# Patient Record
Sex: FEMALE | Race: WHITE | NOT HISPANIC OR LATINO | Employment: OTHER | URBAN - METROPOLITAN AREA
[De-identification: names, ages, dates, MRNs, and addresses within clinical notes are randomized per-mention and may not be internally consistent; named-entity substitution may affect disease eponyms.]

---

## 2017-02-02 ENCOUNTER — PATIENT MESSAGE (OUTPATIENT)
Dept: OBSTETRICS AND GYNECOLOGY | Facility: CLINIC | Age: 47
End: 2017-02-02

## 2017-02-02 DIAGNOSIS — Z01.419 ENCOUNTER FOR GYNECOLOGICAL EXAMINATION (GENERAL) (ROUTINE) WITHOUT ABNORMAL FINDINGS: ICD-10-CM

## 2017-02-03 RX ORDER — LEVONORGESTREL / ETHINYL ESTRADIOL AND ETHINYL ESTRADIOL 150-30(84)
1 KIT ORAL DAILY
Qty: 90 EACH | Refills: 3 | Status: SHIPPED | OUTPATIENT
Start: 2017-02-03 | End: 2017-09-01

## 2017-02-03 NOTE — TELEPHONE ENCOUNTER
Julia pt requesting a refill of her OCP. Saw Dr. Dumont for her annual last April. Pt says that she lost her insurance but that she is on disability and will be eligible for medicare in September. She said she an make an apt for her annual then. Pt states that she has pmdd and bipolar disorder and the OCP really help with both. Allergies and pharmacy up to date.

## 2017-09-01 ENCOUNTER — OFFICE VISIT (OUTPATIENT)
Dept: OBSTETRICS AND GYNECOLOGY | Facility: CLINIC | Age: 47
End: 2017-09-01
Payer: MEDICARE

## 2017-09-01 VITALS
WEIGHT: 185.19 LBS | DIASTOLIC BLOOD PRESSURE: 80 MMHG | HEIGHT: 60 IN | SYSTOLIC BLOOD PRESSURE: 118 MMHG | BODY MASS INDEX: 36.36 KG/M2

## 2017-09-01 DIAGNOSIS — Z01.419 ENCOUNTER FOR GYNECOLOGICAL EXAMINATION (GENERAL) (ROUTINE) WITHOUT ABNORMAL FINDINGS: Primary | ICD-10-CM

## 2017-09-01 PROCEDURE — 99213 OFFICE O/P EST LOW 20 MIN: CPT | Mod: PBBFAC,PN | Performed by: OBSTETRICS & GYNECOLOGY

## 2017-09-01 PROCEDURE — 99999 PR PBB SHADOW E&M-EST. PATIENT-LVL III: CPT | Mod: PBBFAC,,, | Performed by: OBSTETRICS & GYNECOLOGY

## 2017-09-01 PROCEDURE — G0101 CA SCREEN;PELVIC/BREAST EXAM: HCPCS | Mod: S$PBB,,, | Performed by: OBSTETRICS & GYNECOLOGY

## 2017-09-01 RX ORDER — ZOLPIDEM TARTRATE 10 MG/1
10 TABLET ORAL NIGHTLY PRN
COMMUNITY
End: 2019-09-17

## 2017-09-01 RX ORDER — MODAFINIL 200 MG/1
400 TABLET ORAL 2 TIMES DAILY
COMMUNITY
End: 2020-07-23

## 2017-09-01 RX ORDER — SERTRALINE HYDROCHLORIDE 50 MG/1
50 TABLET, FILM COATED ORAL
COMMUNITY
End: 2018-02-15

## 2017-09-01 RX ORDER — ARIPIPRAZOLE 15 MG/1
15 TABLET ORAL DAILY
COMMUNITY
End: 2018-08-14

## 2017-09-01 NOTE — PROGRESS NOTES
Subjective:       Patient ID: Rosa Lau is a 47 y.o. female.    Chief Complaint:  Well Woman (Annual Exam, last pap 2016 pap & hpv neg, last mammo  normal. C/o hair loss)      Patient Active Problem List   Diagnosis    Bipolar I disorder, most recent episode (or current) unspecified       History of Present Illness  47 y.o. yo  here for annual exam. Was taken off OCP by eye doctor for recent issues with her retina. Periods are now unpredictable and worse PMS. Says Bipolar has been bad recently and is now on disability because of it. Rec ask eye doctor if ok for Mirena. May help with PMS. DId well on OCP.    Patient had a normal pap smear and HPV in 2016 at last annual visit. I explained new guidelines. Will repeat pap and HPV every 3 years. Answered all questions. Patient agrees.     Past Medical History:   Diagnosis Date    Macular degeneration of both eyes     Mental disorder     Bipolar disorder       Past Surgical History:   Procedure Laterality Date    ankle plate surgery       OVARIAN CYST SURGERY         OB History    Para Term  AB Living   0 0 0 0 0 0   SAB TAB Ectopic Multiple Live Births   0 0 0 0               Patient's last menstrual period was 2017.   Date of Last Pap: No result found    Review of Systems  Review of Systems   Constitutional: Negative for fatigue and unexpected weight change.   Respiratory: Negative for shortness of breath.    Cardiovascular: Negative for chest pain.   Gastrointestinal: Negative for abdominal pain, constipation, diarrhea, nausea and vomiting.   Genitourinary: Negative for dysuria.   Musculoskeletal: Negative for back pain.   Skin: Negative for rash.   Neurological: Negative for headaches.   Hematological: Does not bruise/bleed easily.   Psychiatric/Behavioral: Negative for behavioral problems.        Objective:   Physical Exam:   Constitutional: She is oriented to person, place, and time. Vital signs are normal. She appears  well-developed and well-nourished. No distress.        Pulmonary/Chest: She exhibits no mass. Right breast exhibits no mass, no nipple discharge, no skin change, no tenderness, no bleeding and no swelling. Left breast exhibits no mass, no nipple discharge, no skin change, no tenderness, no bleeding and no swelling. Breasts are symmetrical.        Abdominal: Soft. Normal appearance and bowel sounds are normal. She exhibits no distension and no mass. There is no tenderness. There is no rebound.     Genitourinary: Vagina normal and uterus normal. There is no rash, tenderness, lesion or injury on the right labia. There is no rash, tenderness, lesion or injury on the left labia. Uterus is not deviated, not enlarged, not fixed, not tender, not hosting fibroids and not experiencing uterine prolapse. Cervix is normal. Right adnexum displays no mass, no tenderness and no fullness. Left adnexum displays no mass, no tenderness and no fullness. No erythema, tenderness, rectocele, cystocele or unspecified prolapse of vaginal walls in the vagina. No vaginal discharge found. Cervix exhibits no motion tenderness, no discharge and no friability.           Musculoskeletal: Normal range of motion and moves all extremeties.      Lymphadenopathy:     She has no axillary adenopathy.        Right: No supraclavicular adenopathy present.        Left: No supraclavicular adenopathy present.    Neurological: She is alert and oriented to person, place, and time.    Skin: Skin is warm and dry.    Psychiatric: She has a normal mood and affect. Her behavior is normal. Judgment normal.        Assessment/ Plan:     1. Encounter for gynecological examination (general) (routine) without abnormal findings         Follow-up with me in 1 year

## 2017-09-07 ENCOUNTER — PATIENT MESSAGE (OUTPATIENT)
Dept: OBSTETRICS AND GYNECOLOGY | Facility: CLINIC | Age: 47
End: 2017-09-07

## 2017-09-07 DIAGNOSIS — F32.81 PMDD (PREMENSTRUAL DYSPHORIC DISORDER): Primary | ICD-10-CM

## 2017-09-07 DIAGNOSIS — Z12.31 ENCOUNTER FOR SCREENING MAMMOGRAM FOR MALIGNANT NEOPLASM OF BREAST: Primary | ICD-10-CM

## 2017-09-07 RX ORDER — NORETHINDRONE ACETATE AND ETHINYL ESTRADIOL 1MG-20(21)
1 KIT ORAL DAILY
Qty: 112 TABLET | Refills: 3 | Status: SHIPPED | OUTPATIENT
Start: 2017-09-07 | End: 2017-09-25

## 2017-09-24 ENCOUNTER — PATIENT MESSAGE (OUTPATIENT)
Dept: OBSTETRICS AND GYNECOLOGY | Facility: CLINIC | Age: 47
End: 2017-09-24

## 2017-09-24 DIAGNOSIS — F32.81 PMDD (PREMENSTRUAL DYSPHORIC DISORDER): Primary | ICD-10-CM

## 2017-09-25 RX ORDER — LEVONORGESTREL AND ETHINYL ESTRADIOL 0.1-0.02MG
1 KIT ORAL DAILY
Qty: 112 TABLET | Refills: 3 | Status: SHIPPED | OUTPATIENT
Start: 2017-09-25 | End: 2018-09-13 | Stop reason: SDUPTHER

## 2017-09-25 NOTE — TELEPHONE ENCOUNTER
I called pt. Doing ok. On disability for bipolar. Home with her wife who is there to monitor her. Patient does not feel her life is in danger. She really wants to try another pill. I rec consider stopping OCP and not doing another pill. But she feels strongly that she wants to try. She did well on Seasonique continuously in the past but we were trying a lower dose because of eye issues. Seasonique was Levonorgestrel. Will try Alesse which it the same progestin. Explained to pt. rec call me or message on protal in 1 week to let me know how she is doing. If SI continue then d/c OCP. Pt agrees

## 2017-10-09 ENCOUNTER — PATIENT MESSAGE (OUTPATIENT)
Dept: OBSTETRICS AND GYNECOLOGY | Facility: CLINIC | Age: 47
End: 2017-10-09

## 2017-10-17 ENCOUNTER — TELEPHONE (OUTPATIENT)
Dept: NEUROLOGY | Facility: CLINIC | Age: 47
End: 2017-10-17

## 2018-02-15 ENCOUNTER — OFFICE VISIT (OUTPATIENT)
Dept: NEUROLOGY | Facility: CLINIC | Age: 48
End: 2018-02-15
Payer: MEDICARE

## 2018-02-15 VITALS
DIASTOLIC BLOOD PRESSURE: 80 MMHG | BODY MASS INDEX: 36.05 KG/M2 | SYSTOLIC BLOOD PRESSURE: 125 MMHG | WEIGHT: 183.63 LBS | HEART RATE: 80 BPM | HEIGHT: 60 IN

## 2018-02-15 DIAGNOSIS — E78.49 OTHER HYPERLIPIDEMIA: ICD-10-CM

## 2018-02-15 DIAGNOSIS — R41.3 MEMORY LOSS: Primary | ICD-10-CM

## 2018-02-15 DIAGNOSIS — F31.9 BIPOLAR AFFECTIVE DISORDER, REMISSION STATUS UNSPECIFIED: ICD-10-CM

## 2018-02-15 DIAGNOSIS — I10 ESSENTIAL HYPERTENSION: ICD-10-CM

## 2018-02-15 PROCEDURE — 99999 PR PBB SHADOW E&M-EST. PATIENT-LVL III: CPT | Mod: PBBFAC,,, | Performed by: PSYCHIATRY & NEUROLOGY

## 2018-02-15 PROCEDURE — 99204 OFFICE O/P NEW MOD 45 MIN: CPT | Mod: S$PBB,,, | Performed by: PSYCHIATRY & NEUROLOGY

## 2018-02-15 PROCEDURE — 99213 OFFICE O/P EST LOW 20 MIN: CPT | Mod: PBBFAC | Performed by: PSYCHIATRY & NEUROLOGY

## 2018-02-15 RX ORDER — BENZTROPINE MESYLATE 1 MG/1
3 TABLET ORAL DAILY
COMMUNITY
Start: 2018-02-01 | End: 2019-09-17

## 2018-02-15 RX ORDER — CLONAZEPAM 2 MG/1
2 TABLET ORAL DAILY
COMMUNITY
Start: 2018-02-02

## 2018-02-15 RX ORDER — HYDROCHLOROTHIAZIDE 12.5 MG/1
12.5 CAPSULE ORAL DAILY
COMMUNITY
Start: 2018-01-15 | End: 2019-09-17 | Stop reason: SDUPTHER

## 2018-02-15 NOTE — PATIENT INSTRUCTIONS
Discussed with patient and her partner.  Her intermittent memory difficulties could be multifactorial including the history of the bipolar disorder, or medication related.  She has a history of prior head trauma with loss of consciousness the details are not known.  We will get a CT scan of the brain, noncontrast and set her up for neuropsychological testing.  In addition we will get copies of recent labs done by her primary care physician, Dr. Kori Hollingsworth.  She will follow-up after the neuropsychological testing is completed.

## 2018-02-20 ENCOUNTER — HOSPITAL ENCOUNTER (OUTPATIENT)
Dept: RADIOLOGY | Facility: OTHER | Age: 48
Discharge: HOME | End: 2018-02-20
Attending: PSYCHIATRY & NEUROLOGY
Payer: MEDICARE

## 2018-02-20 DIAGNOSIS — E78.49 OTHER HYPERLIPIDEMIA: ICD-10-CM

## 2018-02-20 DIAGNOSIS — I10 ESSENTIAL HYPERTENSION: ICD-10-CM

## 2018-02-20 DIAGNOSIS — F31.9 BIPOLAR AFFECTIVE DISORDER, REMISSION STATUS UNSPECIFIED: ICD-10-CM

## 2018-02-20 DIAGNOSIS — R41.3 MEMORY LOSS: ICD-10-CM

## 2018-02-20 PROCEDURE — 70450 CT HEAD/BRAIN W/O DYE: CPT | Mod: TC

## 2018-02-20 PROCEDURE — 70450 CT HEAD/BRAIN W/O DYE: CPT | Mod: 26,,, | Performed by: RADIOLOGY

## 2018-04-30 ENCOUNTER — INITIAL CONSULT (OUTPATIENT)
Dept: NEUROLOGY | Facility: CLINIC | Age: 48
End: 2018-04-30
Payer: MEDICARE

## 2018-04-30 DIAGNOSIS — F31.9 BIPOLAR AFFECTIVE DISORDER, REMISSION STATUS UNSPECIFIED: ICD-10-CM

## 2018-04-30 DIAGNOSIS — I10 ESSENTIAL HYPERTENSION: ICD-10-CM

## 2018-04-30 DIAGNOSIS — R41.3 MEMORY LOSS: ICD-10-CM

## 2018-04-30 DIAGNOSIS — F31.5: ICD-10-CM

## 2018-04-30 DIAGNOSIS — R41.9 COGNITIVE COMPLAINTS: ICD-10-CM

## 2018-04-30 DIAGNOSIS — E78.49 OTHER HYPERLIPIDEMIA: ICD-10-CM

## 2018-04-30 PROCEDURE — 96119 PR NEUROPSYCH TESTING BY TECHNICIAN: CPT | Mod: 59,S$PBB,, | Performed by: PSYCHIATRY & NEUROLOGY

## 2018-04-30 PROCEDURE — 90791 PSYCH DIAGNOSTIC EVALUATION: CPT | Mod: S$PBB,,, | Performed by: PSYCHIATRY & NEUROLOGY

## 2018-04-30 PROCEDURE — 90791 PSYCH DIAGNOSTIC EVALUATION: CPT | Mod: PBBFAC | Performed by: PSYCHIATRY & NEUROLOGY

## 2018-04-30 PROCEDURE — 96118 PR NEUROPSYCH TESTING BY PSYCH/PHYS: CPT | Mod: S$PBB,,, | Performed by: PSYCHIATRY & NEUROLOGY

## 2018-04-30 PROCEDURE — 96119 PR NEUROPSYCH TESTING BY TECHNICIAN: CPT | Mod: PBBFAC | Performed by: PSYCHIATRY & NEUROLOGY

## 2018-04-30 PROCEDURE — 99499 UNLISTED E&M SERVICE: CPT | Mod: S$PBB,,, | Performed by: PSYCHIATRY & NEUROLOGY

## 2018-04-30 PROCEDURE — 96118 PR NEUROPSYCH TESTING BY PSYCH/PHYS: CPT | Mod: PBBFAC | Performed by: PSYCHIATRY & NEUROLOGY

## 2018-04-30 NOTE — Clinical Note
Hi,  Here are the results of Ms. Lau's evaluation. Please let me know if you have any questions.  [place], Aline

## 2018-04-30 NOTE — PROGRESS NOTES
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 less noise) to promote sleep readiness.   vi. Visit the www.sleepfoundation.org for more strategies.   f. Practice good cognitive hygiene:  i. Engage in regular exercise, which increases alertness and arousal and can improve attention and focus.  Consider lower impact exercises, such as yoga or light walking.  ii. Get a good nights sleep, as this can enhance alertness and cognition.  iii. Eat healthy foods and balanced meals. It is notable that research indicates certain nutrients may aid in brain function, such as B vitamins (especially B6, B12, and folic acid), antioxidants (such as vitamins C and E, and beta carotene), and Omega-3 fatty acids. Talk with your physician or nutritionist about whats right for you.   iv. Keep your brain active. Find activities to stay mentally active, such as reading, games (cards, checkers), puzzles (crosswords, Sudoku, jig saw), crafts (Ravel Law, woodworking), gardening, or participating in activities in the community.  v. Stay socially engaged. Continue staying active with your family and friends.  g. Monitor your mood:  You reported symptoms of anxiety and depression in the context of longstanding bipolar disorder, and you described some improvement in symptoms over  the past year. I recommend continuing psychiatric and psychological treatment to help you more effectively manage symptoms.    Thank you for allowing me to participate in Ms. Lau's care.  If you have any questions, please contact me at [phone].    Aline Roper, Ph.D., ABPP  Board Certified in Clinical Neuropsychology  [facility] - Department of Neurology    APPENDIX    Neuropsychological ASSESSMENT Results    The following should not be interpreted in isolation from the neuropsychological evaluation report.  Scores on stand-alone and embedded performance validity measures were variable.    Percentile Interpretation:       </=2nd......................................Impaired       3rd-8th.....................................Borderline       9th-24th...................................Low Average       25th-75th...................................Average       76th-91st...................................High Average       92nd-97th...................................Superior       >97th.....................................Very Superior     Test, Subtests, Indices, Composites Raw Score Standardized Score (Standard Score, Index, Scaled Score, Z-score, T-score) Percentile/Cumulative Percentage   Premorbid Estimate   TOP 52 109 Predicted FSIQ = 108   Intellectual Functioning   WAIS-IV      Similarities 30 12 75   Block Design 24 6 9   Matrix Reasoning 16 10 50   Digit Span 21 7 16   Symbol Search 22 7 16   Coding 52 8 25   Attention/Processing Speed/Executive Functioning   WAIS-IV Digit Span      Forward Raw 7 6 9   Forward Span 5 - -   Backward Raw 8 9 37   Backward Span 4 - -   Sequencing Raw 6 7 16   Sequencing Span 4 - -         TMT      Trails A 24, 0 errors 52 55   Trails B 58, 0 errors 48 45         COWAT      FAS 38 45 32   F 13 - -   A 10 - -   S 15 - -   Animals 17 39 14         WCST-64      Categories Completed 5 - >16   Trials to Complete 1st 11 - >16   Fail to Maintain Set 0 - -   Learning to  Learn 0 - >16   Total Errors 9 51 55   Perseverative Errors 5 47 39   Language Functioning   NAB      Naming 31/31 54 66   Motor Functioning   Grooved Pegboard      Dominant Hand (Right) 86, 0 drops 30 2   Non-dominant Hand 93, 0 drops 32 4   Memory Functioning   WMS-IV, Adult      Logical Memory I 18 7 16   LM II 16 8 25   LM Recog 20 - 3-9   Vis Reprod I 32 8 25   VR II 27 10 50   VR Recog 4 - 17-25   VR Copy 42 - 26-50         HVLT-R, Form 1      Total Recall 25 42 21   Trial 1 6 - -   Trial 2 8 - -   Trial 3 11 - -   Delayed Recall 8 39 14   Retention (%) 73 37 9   Recog Discrim Index 11 49 47   Hits 11/12 - -   False Positives 0/12 - -   Emotional Functioning   LATASHA 19/63 - Moderate         BDI-II 21/63 - Moderate

## 2018-05-09 ENCOUNTER — DOCUMENTATION ONLY (OUTPATIENT)
Dept: NEUROLOGY | Facility: CLINIC | Age: 48
End: 2018-05-09

## 2018-05-09 NOTE — PROGRESS NOTES
NEUROPSYCHOLOGICAL EVALUATION FEEDBACK    Rosa Lau attended a feedback session today by telephone, accompanied by her spouse.  We discussed the results of the neuropsychological evaluation (39 minutes).  I provided Ms. Lau with a copy of the evaluation report via mail and gave time to discuss questions and concerns.    Aline Roper, Ph.D., ABPP  Board Certified in Clinical Neuropsychology  [facility] - Department of Neurology

## 2018-06-12 ENCOUNTER — HOSPITAL ENCOUNTER (OUTPATIENT)
Dept: RADIOLOGY | Facility: OTHER | Age: 48
Discharge: HOME | End: 2018-06-12
Attending: OBSTETRICS & GYNECOLOGY
Payer: MEDICARE

## 2018-06-12 ENCOUNTER — OFFICE VISIT (OUTPATIENT)
Dept: NEUROLOGY | Facility: CLINIC | Age: 48
End: 2018-06-12
Payer: MEDICARE

## 2018-06-12 VITALS
SYSTOLIC BLOOD PRESSURE: 109 MMHG | DIASTOLIC BLOOD PRESSURE: 71 MMHG | WEIGHT: 180.31 LBS | BODY MASS INDEX: 35.4 KG/M2 | HEART RATE: 91 BPM | HEIGHT: 60 IN

## 2018-06-12 DIAGNOSIS — F31.9 COGNITIVE DYSFUNCTION IN BIPOLAR DISORDER: Primary | ICD-10-CM

## 2018-06-12 DIAGNOSIS — F06.8 COGNITIVE DYSFUNCTION IN BIPOLAR DISORDER: Primary | ICD-10-CM

## 2018-06-12 DIAGNOSIS — F41.8 MIXED ANXIETY AND DEPRESSIVE DISORDER: ICD-10-CM

## 2018-06-12 DIAGNOSIS — F31.9 BIPOLAR AFFECTIVE DISORDER, REMISSION STATUS UNSPECIFIED: ICD-10-CM

## 2018-06-12 DIAGNOSIS — E03.9 HYPOTHYROIDISM, UNSPECIFIED TYPE: ICD-10-CM

## 2018-06-12 DIAGNOSIS — I10 ESSENTIAL HYPERTENSION: ICD-10-CM

## 2018-06-12 DIAGNOSIS — E78.49 OTHER HYPERLIPIDEMIA: ICD-10-CM

## 2018-06-12 DIAGNOSIS — Z12.31 ENCOUNTER FOR SCREENING MAMMOGRAM FOR MALIGNANT NEOPLASM OF BREAST: ICD-10-CM

## 2018-06-12 PROBLEM — J45.909 EXTRINSIC ASTHMA: Status: ACTIVE | Noted: 2018-06-12

## 2018-06-12 PROBLEM — M72.2 PLANTAR FASCIITIS: Status: ACTIVE | Noted: 2017-12-18

## 2018-06-12 PROBLEM — E78.1 PURE HYPERTRIGLYCERIDEMIA: Status: ACTIVE | Noted: 2018-06-12

## 2018-06-12 PROBLEM — J45.30 MILD PERSISTENT ASTHMA, UNCOMPLICATED: Status: ACTIVE | Noted: 2018-06-12

## 2018-06-12 PROBLEM — K21.9 GASTRO-ESOPHAGEAL REFLUX DISEASE WITHOUT ESOPHAGITIS: Status: ACTIVE | Noted: 2018-06-12

## 2018-06-12 PROBLEM — J30.1 ALLERGIC RHINITIS DUE TO POLLEN: Status: ACTIVE | Noted: 2018-06-12

## 2018-06-12 PROBLEM — J45.909 ASTHMA: Status: ACTIVE | Noted: 2018-06-12

## 2018-06-12 PROBLEM — H35.30 MYOPIC MACULAR DEGENERATION: Status: ACTIVE | Noted: 2017-06-19

## 2018-06-12 PROBLEM — K58.9 IRRITABLE COLON: Status: ACTIVE | Noted: 2018-06-12

## 2018-06-12 PROCEDURE — 77067 SCR MAMMO BI INCL CAD: CPT | Mod: 26,,, | Performed by: RADIOLOGY

## 2018-06-12 PROCEDURE — 99999 PR PBB SHADOW E&M-EST. PATIENT-LVL III: CPT | Mod: PBBFAC,,, | Performed by: PSYCHIATRY & NEUROLOGY

## 2018-06-12 PROCEDURE — 77063 BREAST TOMOSYNTHESIS BI: CPT | Mod: 26,,, | Performed by: RADIOLOGY

## 2018-06-12 PROCEDURE — 99214 OFFICE O/P EST MOD 30 MIN: CPT | Mod: S$PBB,,, | Performed by: PSYCHIATRY & NEUROLOGY

## 2018-06-12 PROCEDURE — 77067 SCR MAMMO BI INCL CAD: CPT | Mod: TC

## 2018-06-12 PROCEDURE — 99213 OFFICE O/P EST LOW 20 MIN: CPT | Mod: PBBFAC | Performed by: PSYCHIATRY & NEUROLOGY

## 2018-06-12 RX ORDER — ALBUTEROL SULFATE 90 UG/1
AEROSOL, METERED RESPIRATORY (INHALATION)
COMMUNITY
Start: 2018-04-18 | End: 2020-07-23 | Stop reason: SDUPTHER

## 2018-06-12 RX ORDER — BUDESONIDE AND FORMOTEROL FUMARATE DIHYDRATE 80; 4.5 UG/1; UG/1
AEROSOL RESPIRATORY (INHALATION)
COMMUNITY
End: 2022-01-14

## 2018-06-12 RX ORDER — BUDESONIDE 180 UG/1
AEROSOL, POWDER RESPIRATORY (INHALATION)
COMMUNITY
Start: 2018-05-23 | End: 2019-09-17
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
## 2018-06-12 NOTE — PATIENT INSTRUCTIONS
Discussed with patient and her partner.  Her intermittent memory difficulties  Is most likely related to the history of the bipolar disorder, or medication related.   Results of the neuropsychological testing discussed with the patient.  She is presently being followed by Psychiatry who is in the process of weaning her off some of medications to see if this might help her cognitive issues.  Control vascular risk factors specially hypertension and hyperlipidemia is important.  She is advised to continue follow-up with Psychiatry and will be seen by me on an as needed basis.

## 2018-08-14 ENCOUNTER — OFFICE VISIT (OUTPATIENT)
Dept: DERMATOLOGY | Facility: CLINIC | Age: 48
End: 2018-08-14
Payer: MEDICARE

## 2018-08-14 DIAGNOSIS — B35.1 ONYCHOMYCOSIS: Primary | ICD-10-CM

## 2018-08-14 PROCEDURE — 99212 OFFICE O/P EST SF 10 MIN: CPT | Mod: PBBFAC | Performed by: NURSE PRACTITIONER

## 2018-08-14 PROCEDURE — 87107 FUNGI IDENTIFICATION MOLD: CPT

## 2018-08-14 PROCEDURE — 99202 OFFICE O/P NEW SF 15 MIN: CPT | Mod: S$PBB,,, | Performed by: NURSE PRACTITIONER

## 2018-08-14 PROCEDURE — 99999 PR PBB SHADOW E&M-EST. PATIENT-LVL II: CPT | Mod: PBBFAC,,, | Performed by: NURSE PRACTITIONER

## 2018-08-14 PROCEDURE — 87101 SKIN FUNGI CULTURE: CPT

## 2018-08-14 RX ORDER — CICLOPIROX 80 MG/ML
SOLUTION TOPICAL
Qty: 1 BOTTLE | Refills: 5 | Status: SHIPPED | OUTPATIENT
Start: 2018-08-14 | End: 2019-09-17

## 2018-08-14 NOTE — PATIENT INSTRUCTIONS
Lamisil spray to shoes daily.  Zeasorb AF powder to socks daily    Recommend trimming nails.Keep your feet clean and dry. Avoid sharing nail tools, such as clippers and scissors. Wear flip-flops or other footwear in a gym shower or locker room

## 2018-08-14 NOTE — PROGRESS NOTES
Subjective:       Patient ID:  Rosa Lau is a 48 y.o. female who presents for   Chief Complaint   Patient presents with    Nail Problem     Nail Problem  - Initial  Affected locations: right foot  Duration: 1 year  Signs / symptoms: scaling and growing  Severity: mild  Timing: constant  Aggravated by: nothing  Relieving factors/Treatments tried: nothing  Improvement on treatment: no relief        Review of Systems   Skin: Positive for activity-related sunscreen use. Negative for daily sunscreen use.   Hematologic/Lymphatic: Does not bruise/bleed easily.        Objective:    Physical Exam   Constitutional: She appears well-developed and well-nourished. No distress.   Neurological: She is alert and oriented to person, place, and time. She is not disoriented.   Psychiatric: She has a normal mood and affect.   Skin:   Areas Examined (abnormalities noted in diagram):   Nails and Digits Inspection Performed                 Diagram Legend     Erythematous scaling macule/papule c/w actinic keratosis       Vascular papule c/w angioma      Pigmented verrucoid papule/plaque c/w seborrheic keratosis      Yellow umbilicated papule c/w sebaceous hyperplasia      Irregularly shaped tan macule c/w lentigo     1-2 mm smooth white papules consistent with Milia      Movable subcutaneous cyst with punctum c/w epidermal inclusion cyst      Subcutaneous movable cyst c/w pilar cyst      Firm pink to brown papule c/w dermatofibroma      Pedunculated fleshy papule(s) c/w skin tag(s)      Evenly pigmented macule c/w junctional nevus     Mildly variegated pigmented, slightly irregular-bordered macule c/w mildly atypical nevus      Flesh colored to evenly pigmented papule c/w intradermal nevus       Pink pearly papule/plaque c/w basal cell carcinoma      Erythematous hyperkeratotic cursted plaque c/w SCC      Surgical scar with no sign of skin cancer recurrence      Open and closed comedones      Inflammatory papules and pustules       Verrucoid papule consistent consistent with wart     Erythematous eczematous patches and plaques     Dystrophic onycholytic nail with subungual debris c/w onychomycosis     Umbilicated papule    Erythematous-base heme-crusted tan verrucoid plaque consistent with inflamed seborrheic keratosis     Erythematous Silvery Scaling Plaque c/w Psoriasis     See annotation      Assessment / Plan:        Onychomycosis  -     Fungal culture , skin, hair, or nails  -     ciclopirox (PENLAC) 8 % Soln; Apply daily to affected nail. Must remove and restart weekly  Dispense: 1 Bottle; Refill: 5    Lamisil spray to shoes daily.  Zeasorb AF powder to socks daily    Recommend trimming nails.Keep your feet clean and dry. Avoid sharing nail tools, such as clippers and scissors. Wear flip-flops or other footwear in a gym shower or locker room             Follow-up if symptoms worsen or fail to improve.

## 2018-08-21 ENCOUNTER — PATIENT MESSAGE (OUTPATIENT)
Dept: DERMATOLOGY | Facility: CLINIC | Age: 48
End: 2018-08-21

## 2018-09-13 ENCOUNTER — OFFICE VISIT (OUTPATIENT)
Dept: OBSTETRICS AND GYNECOLOGY | Facility: CLINIC | Age: 48
End: 2018-09-13
Attending: OBSTETRICS & GYNECOLOGY
Payer: MEDICARE

## 2018-09-13 VITALS
DIASTOLIC BLOOD PRESSURE: 86 MMHG | BODY MASS INDEX: 35.6 KG/M2 | HEIGHT: 60 IN | WEIGHT: 181.31 LBS | SYSTOLIC BLOOD PRESSURE: 126 MMHG

## 2018-09-13 DIAGNOSIS — F32.81 PMDD (PREMENSTRUAL DYSPHORIC DISORDER): ICD-10-CM

## 2018-09-13 PROCEDURE — G0101 CA SCREEN;PELVIC/BREAST EXAM: HCPCS | Mod: S$PBB,,, | Performed by: OBSTETRICS & GYNECOLOGY

## 2018-09-13 PROCEDURE — 99213 OFFICE O/P EST LOW 20 MIN: CPT | Mod: PBBFAC | Performed by: OBSTETRICS & GYNECOLOGY

## 2018-09-13 PROCEDURE — 99999 PR PBB SHADOW E&M-EST. PATIENT-LVL III: CPT | Mod: PBBFAC,,, | Performed by: OBSTETRICS & GYNECOLOGY

## 2018-09-13 RX ORDER — LEVONORGESTREL AND ETHINYL ESTRADIOL 0.1-0.02MG
1 KIT ORAL DAILY
Qty: 112 TABLET | Refills: 3 | Status: SHIPPED | OUTPATIENT
Start: 2018-09-13 | End: 2019-09-17

## 2018-09-13 RX ORDER — AMOXICILLIN 875 MG/1
TABLET, FILM COATED ORAL
COMMUNITY
Start: 2018-06-29 | End: 2019-09-17

## 2018-09-13 NOTE — PROGRESS NOTES
Subjective:       Patient ID: Rosa Lau is a 48 y.o. female.    Chief Complaint:  Annual Exam      Patient Active Problem List   Diagnosis    Bipolar affective disorder    Memory loss    Other hyperlipidemia    Essential hypertension    Asthma    Extrinsic asthma    Body mass index (bmi) 33.0-33.9, adult    Neck sprain and strain    Pure hypertriglyceridemia    Gastro-esophageal reflux disease without esophagitis    History of colonic polyps    Hypothyroidism    Irritable colon    Mild persistent asthma, uncomplicated    Mixed anxiety and depressive disorder    Myopic macular degeneration    Plantar fasciitis    Allergic rhinitis due to pollen    Cognitive dysfunction in bipolar disorder       History of Present Illness  48 y.o. yo  here for annual exam. No gyn complaints. Doing well. On lower dose pill because of eye retina issues. Says it is helping wit depression. She tried getting off OCP because of eye issues and had sever bout of depression and suicidal thoughts. Once she restarted those thoughts improved. Has occasional BTB now that she is on lower dose. Overall doing well. Needs colonoscopy. Mom has h/o cancerous polyps. Referral names given.     Patient had a normal pap smear and HPV in 2016 at last annual visit. I explained new guidelines. Will repeat pap and HPV every 3 years. Answered all questions. Patient agrees.         Past Medical History:   Diagnosis Date    Macular degeneration of both eyes     Mental disorder     Bipolar disorder       Past Surgical History:   Procedure Laterality Date    ankle plate surgery       OVARIAN CYST SURGERY         OB History    Para Term  AB Living   0 0 0 0 0 0   SAB TAB Ectopic Multiple Live Births   0 0 0 0               Patient's last menstrual period was 2018 (exact date).   Date of Last Pap: No result found    Review of Systems  Review of Systems   Constitutional: Negative for fatigue and unexpected  weight change.   Respiratory: Negative for shortness of breath.    Cardiovascular: Negative for chest pain.   Gastrointestinal: Negative for abdominal pain, constipation, diarrhea, nausea and vomiting.   Genitourinary: Negative for dysuria.   Musculoskeletal: Negative for back pain.   Skin: Negative for rash.   Neurological: Negative for headaches.   Hematological: Does not bruise/bleed easily.   Psychiatric/Behavioral: Negative for behavioral problems.        Objective:   Physical Exam:   Constitutional: She is oriented to person, place, and time. Vital signs are normal. She appears well-developed and well-nourished. No distress.        Pulmonary/Chest: She exhibits no mass. Right breast exhibits no mass, no nipple discharge, no skin change, no tenderness, no bleeding and no swelling. Left breast exhibits no mass, no nipple discharge, no skin change, no tenderness, no bleeding and no swelling. Breasts are symmetrical.        Abdominal: Soft. Normal appearance and bowel sounds are normal. She exhibits no distension and no mass. There is no tenderness. There is no rebound.     Genitourinary: Vagina normal and uterus normal. There is no rash, tenderness, lesion or injury on the right labia. There is no rash, tenderness, lesion or injury on the left labia. Uterus is not deviated, not enlarged, not fixed, not tender, not hosting fibroids and not experiencing uterine prolapse. Cervix is normal. Right adnexum displays no mass, no tenderness and no fullness. Left adnexum displays no mass, no tenderness and no fullness. No erythema, tenderness, rectocele, cystocele or unspecified prolapse of vaginal walls in the vagina. No vaginal discharge found. Cervix exhibits no motion tenderness, no discharge and no friability.           Musculoskeletal: Normal range of motion and moves all extremeties.      Lymphadenopathy:     She has no axillary adenopathy.        Right: No supraclavicular adenopathy present.        Left: No  supraclavicular adenopathy present.    Neurological: She is alert and oriented to person, place, and time.    Skin: Skin is warm and dry.    Psychiatric: She has a normal mood and affect. Her behavior is normal. Judgment normal.        Assessment/ Plan:     1. PMDD (premenstrual dysphoric disorder)  levonorgestrel-ethinyl estradiol (AVIANE,ALESSE,LESSINA) 0.1-20 mg-mcg per tablet       Follow-up with me in 1 year

## 2018-09-18 LAB — FUNGUS BLD CULT: NORMAL

## 2019-03-26 ENCOUNTER — TELEPHONE (OUTPATIENT)
Dept: OBSTETRICS AND GYNECOLOGY | Facility: CLINIC | Age: 49
End: 2019-03-26

## 2019-03-26 DIAGNOSIS — R10.2 PELVIC PAIN IN FEMALE: Primary | ICD-10-CM

## 2019-03-26 NOTE — TELEPHONE ENCOUNTER
Pt reports sharp pain in LLQ.  She has a h/o ruptured ovarian cysts and the pain is similar.  She has not been on OCP for 5 months.  Scheduled US and appt with Dr. Dumont tomorrow.  Aware of US prep, time and location.

## 2019-03-27 ENCOUNTER — TELEPHONE (OUTPATIENT)
Dept: OBSTETRICS AND GYNECOLOGY | Facility: CLINIC | Age: 49
End: 2019-03-27

## 2019-03-27 NOTE — TELEPHONE ENCOUNTER
Dr Dumont pt was seen this morning for a gyn u/s and still wants dr dumont to call her to discuss results.Pt # [phone]

## 2019-03-27 NOTE — TELEPHONE ENCOUNTER
I called pt. She had appt with u/s today for LLQ pain. U/s normal except for follicles, small cyst. I had to leave for emergency so I was not able to see her. Sounds like pain maybe diverticulitis. She says was pretty bad and is still hurting. I rec call PCP or go to ER if pain is severe. Pt agrees

## 2019-07-05 ENCOUNTER — OFFICE VISIT (OUTPATIENT)
Dept: URGENT CARE | Facility: CLINIC | Age: 49
End: 2019-07-05
Payer: MEDICARE

## 2019-07-05 VITALS
DIASTOLIC BLOOD PRESSURE: 66 MMHG | OXYGEN SATURATION: 95 % | SYSTOLIC BLOOD PRESSURE: 125 MMHG | HEART RATE: 80 BPM | HEIGHT: 59 IN | TEMPERATURE: 98 F | RESPIRATION RATE: 18 BRPM | BODY MASS INDEX: 38.51 KG/M2 | WEIGHT: 191 LBS

## 2019-07-05 DIAGNOSIS — H10.32 ACUTE CONJUNCTIVITIS OF LEFT EYE, UNSPECIFIED ACUTE CONJUNCTIVITIS TYPE: Primary | ICD-10-CM

## 2019-07-05 PROCEDURE — 99214 OFFICE O/P EST MOD 30 MIN: CPT | Mod: S$GLB,ICN,, | Performed by: NURSE PRACTITIONER

## 2019-07-05 PROCEDURE — 99214 PR OFFICE/OUTPT VISIT, EST, LEVL IV, 30-39 MIN: ICD-10-PCS | Mod: S$GLB,ICN,, | Performed by: NURSE PRACTITIONER

## 2019-07-05 RX ORDER — MOXIFLOXACIN 5 MG/ML
1 SOLUTION OPHTHALMIC 2 TIMES DAILY
Qty: 3 ML | Refills: 0 | Status: SHIPPED | OUTPATIENT
Start: 2019-07-05 | End: 2019-07-12

## 2019-07-05 RX ORDER — AZITHROMYCIN 250 MG/1
500 TABLET, FILM COATED ORAL DAILY
COMMUNITY
End: 2019-09-17

## 2019-07-05 NOTE — PATIENT INSTRUCTIONS
Please return here or go to the Emergency Department for any concerns or worsening of condition.  If you were prescribed antibiotics, please take them to completion.  If you were prescribed a narcotic medication, do not drive or operate heavy equipment or machinery while taking these medications.  Please follow up with your primary care doctor or specialist as needed.    If you  smoke, please stop smoking.    Conjunctivitis, Nonspecific    The membrane that covers the white part of your eye (the conjunctiva) is inflamed. Inflammation happens when your body responds to an injury, allergic reaction, infection, or illness. Symptoms of inflammation in the eye may include redness, irritation, itching, swelling, or burning. These symptoms should go away within the next 24 hours. Conjunctivitis may be related to a particle that was in your eye. If so, it may wash out with your tears or irrigation treatment. Being exposed to liquid chemicals or fumes may also cause this reaction.   Home care  · Apply a cold pack (ice in a plastic bag, wrapped in a towel) over the eye for 20 minutes at a time. This will reduce pain.  · Artificial tears may be prescribed to reduce irritation or redness.  These should be used 3 to 4 times a day.  · You may use acetaminophen or ibuprofen to control pain, unless another medicine was prescribed.(Note: If you have chronic liver or kidney disease, or if you have ever had a stomach ulcer or gastrointestinal bleeding, talk with your healthcare provider before using these medicines.)  Follow-up care  Follow up with your healthcare provider, or as advised.  When to seek medical advice  Call your healthcare provider right away if any of these occur:  · Increased eyelid swelling  · Increased eye pain  · Increased redness or drainage from the eye  · Increased blurry vision or increased sensitivity to light  · Failure of normal vision to return within 24 to 48 hours  Date Last Reviewed: 6/14/2015  ©  6092-6208 [employer]. [address] [place] [address], [place] [address]. All rights reserved. This information is not intended as a substitute for professional medical care. Always follow your healthcare professional's instructions.

## 2019-07-05 NOTE — PROGRESS NOTES
"Subjective:       Patient ID: Rosa Lau is a 49 y.o. female.    Vitals:    07/05/19 0818   BP: 125/66   Pulse: 80   Resp: 18   Temp: 97.5 °F (36.4 °C)   SpO2: 95%   Weight: 86.6 kg (191 lb)   Height: 4' 11" (1.499 m)       Chief Complaint: Conjunctivitis    Patient reports experiencing redness and discharge of the left eye for the past 3 days. Patient reports burning and blurriness of the eye. Has not used anything for relief.    Conjunctivitis   This is a new problem. The current episode started in the past 7 days. The problem occurs constantly. The problem has been unchanged. Nothing aggravates the symptoms. She has tried nothing for the symptoms. The treatment provided no relief.     Review of Systems   Eyes: Positive for blurred vision, discharge and redness. Negative for pain.   All other systems reviewed and are negative.      Objective:      Physical Exam   Constitutional: She is oriented to person, place, and time. Vital signs are normal. She appears well-developed and well-nourished.  Non-toxic appearance. She does not have a sickly appearance. She appears ill. No distress.   HENT:   Head: Normocephalic and atraumatic.   Right Ear: External ear normal.   Left Ear: External ear normal.   Nose: Nose normal.   Mouth/Throat: Uvula is midline and oropharynx is clear and moist.   Eyes: Pupils are equal, round, and reactive to light. EOM and lids are normal. Left eye exhibits discharge. Right conjunctiva is not injected. Left conjunctiva is injected.   Neck: Trachea normal, normal range of motion, full passive range of motion without pain and phonation normal. Neck supple.   Cardiovascular: Normal rate.   Pulmonary/Chest: Effort normal.   Abdominal: Soft. Bowel sounds are normal. There is no tenderness.   Musculoskeletal: Normal range of motion.   Lymphadenopathy:     She has no cervical adenopathy.   Neurological: She is alert and oriented to person, place, and time.   Skin: Skin is warm, dry and intact. " Capillary refill takes less than 2 seconds.   Psychiatric: She has a normal mood and affect. Her speech is normal and behavior is normal. Judgment and thought content normal. Cognition and memory are normal.   Nursing note and vitals reviewed.      Assessment:       1. Acute conjunctivitis of left eye, unspecified acute conjunctivitis type        Plan:       Rosa was seen today for conjunctivitis.    Diagnoses and all orders for this visit:    Acute conjunctivitis of left eye, unspecified acute conjunctivitis type  -     moxifloxacin 0.5 % DrpV; Apply 1 drop to eye 2 (two) times daily. for 7 days          Patient Instructions     Please return here or go to the Emergency Department for any concerns or worsening of condition.  If you were prescribed antibiotics, please take them to completion.  If you were prescribed a narcotic medication, do not drive or operate heavy equipment or machinery while taking these medications.  Please follow up with your primary care doctor or specialist as needed.    If you  smoke, please stop smoking.    Conjunctivitis, Nonspecific    The membrane that covers the white part of your eye (the conjunctiva) is inflamed. Inflammation happens when your body responds to an injury, allergic reaction, infection, or illness. Symptoms of inflammation in the eye may include redness, irritation, itching, swelling, or burning. These symptoms should go away within the next 24 hours. Conjunctivitis may be related to a particle that was in your eye. If so, it may wash out with your tears or irrigation treatment. Being exposed to liquid chemicals or fumes may also cause this reaction.   Home care  · Apply a cold pack (ice in a plastic bag, wrapped in a towel) over the eye for 20 minutes at a time. This will reduce pain.  · Artificial tears may be prescribed to reduce irritation or redness.  These should be used 3 to 4 times a day.  · You may use acetaminophen or ibuprofen to control pain, unless  another medicine was prescribed.(Note: If you have chronic liver or kidney disease, or if you have ever had a stomach ulcer or gastrointestinal bleeding, talk with your healthcare provider before using these medicines.)  Follow-up care  Follow up with your healthcare provider, or as advised.  When to seek medical advice  Call your healthcare provider right away if any of these occur:  · Increased eyelid swelling  · Increased eye pain  · Increased redness or drainage from the eye  · Increased blurry vision or increased sensitivity to light  · Failure of normal vision to return within 24 to 48 hours  Date Last Reviewed: 6/14/2015  © 3863-1273 [employer]. [address] [place] [address], [place] [address]. All rights reserved. This information is not intended as a substitute for professional medical care. Always follow your healthcare professional's instructions.

## 2019-09-16 NOTE — PROGRESS NOTES
"Subjective:       Patient ID: Rosa Lau is a 49 y.o. female.    Chief Complaint: Establish Care    HPI  This patient is new to me.   Rosa Lau is a 49 y.o. year old female with bipolar disorder, PMDD, LIA, GERD, asthma, HTN, HLD, MCI, eye disorder who presents today to establish care.     Complains of chest tightness that occurs occasionally. Does not occur with exertion, but she has been sedentary. Episodes last for 0.5-1hr. Also has some pain associated. Located in center of chest. No associated symptoms. Does take Rolaids daily for heartburn. Has tried PPI in the past.     Complains of neck and low back "cracking". Began around 1 year ago. Began out of the blue. No pain, but a cracking sensation with certain movements of her neck.     Eye disorder - has been diagnosed with Histomplasmosis capsulatum retinitis, Chorioretinitis  Retinoschisis, Myopic degeneration - follows with optho. Takes eye supplements (AREDS, fish oil, B complex, CoQ 10, carnitine). Goes to [facility]. Vision progressively worsening. Told about the diagnosis 15 years ago.     Bipolar 1 disorder, LIA, PMDD - takes Vraylar 4.5mg daily, Prozac 10 mg daily (for PMDD) and propranolol 10 mg BID. Takes clonazepam 2 mg and hydroxyzine 100 mg at night to help with sleep. Sees psychiatry - Dr. Selena Bhat and therpaist. Diagnosed at age 20.  Diagnosed with Mild Cognitive Impairment through neuropsych testing.   Takes modafinil to try to help with memory.     Asthma - takes Symbicort daily. Uses albuterol PRN (1x/week)    Hypertriglyceridemia - takes fenofibrate daily.   Labs 19  HbA1c 5.2%  Tchol 208  HDL 50      ALT 36 (H)  AST 21  Normal renal function   TSH 3.93  Vitamin D 31    HTN - compliant with HCTZ 12.5 mg daily     Exercise- plans to start using home stationary bike  Diet - vegetarian    OB/GYN History     LMP:   Sexually active:  Contraception:     Health Maintenance  Pap smear: 18 - normal " "  Mammogram: 6/12/18 - normal   Colon Cancer Screening: Hx of colon polyps? Discuss with patient at next visit   DEXA: n/a  Hepatitis C screening: n/a  Flu vaccine:   Tetanus vaccine: UTD  PNA vaccine: n/a  Shingles vaccine: n/a    Health Maintenance       Date Due Completion Date    Lipid Panel 1972 ---    Pap Smear with HPV Cotest 04/08/2019 4/8/2016    Influenza Vaccine (1) 09/01/2019 11/16/2018    Mammogram 06/12/2020 6/12/2018    TETANUS VACCINE 06/12/2028 6/12/2018        I personally reviewed Past Medical History, Past Surgical History, Social History, and Family History    Review of Systems   Constitutional: Negative for chills, fatigue, fever and unexpected weight change.   HENT: Negative for congestion, hearing loss, rhinorrhea and sore throat.    Eyes: Negative for visual disturbance.   Respiratory: Negative for cough, shortness of breath and wheezing.    Cardiovascular: Negative for palpitations and leg swelling.        +occasional chest pain and tightness   Gastrointestinal: Negative for abdominal pain, constipation, diarrhea, nausea and vomiting.   Genitourinary: Negative for dysuria, frequency, menstrual problem and urgency.   Musculoskeletal: Negative for arthralgias and myalgias.   Skin: Negative for rash.   Neurological: Negative for dizziness, syncope and headaches.   Psychiatric/Behavioral: Negative for dysphoric mood and sleep disturbance. The patient is not nervous/anxious.        Objective:      Vitals:    09/17/19 1016   BP: 106/84   Pulse: 101   SpO2: 99%   Weight: 90.3 kg (199 lb 1.2 oz)   Height: 4' 11" (1.499 m)     Physical Exam   Constitutional: She is oriented to person, place, and time. She appears well-developed. No distress.   HENT:   Head: Normocephalic and atraumatic.   Right Ear: Hearing normal.   Left Ear: Hearing normal.   Nose: Nose normal.   Mouth/Throat: Oropharynx is clear and moist and mucous membranes are normal. No oropharyngeal exudate.   Eyes: Pupils are equal, " round, and reactive to light. Conjunctivae and lids are normal.   Neck: Normal range of motion. No thyroid mass and no thyromegaly present.   Cardiovascular: Normal rate, regular rhythm, S1 normal, S2 normal and intact distal pulses.   No murmur heard.  No lower extremity edema.    Pulmonary/Chest: Effort normal and breath sounds normal. No respiratory distress. She exhibits no tenderness.   Abdominal: Soft. Normal appearance and bowel sounds are normal. There is no tenderness.   Lymphadenopathy:     She has no cervical adenopathy.        Right: No supraclavicular adenopathy present.        Left: No supraclavicular adenopathy present.   Neurological: She is alert and oriented to person, place, and time.   Skin: Skin is warm and dry. No rash noted.   Psychiatric: She has a normal mood and affect. Her behavior is normal. Thought content normal.   Nursing note and vitals reviewed.      Assessment:       1. Essential hypertension    2. Hyperlipidemia, unspecified hyperlipidemia type    3. Chest discomfort    4. Gastroesophageal reflux disease, esophagitis presence not specified    5. Sleep disturbance    6. Morbid obesity with BMI of 40.0-44.9, adult    7. Mild persistent asthma, uncomplicated    8. Bipolar 1 disorder    9. Cognitive dysfunction in bipolar disorder    10. LIA (generalized anxiety disorder)    11. PMDD (premenstrual dysphoric disorder)    12. Retinal disease, bilateral        Plan:   Rosa was seen today for establish care.    Diagnoses and all orders for this visit:    Essential hypertension  - Controlled. Continue current medication regimen.   -     hydroCHLOROthiazide (MICROZIDE) 12.5 mg capsule; Take 1 capsule (12.5 mg total) by mouth once daily.    Hyperlipidemia, unspecified hyperlipidemia type  - Continue fenofibrate at this time. Dose was slightly increased on today's visit due to insurance coverage of lower dose.   -     Ambulatory Referral to Nutrition Services  -     fenofibrate 160 MG Tab;  Take 1 tablet (160 mg total) by mouth once daily.    Chest discomfort   - Discussed the differential. Patient does have risk factors for CAD (obesity, former smoker). However, her symptoms occur at rest. She does have hx of GERD. Will treat GERD as below for a few weeks to a month. If no improvement, will order cardiac stress testing.     Gastroesophageal reflux disease, esophagitis presence not specified  - Will treat with 4 weeks of PPI. Also gave patient a handout on lifestyle and diet changes related to reflux to work on.   -     pantoprazole (PROTONIX) 40 MG tablet; Take 1 tablet (40 mg total) by mouth once daily.    Sleep disturbance  - as recommended by her optho  -     Ambulatory referral to Sleep Disorders    Morbid obesity with BMI of 40.0-44.9, adult  - Patient plans to start exercising. She requests to see nutritionist.   -     Ambulatory referral to Sleep Disorders  -     Ambulatory Referral to Nutrition Services    Mild persistent asthma, uncomplicated  - Controlled. Continue current medication regimen.     Bipolar 1 disorder  Cognitive dysfunction in bipolar disorder  LIA (generalized anxiety disorder)  PMDD (premenstrual dysphoric disorder)  - Continue current medications per psych    Retinal disease, bilateral  - Progressively worsening. Continue management per optho

## 2019-09-17 ENCOUNTER — CLINICAL SUPPORT (OUTPATIENT)
Dept: OPHTHALMOLOGY | Facility: CLINIC | Age: 49
End: 2019-09-17
Payer: MEDICARE

## 2019-09-17 ENCOUNTER — OFFICE VISIT (OUTPATIENT)
Dept: INTERNAL MEDICINE | Facility: CLINIC | Age: 49
End: 2019-09-17
Payer: MEDICARE

## 2019-09-17 VITALS
WEIGHT: 199.06 LBS | BODY MASS INDEX: 40.13 KG/M2 | SYSTOLIC BLOOD PRESSURE: 106 MMHG | HEIGHT: 59 IN | HEART RATE: 101 BPM | OXYGEN SATURATION: 99 % | DIASTOLIC BLOOD PRESSURE: 84 MMHG

## 2019-09-17 DIAGNOSIS — H35.9: ICD-10-CM

## 2019-09-17 DIAGNOSIS — E78.5 HYPERLIPIDEMIA, UNSPECIFIED HYPERLIPIDEMIA TYPE: ICD-10-CM

## 2019-09-17 DIAGNOSIS — F41.1 GAD (GENERALIZED ANXIETY DISORDER): ICD-10-CM

## 2019-09-17 DIAGNOSIS — F32.81 PMDD (PREMENSTRUAL DYSPHORIC DISORDER): ICD-10-CM

## 2019-09-17 DIAGNOSIS — I10 ESSENTIAL HYPERTENSION: Primary | ICD-10-CM

## 2019-09-17 DIAGNOSIS — Z23 IMMUNIZATION DUE: Primary | ICD-10-CM

## 2019-09-17 DIAGNOSIS — K21.9 GASTROESOPHAGEAL REFLUX DISEASE, ESOPHAGITIS PRESENCE NOT SPECIFIED: ICD-10-CM

## 2019-09-17 DIAGNOSIS — G47.9 SLEEP DISTURBANCE: ICD-10-CM

## 2019-09-17 DIAGNOSIS — F31.9 COGNITIVE DYSFUNCTION IN BIPOLAR DISORDER: ICD-10-CM

## 2019-09-17 DIAGNOSIS — E66.01 MORBID OBESITY WITH BMI OF 40.0-44.9, ADULT: ICD-10-CM

## 2019-09-17 DIAGNOSIS — R07.89 CHEST DISCOMFORT: ICD-10-CM

## 2019-09-17 DIAGNOSIS — F06.8 COGNITIVE DYSFUNCTION IN BIPOLAR DISORDER: ICD-10-CM

## 2019-09-17 DIAGNOSIS — F31.9 BIPOLAR 1 DISORDER: ICD-10-CM

## 2019-09-17 DIAGNOSIS — J45.30 MILD PERSISTENT ASTHMA, UNCOMPLICATED: ICD-10-CM

## 2019-09-17 PROCEDURE — 90686 IIV4 VACC NO PRSV 0.5 ML IM: CPT | Mod: PBBFAC

## 2019-09-17 PROCEDURE — 99999 PR PBB SHADOW E&M-EST. PATIENT-LVL IV: CPT | Mod: PBBFAC,,, | Performed by: FAMILY MEDICINE

## 2019-09-17 PROCEDURE — 99999 PR PBB SHADOW E&M-EST. PATIENT-LVL IV: ICD-10-PCS | Mod: PBBFAC,,, | Performed by: FAMILY MEDICINE

## 2019-09-17 PROCEDURE — 99214 OFFICE O/P EST MOD 30 MIN: CPT | Mod: PBBFAC | Performed by: FAMILY MEDICINE

## 2019-09-17 PROCEDURE — 99205 OFFICE O/P NEW HI 60 MIN: CPT | Mod: S$PBB,,, | Performed by: FAMILY MEDICINE

## 2019-09-17 PROCEDURE — 99205 PR OFFICE/OUTPT VISIT, NEW, LEVL V, 60-74 MIN: ICD-10-PCS | Mod: S$PBB,,, | Performed by: FAMILY MEDICINE

## 2019-09-17 RX ORDER — PANTOPRAZOLE SODIUM 40 MG/1
40 TABLET, DELAYED RELEASE ORAL DAILY
Qty: 90 TABLET | Refills: 0 | Status: SHIPPED | OUTPATIENT
Start: 2019-09-17 | End: 2019-12-10 | Stop reason: SDUPTHER

## 2019-09-17 RX ORDER — PROPRANOLOL HYDROCHLORIDE 10 MG/1
10 TABLET ORAL 2 TIMES DAILY
COMMUNITY
End: 2020-07-23

## 2019-09-17 RX ORDER — FENOFIBRATE 134 MG/1
134 CAPSULE ORAL
COMMUNITY
End: 2019-09-17 | Stop reason: SDUPTHER

## 2019-09-17 RX ORDER — HYDROXYZINE PAMOATE 100 MG/1
100 CAPSULE ORAL NIGHTLY
COMMUNITY
End: 2020-07-23

## 2019-09-17 RX ORDER — FENOFIBRATE 160 MG/1
160 TABLET ORAL DAILY
Qty: 90 TABLET | Refills: 3 | Status: SHIPPED | OUTPATIENT
Start: 2019-09-17 | End: 2020-07-23 | Stop reason: SDUPTHER

## 2019-09-17 RX ORDER — FLUOXETINE 10 MG/1
10 CAPSULE ORAL DAILY
COMMUNITY
End: 2020-07-23

## 2019-09-17 RX ORDER — HYDROCHLOROTHIAZIDE 12.5 MG/1
12.5 CAPSULE ORAL DAILY
Qty: 90 CAPSULE | Refills: 3 | Status: SHIPPED | OUTPATIENT
Start: 2019-09-17 | End: 2020-07-23 | Stop reason: SDUPTHER

## 2019-09-17 RX ORDER — PROPRANOLOL HYDROCHLORIDE 10 MG/1
TABLET ORAL
COMMUNITY
End: 2019-09-17

## 2019-09-17 NOTE — PROGRESS NOTES
"Patient was given vaccine information sheet for the Flu Vaccine. The area of injection was palpated using the acromion process as a landmark. This area was cleaned with alcohol. Using a 25g 1" safety needle, 0.5mL of the vaccine was placed into the left deltoid muscle. The injection site was dressed with a bandage. Patient experienced no complications and was discharged in stable condition. Fluarix vaccine Lot: [id number] Exp: 06/30/2020.    "

## 2019-09-18 ENCOUNTER — TELEPHONE (OUTPATIENT)
Dept: INTERNAL MEDICINE | Facility: CLINIC | Age: 49
End: 2019-09-18

## 2019-09-18 PROBLEM — E66.01 MORBID OBESITY WITH BMI OF 40.0-44.9, ADULT: Status: ACTIVE | Noted: 2019-09-18

## 2019-09-18 PROBLEM — H35.9: Status: ACTIVE | Noted: 2019-09-18

## 2019-09-18 PROBLEM — F31.9 BIPOLAR 1 DISORDER: Status: ACTIVE | Noted: 2019-09-18

## 2019-09-18 PROBLEM — F41.1 GAD (GENERALIZED ANXIETY DISORDER): Status: ACTIVE | Noted: 2019-09-18

## 2019-09-18 PROBLEM — F32.81 PMDD (PREMENSTRUAL DYSPHORIC DISORDER): Status: ACTIVE | Noted: 2019-09-18

## 2019-09-18 PROBLEM — E78.5 HYPERLIPIDEMIA: Status: ACTIVE | Noted: 2019-09-18

## 2019-09-18 NOTE — PATIENT INSTRUCTIONS
"GERD (Adult)    The esophagus is a tube that carries food from the mouth to the stomach. A valve at the lower end of the esophagus prevents stomach acid from flowing upward. When this valve doesn't work properly, stomach contents may repeatedly flow back up (reflux) into the esophagus. This is called gastroesophageal reflux disease (GERD). GERD can irritate the esophagus. It can cause problems with swallowing or breathing. In severe cases, GERD can cause recurrent pneumonia or other serious problems.  Symptoms of reflux include burning, pressure or sharp pain in the upper abdomen or mid to lower chest. The pain can spread to the neck, back, or shoulder. There may be belching, an acid taste in the back of the throat, chronic cough, or sore throat or hoarseness. GERD symptoms often occur during the day after a big meal. They can also occur at night when lying down.   Home care  Lifestyle changes can help reduce symptoms. If needed, medicines may be prescribed. Symptoms often improve with treatment, but if treatment is stopped, the symptoms often return after a few months. So most persons with GERD will need to continue treatment.  Lifestyle changes  · Limit or avoid fatty, fried, and spicy foods, as well as coffee, chocolate, mint, and foods with high acid content such as tomatoes and citrus fruit and juices (orange, grapefruit, lemon).  · Dont eat large meals, especially at night. Frequent, smaller meals are best. Do not lie down right after eating. And dont eat anything 3 hours before going to bed.  · Avoid drinking alcohol and smoking. As much as possible, stay away from second hand smoke.  · If you are overweight, losing weight will reduce symptoms.   · Avoid wearing tight clothing around your stomach area.  · If your symptoms occur during sleep, use a foam wedge to elevate your upper body (not just your head.) Or, place 4" blocks under the head of your bed.  Medicines  If needed, medicines can help relieve the " symptoms of GERD and prevent damage to the esophagus. Discuss a medicine plan with your healthcare provider. This may include one or more of the following medicines:  · Antacids to help neutralize the normal acids in your stomach.  · Acid blockers (H2 blockers) to decrease acid production.  · Acid inhibitors (PPIs) to decrease acid production in a different way than the blockers. They may work better, but can take a little longer to take effect.  Take an antacid 30-60 minutes after eating and at bedtime, but not at the same time as an acid blocker.  Try not to take medicines such as ibuprofen and aspirin. If you are taking aspirin for your heart or other medical reasons, talk to your healthcare provider about stopping it.  Follow-up care  Follow up with your healthcare provider or as advised by our staff.  When to seek medical advice  Call your healthcare provider if any of the following occur:  · Stomach pain gets worse or moves to the lower right abdomen (appendix area)  · Chest pain appears or gets worse, or spreads to the back, neck, shoulder, or arm  · Frequent vomiting (cant keep down liquids)  · Blood in the stool or vomit (red or black in color)  · Feeling weak or dizzy  · Fever of 100.4ºF (38ºC) or higher, or as directed by your healthcare provider  Date Last Reviewed: 6/23/2015 © 2000-2017 [employer]. [address] [place] [address], [place] [address]. All rights reserved. This information is not intended as a substitute for professional medical care. Always follow your healthcare professional's instructions.        Lifestyle Changes for Controlling GERD  When you have GERD, stomach acid feels as if its backing up toward your mouth. Whether or not you take medicine to control your GERD, your symptoms can often be improved with lifestyle changes. Talk to your healthcare provider about the following suggestions. These suggestions may help you get relief from your symptoms.      Raise your  head  Reflux is more likely to strike when youre lying down flat, because stomach fluid can flow backward more easily. Raising the head of your bed 4 to 6 inches can help. To do this:  · Slide blocks or books under the legs at the head of your bed. Or, place a wedge under the mattress. Many foam stores can make a suitable wedge for you. The wedge should run from your waist to the top of your head.  · Dont just prop your head on several pillows. This increases pressure on your stomach. It can make GERD worse.  Watch your eating habits  Certain foods may increase the acid in your stomach or relax the lower esophageal sphincter. This makes GERD more likely. Its best to avoid the following if they cause you symptoms:  · Coffee, tea, and carbonated drinks (with and without caffeine)  · Fatty, fried, or spicy food  · Mint, chocolate, onions, and tomatoes  · Peppermint  · Any other foods that seem to irritate your stomach or cause you pain  Relieve the pressure  Tips include the following:  · Eat smaller meals, even if you have to eat more often.  · Dont lie down right after you eat. Wait a few hours for your stomach to empty.  · Avoid tight belts and tight-fitting clothes.  · Lose excess weight.  Tobacco and alcohol  Avoid smoking tobacco and drinking alcohol. They can make GERD symptoms worse.  Date Last Reviewed: 7/1/2016  © 4497-4212 [employer]. [address] [place] [address], [place] [address]. All rights reserved. This information is not intended as a substitute for professional medical care. Always follow your healthcare professional's instructions.

## 2019-09-18 NOTE — TELEPHONE ENCOUNTER
Left voice message for patient to schedule appointment from referral to Nutrition Services.  Mikhail MASCORRO  [phone]

## 2019-09-27 ENCOUNTER — TELEPHONE (OUTPATIENT)
Dept: ADMINISTRATIVE | Facility: OTHER | Age: 49
End: 2019-09-27

## 2019-10-24 ENCOUNTER — HOSPITAL ENCOUNTER (OUTPATIENT)
Dept: RADIOLOGY | Facility: OTHER | Age: 49
Discharge: HOME | End: 2019-10-24
Attending: OBSTETRICS & GYNECOLOGY
Payer: MEDICARE

## 2019-10-24 ENCOUNTER — OFFICE VISIT (OUTPATIENT)
Dept: OBSTETRICS AND GYNECOLOGY | Facility: CLINIC | Age: 49
End: 2019-10-24
Attending: OBSTETRICS & GYNECOLOGY
Payer: MEDICARE

## 2019-10-24 VITALS — HEIGHT: 59 IN | BODY MASS INDEX: 39.96 KG/M2 | WEIGHT: 198.19 LBS

## 2019-10-24 DIAGNOSIS — B37.2 YEAST INFECTION OF THE SKIN: ICD-10-CM

## 2019-10-24 DIAGNOSIS — Z12.4 ENCOUNTER FOR PAPANICOLAOU SMEAR FOR CERVICAL CANCER SCREENING: Primary | ICD-10-CM

## 2019-10-24 DIAGNOSIS — Z12.31 VISIT FOR SCREENING MAMMOGRAM: ICD-10-CM

## 2019-10-24 DIAGNOSIS — Z11.51 ENCOUNTER FOR SCREENING FOR HUMAN PAPILLOMAVIRUS (HPV): ICD-10-CM

## 2019-10-24 PROCEDURE — 77067 SCR MAMMO BI INCL CAD: CPT | Mod: 26,,, | Performed by: RADIOLOGY

## 2019-10-24 PROCEDURE — 77067 SCR MAMMO BI INCL CAD: CPT | Mod: TC

## 2019-10-24 PROCEDURE — 99999 PR PBB SHADOW E&M-EST. PATIENT-LVL III: CPT | Mod: PBBFAC,,, | Performed by: OBSTETRICS & GYNECOLOGY

## 2019-10-24 PROCEDURE — G0101 CA SCREEN;PELVIC/BREAST EXAM: HCPCS | Mod: S$PBB,,, | Performed by: OBSTETRICS & GYNECOLOGY

## 2019-10-24 PROCEDURE — 77067 MAMMO DIGITAL SCREENING BILAT WITH TOMOSYNTHESIS_CAD: ICD-10-PCS | Mod: 26,,, | Performed by: RADIOLOGY

## 2019-10-24 PROCEDURE — 77063 MAMMO DIGITAL SCREENING BILAT WITH TOMOSYNTHESIS_CAD: ICD-10-PCS | Mod: 26,,, | Performed by: RADIOLOGY

## 2019-10-24 PROCEDURE — G0101 PR CA SCREEN;PELVIC/BREAST EXAM: ICD-10-PCS | Mod: S$PBB,,, | Performed by: OBSTETRICS & GYNECOLOGY

## 2019-10-24 PROCEDURE — 88175 CYTOPATH C/V AUTO FLUID REDO: CPT

## 2019-10-24 PROCEDURE — 77063 BREAST TOMOSYNTHESIS BI: CPT | Mod: 26,,, | Performed by: RADIOLOGY

## 2019-10-24 PROCEDURE — 99213 OFFICE O/P EST LOW 20 MIN: CPT | Mod: PBBFAC | Performed by: OBSTETRICS & GYNECOLOGY

## 2019-10-24 PROCEDURE — 87624 HPV HI-RISK TYP POOLED RSLT: CPT

## 2019-10-24 PROCEDURE — 99999 PR PBB SHADOW E&M-EST. PATIENT-LVL III: ICD-10-PCS | Mod: PBBFAC,,, | Performed by: OBSTETRICS & GYNECOLOGY

## 2019-10-24 RX ORDER — HYDROXYZINE HYDROCHLORIDE 50 MG/1
TABLET, FILM COATED ORAL
Refills: 2 | COMMUNITY
Start: 2019-10-14 | End: 2020-07-23

## 2019-10-25 ENCOUNTER — PATIENT MESSAGE (OUTPATIENT)
Dept: OBSTETRICS AND GYNECOLOGY | Facility: CLINIC | Age: 49
End: 2019-10-25

## 2019-10-25 RX ORDER — NYSTATIN 100000 U/G
OINTMENT TOPICAL 2 TIMES DAILY
Qty: 1 TUBE | Refills: 1 | Status: SHIPPED | OUTPATIENT
Start: 2019-10-25 | End: 2020-07-23
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
## 2019-10-29 ENCOUNTER — TELEPHONE (OUTPATIENT)
Dept: OPHTHALMOLOGY | Facility: CLINIC | Age: 49
End: 2019-10-29

## 2019-10-29 NOTE — TELEPHONE ENCOUNTER
----- Message from Alyssa Meade sent at 10/29/2019  3:30 PM CDT -----  Pt wants to get 2nd opinion but first wants nurse to contact her     Pt contact [phone]

## 2019-10-30 LAB
HPV HR 12 DNA CVX QL NAA+PROBE: NEGATIVE
HPV16 AG SPEC QL: NEGATIVE
HPV18 DNA SPEC QL NAA+PROBE: NEGATIVE

## 2019-11-03 ENCOUNTER — PATIENT MESSAGE (OUTPATIENT)
Dept: INTERNAL MEDICINE | Facility: CLINIC | Age: 49
End: 2019-11-03

## 2019-11-03 ENCOUNTER — TELEPHONE (OUTPATIENT)
Dept: INTERNAL MEDICINE | Facility: CLINIC | Age: 49
End: 2019-11-03

## 2019-11-03 NOTE — TELEPHONE ENCOUNTER
Outside records received.    Labs 05/20/2019:    Hemoglobin A1c 5.2%  Total cholesterol 208  HDL 50  Triglycerides 211    ALT slightly elevated at 36  TSH normal  Vitamin-D 31

## 2019-11-25 ENCOUNTER — PATIENT MESSAGE (OUTPATIENT)
Dept: OBSTETRICS AND GYNECOLOGY | Facility: CLINIC | Age: 49
End: 2019-11-25

## 2019-11-25 ENCOUNTER — OFFICE VISIT (OUTPATIENT)
Dept: OPHTHALMOLOGY | Facility: CLINIC | Age: 49
End: 2019-11-25
Payer: MEDICARE

## 2019-11-25 DIAGNOSIS — B39.9 PRESUMED OCULAR HISTOPLASMOSIS SYNDROME (POHS) OF RIGHT EYE: Primary | ICD-10-CM

## 2019-11-25 DIAGNOSIS — H33.103 RETINOSCHISIS, BILATERAL: ICD-10-CM

## 2019-11-25 DIAGNOSIS — H32 PRESUMED OCULAR HISTOPLASMOSIS SYNDROME (POHS) OF RIGHT EYE: Primary | ICD-10-CM

## 2019-11-25 PROCEDURE — 99999 PR PBB SHADOW E&M-EST. PATIENT-LVL II: CPT | Mod: PBBFAC,,, | Performed by: OPHTHALMOLOGY

## 2019-11-25 PROCEDURE — 92004 COMPRE OPH EXAM NEW PT 1/>: CPT | Mod: S$PBB,,, | Performed by: OPHTHALMOLOGY

## 2019-11-25 PROCEDURE — 92225 PR SPECIAL EYE EXAM, INITIAL: CPT | Mod: 50,PBBFAC | Performed by: OPHTHALMOLOGY

## 2019-11-25 PROCEDURE — 92134 POSTERIOR SEGMENT OCT RETINA (OCULAR COHERENCE TOMOGRAPHY)-BOTH EYES: ICD-10-PCS | Mod: 26,S$PBB,, | Performed by: OPHTHALMOLOGY

## 2019-11-25 PROCEDURE — 99999 PR PBB SHADOW E&M-EST. PATIENT-LVL II: ICD-10-PCS | Mod: PBBFAC,,, | Performed by: OPHTHALMOLOGY

## 2019-11-25 PROCEDURE — 92225 PR SPECIAL EYE EXAM, INITIAL: CPT | Mod: 50,S$PBB,, | Performed by: OPHTHALMOLOGY

## 2019-11-25 PROCEDURE — 92004 PR EYE EXAM, NEW PATIENT,COMPREHESV: ICD-10-PCS | Mod: S$PBB,,, | Performed by: OPHTHALMOLOGY

## 2019-11-25 PROCEDURE — 92134 CPTRZ OPH DX IMG PST SGM RTA: CPT | Mod: PBBFAC | Performed by: OPHTHALMOLOGY

## 2019-11-25 PROCEDURE — 99212 OFFICE O/P EST SF 10 MIN: CPT | Mod: PBBFAC | Performed by: OPHTHALMOLOGY

## 2019-11-25 PROCEDURE — 92225 PR SPECIAL EYE EXAM, INITIAL: ICD-10-PCS | Mod: 50,S$PBB,, | Performed by: OPHTHALMOLOGY

## 2019-11-25 RX ORDER — LEVONORGESTREL / ETHINYL ESTRADIOL AND ETHINYL ESTRADIOL 150-30(84)
KIT ORAL
Qty: 91 EACH | Refills: 3 | Status: SHIPPED | OUTPATIENT
Start: 2019-11-25 | End: 2020-08-20

## 2019-11-25 NOTE — PROGRESS NOTES
HPI     Pt sts wanting to establish care with [facility]. And also get 2nd   opnion for retinochisis, chorioretinitis, Histoplasmosis capsulatum   retinitis, Myoptic degeneration. No drops, no flashes no floaters no   blurred vision.   Last edited by Nae Arevalo on 11/25/2019  7:50 AM. (History)        OCT - NO ME OU    A/P    1. POHS OD>OS  No CNVM    2. Prior retinoschisis per hx  Flat today    3. OCP use  No ocular contraindications      2 year OCT

## 2019-11-25 NOTE — LETTER
November 25, 2019      Randa Dumont MD  [address]  [address]  [place] [address]           [facility]  [address]  [place] [address]  Phone: [phone]  Fax: [phone]          Patient: Rosa Lau   MR Number: [id number]   YOB: 1970   Date of Visit: 11/25/2019       Dear Dr. Randa Dumont:    Thank you for referring Rosa Lau to me for evaluation. Attached you will find relevant portions of my assessment and plan of care.    If you have questions, please do not hesitate to call me. I look forward to following Rosa Lau along with you.    Sincerely,    NICHOLE Workman MD    Enclosure  CC:  No Recipients    If you would like to receive this communication electronically, please contact [email or web address] or [phone] to request more information on [employer] Link access.    For providers and/or their staff who would like to refer a patient to [facility], please contact us through our one-stop-shop provider referral line, [facility], at [phone].    If you feel you have received this communication in error or would no longer like to receive these types of communications, please e-mail [email or web address]

## 2019-12-10 DIAGNOSIS — K21.9 GASTROESOPHAGEAL REFLUX DISEASE, ESOPHAGITIS PRESENCE NOT SPECIFIED: ICD-10-CM

## 2019-12-11 RX ORDER — PANTOPRAZOLE SODIUM 40 MG/1
TABLET, DELAYED RELEASE ORAL
Qty: 90 TABLET | Refills: 0 | Status: SHIPPED | OUTPATIENT
Start: 2019-12-11 | End: 2020-03-08

## 2020-03-07 DIAGNOSIS — K21.9 GASTROESOPHAGEAL REFLUX DISEASE, ESOPHAGITIS PRESENCE NOT SPECIFIED: ICD-10-CM

## 2020-03-08 RX ORDER — PANTOPRAZOLE SODIUM 40 MG/1
TABLET, DELAYED RELEASE ORAL
Qty: 90 TABLET | Refills: 0 | Status: SHIPPED | OUTPATIENT
Start: 2020-03-08 | End: 2020-07-23 | Stop reason: SDUPTHER

## 2020-07-22 NOTE — PROGRESS NOTES
Subjective:       Patient ID: Rosa Lau is a 50 y.o. female.    Chief Complaint: Follow-up hypertension, GERD, asthma    HPI  Rosa Lau is a 50 y.o. year old female with bipolar disorder, PMDD, LIA, GERD, asthma, HTN, HLD, MCI, eye disorder who presents today for follow-up of hypertension, GERD, asthma.      Patient still complains of occasional chest discomfort that comes and goes.  She did mention this at her last visit as well.  She was started on pantoprazole, which does help.  She mentions a recent episode after eating was saw be for the 1st time where she developed chest discomfort that lasted for the whole night into the next day.  She took an antacid and sat slightly reclined and the pain did resolve within a short period time.  Says that these episodes never are exertional.  They always occur due to certain foods that she eats.    Eye disorder - has been diagnosed with Histomplasmosis capsulatum retinitis, Chorioretinitis  Retinoschisis, Myopic degeneration - follows with optho. Takes eye supplements (AREDS, fish oil, B complex, CoQ 10, carnitine). Goes to [facility]. Vision progressively worsening. Told about the diagnosis 15 years ago.     Bipolar 1 disorder, LIA, PMDD - takes Vraylar 6 mg daily, duloxetine 30 mg daily (for PMDD) and propranolol 20 mg BID. Takes clonazepam 2 mg in the AM and 1 mg in the PM to help with sleep. Sees psychiatry - Dr. Selena Weinstein and therpaist. Diagnosed at age 20.  Diagnosed with Mild Cognitive Impairment through neuropsych testing.     Asthma - takes Symbicort daily. Uses albuterol PRN (1-2x/week)    Hypertriglyceridemia - takes fenofibrate daily.     HTN - compliant with HCTZ 12.5 mg daily     OB/GYN History     LMP: takes OCP 3 months at a time   Sexually active:  Contraception: OCP    Health Maintenance  Pap smear: 10/24/19 - normal   Mammogram: 10/24/19 - normal   Colon Cancer Screening: Hx of colon polyps? Discuss with patient at next visit  "  DEXA: n/a  Hepatitis C screening:   Flu vaccine:   Tetanus vaccine: UTD  PNA vaccine: n/a  Shingles vaccine: n/a    I personally reviewed Past Medical History, Past Surgical History, Social History, and Family History    Review of Systems   Constitutional: Negative for chills, fatigue, fever and unexpected weight change.   HENT: Negative for congestion, hearing loss, rhinorrhea and sore throat.    Eyes: Negative for visual disturbance.   Respiratory: Negative for cough, shortness of breath and wheezing.    Cardiovascular: Negative for palpitations and leg swelling.        + occasional chest discomfort   Gastrointestinal: Negative for abdominal pain, constipation, diarrhea, nausea and vomiting.   Genitourinary: Negative for dysuria, frequency, menstrual problem and urgency.   Musculoskeletal: Negative for arthralgias and myalgias.   Skin: Negative for rash.        + right great toe toenail fungus   Neurological: Negative for dizziness, syncope and headaches.   Psychiatric/Behavioral: Negative for dysphoric mood and sleep disturbance. The patient is not nervous/anxious.        Objective:      Vitals:    07/23/20 0739   BP: 120/88   Pulse: 97   SpO2: 97%   Weight: 88.4 kg (194 lb 14.2 oz)   Height: 4' 11" (1.499 m)     Physical Exam  Vitals signs and nursing note reviewed.   Constitutional:       General: She is not in acute distress.     Appearance: Normal appearance. She is well-developed.   HENT:      Head: Normocephalic and atraumatic.      Right Ear: Hearing normal.      Left Ear: Hearing normal.      Nose: Nose normal.      Mouth/Throat:      Pharynx: No oropharyngeal exudate.   Eyes:      General: Lids are normal.      Conjunctiva/sclera: Conjunctivae normal.      Pupils: Pupils are equal, round, and reactive to light.   Neck:      Musculoskeletal: Normal range of motion.      Thyroid: No thyroid mass or thyromegaly.   Cardiovascular:      Rate and Rhythm: Normal rate and regular rhythm.      Heart sounds: S1 " normal and S2 normal. No murmur.      Comments: No lower extremity edema.   Pulmonary:      Effort: Pulmonary effort is normal. No respiratory distress.      Breath sounds: Normal breath sounds.   Chest:      Chest wall: No tenderness.   Abdominal:      General: Bowel sounds are normal.      Palpations: Abdomen is soft.      Tenderness: There is no abdominal tenderness.   Musculoskeletal:        Feet:    Lymphadenopathy:      Cervical: No cervical adenopathy.      Upper Body:      Right upper body: No supraclavicular adenopathy.      Left upper body: No supraclavicular adenopathy.   Skin:     General: Skin is warm and dry.      Findings: No rash.   Neurological:      Mental Status: She is alert and oriented to person, place, and time.   Psychiatric:         Behavior: Behavior normal.         Thought Content: Thought content normal.         Assessment:       1. Essential hypertension    2. Hyperlipidemia, unspecified hyperlipidemia type    3. Gastroesophageal reflux disease, esophagitis presence not specified    4. Mild persistent asthma, uncomplicated    5. Severe obesity (BMI 35.0-35.9 with comorbidity)    6. Onychomycosis of right great toe    7. Body mass index (bmi) 39.0-39.9, adult     8. Encounter for hepatitis C screening test for low risk patient    9. Other long term (current) drug therapy     10. Bipolar 1 disorder    11. LIA (generalized anxiety disorder)    12. PMDD (premenstrual dysphoric disorder)    13. Cognitive dysfunction in bipolar disorder    14. Retinal disease, bilateral        Plan:     Essential hypertension  Controlled. Continue current medication regimen.   -     CBC auto differential; Future; Expected date: 07/23/2020  -     Comprehensive metabolic panel; Future; Expected date: 07/23/2020  -     Lipid Panel; Future; Expected date: 07/23/2020  -     TSH; Future; Expected date: 07/23/2020  -     Vitamin D; Future; Expected date: 07/23/2020  -     Hemoglobin A1C; Future; Expected date:  07/23/2020  -     Microalbumin/creatinine urine ratio; Future; Expected date: 07/23/2020  -     hydroCHLOROthiazide (MICROZIDE) 12.5 mg capsule; Take 1 capsule (12.5 mg total) by mouth once daily.  Dispense: 90 capsule; Refill: 3    Hyperlipidemia, unspecified hyperlipidemia type  Continue fenofibrate.  -     fenofibrate 160 MG Tab; Take 1 tablet (160 mg total) by mouth once daily.  Dispense: 90 tablet; Refill: 3    Gastroesophageal reflux disease, esophagitis presence not specified  Discussed with patient that her chest discomfort seems to most resemble acid reflux.  Discussed diet changes (she likes to eat a lot of spicy food) and to take pantoprazole daily.  Advised her that if her chest discomfort changes or if it becomes exertional we will need to further evaluate this for cardiac causes.  Also, if her acid reflux is worsening or if she has symptoms even with compliance with diet and Protonix she will need to be referred to GI for endoscopy.  -     pantoprazole (PROTONIX) 40 MG tablet; TAKE 1 TABLET(40 MG) BY MOUTH EVERY DAY  Dispense: 90 tablet; Refill: 3    Mild persistent asthma, uncomplicated  Controlled. Continue current medication regimen.   -     VENTOLIN HFA 90 mcg/actuation inhaler; Inhale 1-2 puffs into the lungs every 6 (six) hours as needed for Wheezing or Shortness of Breath.  Dispense: 18 g; Refill: 5    Severe obesity (BMI 35.0-35.9 with comorbidity)  Patient has been working on increasing physical activity and healthy diet.  She has been successful some weight loss.    Onychomycosis of right great toe  -     Ambulatory referral/consult to Podiatry; Future; Expected date: 07/30/2020    Body mass index (bmi) 39.0-39.9, adult   -     Vitamin D; Future; Expected date: 07/23/2020    Encounter for hepatitis C screening test for low risk patient  -     Hepatitis C Antibody; Future; Expected date: 07/23/2020    Other long term (current) drug therapy   -     Hemoglobin A1C; Future; Expected date:  07/23/2020    Bipolar 1 disorder  LIA (generalized anxiety disorder)  PMDD (premenstrual dysphoric disorder)  Cognitive dysfunction in bipolar disorder  Continue current management per Psychiatry.    Retinal disease, bilateral  Continue current management per ophthalmology.    I personally reviewed the patient's medical record, including physician notes, imaging, and labs that were available to me today.

## 2020-07-23 ENCOUNTER — OFFICE VISIT (OUTPATIENT)
Dept: INTERNAL MEDICINE | Facility: CLINIC | Age: 50
End: 2020-07-23
Payer: MEDICARE

## 2020-07-23 ENCOUNTER — LAB VISIT (OUTPATIENT)
Dept: LAB | Facility: OTHER | Age: 50
End: 2020-07-23
Attending: FAMILY MEDICINE
Payer: MEDICARE

## 2020-07-23 VITALS
OXYGEN SATURATION: 97 % | DIASTOLIC BLOOD PRESSURE: 88 MMHG | HEIGHT: 59 IN | WEIGHT: 194.88 LBS | SYSTOLIC BLOOD PRESSURE: 120 MMHG | BODY MASS INDEX: 39.29 KG/M2 | HEART RATE: 97 BPM

## 2020-07-23 DIAGNOSIS — Z11.59 ENCOUNTER FOR HEPATITIS C SCREENING TEST FOR LOW RISK PATIENT: ICD-10-CM

## 2020-07-23 DIAGNOSIS — Z79.899 OTHER LONG TERM (CURRENT) DRUG THERAPY: ICD-10-CM

## 2020-07-23 DIAGNOSIS — H35.9: ICD-10-CM

## 2020-07-23 DIAGNOSIS — E78.5 HYPERLIPIDEMIA, UNSPECIFIED HYPERLIPIDEMIA TYPE: ICD-10-CM

## 2020-07-23 DIAGNOSIS — I10 ESSENTIAL HYPERTENSION: ICD-10-CM

## 2020-07-23 DIAGNOSIS — B35.1 ONYCHOMYCOSIS OF RIGHT GREAT TOE: ICD-10-CM

## 2020-07-23 DIAGNOSIS — E66.01 SEVERE OBESITY (BMI 35.0-35.9 WITH COMORBIDITY): ICD-10-CM

## 2020-07-23 DIAGNOSIS — F32.81 PMDD (PREMENSTRUAL DYSPHORIC DISORDER): ICD-10-CM

## 2020-07-23 DIAGNOSIS — I10 ESSENTIAL HYPERTENSION: Primary | ICD-10-CM

## 2020-07-23 DIAGNOSIS — F31.9 BIPOLAR 1 DISORDER: ICD-10-CM

## 2020-07-23 DIAGNOSIS — F31.9 COGNITIVE DYSFUNCTION IN BIPOLAR DISORDER: ICD-10-CM

## 2020-07-23 DIAGNOSIS — F06.8 COGNITIVE DYSFUNCTION IN BIPOLAR DISORDER: ICD-10-CM

## 2020-07-23 DIAGNOSIS — K21.9 GASTROESOPHAGEAL REFLUX DISEASE, ESOPHAGITIS PRESENCE NOT SPECIFIED: ICD-10-CM

## 2020-07-23 DIAGNOSIS — F41.1 GAD (GENERALIZED ANXIETY DISORDER): ICD-10-CM

## 2020-07-23 DIAGNOSIS — J45.30 MILD PERSISTENT ASTHMA, UNCOMPLICATED: ICD-10-CM

## 2020-07-23 LAB
25(OH)D3+25(OH)D2 SERPL-MCNC: 41 NG/ML (ref 30–96)
ALBUMIN SERPL BCP-MCNC: 4.4 G/DL (ref 3.5–5.2)
ALP SERPL-CCNC: 33 U/L (ref 55–135)
ALT SERPL W/O P-5'-P-CCNC: 15 U/L (ref 10–44)
ANION GAP SERPL CALC-SCNC: 12 MMOL/L (ref 8–16)
AST SERPL-CCNC: 15 U/L (ref 10–40)
BASOPHILS # BLD AUTO: 0.09 K/UL (ref 0–0.2)
BASOPHILS NFR BLD: 1 % (ref 0–1.9)
BILIRUB SERPL-MCNC: 0.4 MG/DL (ref 0.1–1)
BUN SERPL-MCNC: 15 MG/DL (ref 6–20)
CALCIUM SERPL-MCNC: 9.6 MG/DL (ref 8.7–10.5)
CHLORIDE SERPL-SCNC: 100 MMOL/L (ref 95–110)
CO2 SERPL-SCNC: 25 MMOL/L (ref 23–29)
CREAT SERPL-MCNC: 1 MG/DL (ref 0.5–1.4)
DIFFERENTIAL METHOD: ABNORMAL
EOSINOPHIL # BLD AUTO: 0.3 K/UL (ref 0–0.5)
EOSINOPHIL NFR BLD: 3.6 % (ref 0–8)
ERYTHROCYTE [DISTWIDTH] IN BLOOD BY AUTOMATED COUNT: 12.7 % (ref 11.5–14.5)
EST. GFR  (AFRICAN AMERICAN): >60 ML/MIN/1.73 M^2
EST. GFR  (NON AFRICAN AMERICAN): >60 ML/MIN/1.73 M^2
ESTIMATED AVG GLUCOSE: 97 MG/DL (ref 68–131)
GLUCOSE SERPL-MCNC: 96 MG/DL (ref 70–110)
HBA1C MFR BLD HPLC: 5 % (ref 4–5.6)
HCT VFR BLD AUTO: 48.6 % (ref 37–48.5)
HGB BLD-MCNC: 16.3 G/DL (ref 12–16)
IMM GRANULOCYTES # BLD AUTO: 0.04 K/UL (ref 0–0.04)
IMM GRANULOCYTES NFR BLD AUTO: 0.5 % (ref 0–0.5)
LYMPHOCYTES # BLD AUTO: 2.7 K/UL (ref 1–4.8)
LYMPHOCYTES NFR BLD: 31.5 % (ref 18–48)
MCH RBC QN AUTO: 30.2 PG (ref 27–31)
MCHC RBC AUTO-ENTMCNC: 33.5 G/DL (ref 32–36)
MCV RBC AUTO: 90 FL (ref 82–98)
MONOCYTES # BLD AUTO: 0.4 K/UL (ref 0.3–1)
MONOCYTES NFR BLD: 4.3 % (ref 4–15)
NEUTROPHILS # BLD AUTO: 5.1 K/UL (ref 1.8–7.7)
NEUTROPHILS NFR BLD: 59.1 % (ref 38–73)
NRBC BLD-RTO: 0 /100 WBC
PLATELET # BLD AUTO: 369 K/UL (ref 150–350)
PMV BLD AUTO: 8.9 FL (ref 9.2–12.9)
POTASSIUM SERPL-SCNC: 4.1 MMOL/L (ref 3.5–5.1)
PROT SERPL-MCNC: 7.9 G/DL (ref 6–8.4)
RBC # BLD AUTO: 5.4 M/UL (ref 4–5.4)
SODIUM SERPL-SCNC: 137 MMOL/L (ref 136–145)
TSH SERPL DL<=0.005 MIU/L-ACNC: 3.26 UIU/ML (ref 0.4–4)
WBC # BLD AUTO: 8.59 K/UL (ref 3.9–12.7)

## 2020-07-23 PROCEDURE — 80061 LIPID PANEL: CPT

## 2020-07-23 PROCEDURE — 80053 COMPREHEN METABOLIC PANEL: CPT

## 2020-07-23 PROCEDURE — 84443 ASSAY THYROID STIM HORMONE: CPT

## 2020-07-23 PROCEDURE — 82306 VITAMIN D 25 HYDROXY: CPT

## 2020-07-23 PROCEDURE — 99214 PR OFFICE/OUTPT VISIT, EST, LEVL IV, 30-39 MIN: ICD-10-PCS | Mod: S$PBB,,, | Performed by: FAMILY MEDICINE

## 2020-07-23 PROCEDURE — 99214 OFFICE O/P EST MOD 30 MIN: CPT | Mod: S$PBB,,, | Performed by: FAMILY MEDICINE

## 2020-07-23 PROCEDURE — 86803 HEPATITIS C AB TEST: CPT

## 2020-07-23 PROCEDURE — 85025 COMPLETE CBC W/AUTO DIFF WBC: CPT

## 2020-07-23 PROCEDURE — 83036 HEMOGLOBIN GLYCOSYLATED A1C: CPT

## 2020-07-23 PROCEDURE — 99999 PR PBB SHADOW E&M-EST. PATIENT-LVL IV: CPT | Mod: PBBFAC,,, | Performed by: FAMILY MEDICINE

## 2020-07-23 PROCEDURE — 99999 PR PBB SHADOW E&M-EST. PATIENT-LVL IV: ICD-10-PCS | Mod: PBBFAC,,, | Performed by: FAMILY MEDICINE

## 2020-07-23 PROCEDURE — 36415 COLL VENOUS BLD VENIPUNCTURE: CPT

## 2020-07-23 PROCEDURE — 99214 OFFICE O/P EST MOD 30 MIN: CPT | Mod: PBBFAC | Performed by: FAMILY MEDICINE

## 2020-07-23 RX ORDER — PANTOPRAZOLE SODIUM 40 MG/1
TABLET, DELAYED RELEASE ORAL
Qty: 90 TABLET | Refills: 3 | Status: SHIPPED | OUTPATIENT
Start: 2020-07-23 | End: 2022-01-31 | Stop reason: SDUPTHER

## 2020-07-23 RX ORDER — HYDROCHLOROTHIAZIDE 12.5 MG/1
12.5 CAPSULE ORAL DAILY
Qty: 90 CAPSULE | Refills: 3 | Status: SHIPPED | OUTPATIENT
Start: 2020-07-23 | End: 2021-07-16 | Stop reason: SDUPTHER

## 2020-07-23 RX ORDER — ALBUTEROL SULFATE 90 UG/1
1-2 AEROSOL, METERED RESPIRATORY (INHALATION) EVERY 6 HOURS PRN
Qty: 18 G | Refills: 5 | Status: SHIPPED | OUTPATIENT
Start: 2020-07-23 | End: 2021-07-16 | Stop reason: SDUPTHER

## 2020-07-23 RX ORDER — FENOFIBRATE 160 MG/1
160 TABLET ORAL DAILY
Qty: 90 TABLET | Refills: 3 | Status: SHIPPED | OUTPATIENT
Start: 2020-07-23 | End: 2021-07-16 | Stop reason: SDUPTHER

## 2020-07-23 RX ORDER — PROPRANOLOL HYDROCHLORIDE 20 MG/1
20 TABLET ORAL 2 TIMES DAILY
COMMUNITY
End: 2023-10-11 | Stop reason: SDUPTHER

## 2020-07-23 RX ORDER — DULOXETIN HYDROCHLORIDE 30 MG/1
30 CAPSULE, DELAYED RELEASE ORAL DAILY
COMMUNITY
End: 2021-07-16

## 2020-07-24 LAB
CHOLEST SERPL-MCNC: 169 MG/DL (ref 120–199)
CHOLEST/HDLC SERPL: 5.1 {RATIO} (ref 2–5)
HCV AB SERPL QL IA: NEGATIVE
HDLC SERPL-MCNC: 33 MG/DL (ref 40–75)
HDLC SERPL: 19.5 % (ref 20–50)
LDLC SERPL CALC-MCNC: 116.8 MG/DL (ref 63–159)
NONHDLC SERPL-MCNC: 136 MG/DL
TRIGL SERPL-MCNC: 96 MG/DL (ref 30–150)

## 2020-07-28 ENCOUNTER — PATIENT OUTREACH (OUTPATIENT)
Dept: ADMINISTRATIVE | Facility: OTHER | Age: 50
End: 2020-07-28

## 2020-07-28 ENCOUNTER — OFFICE VISIT (OUTPATIENT)
Dept: PODIATRY | Facility: CLINIC | Age: 50
End: 2020-07-28
Payer: MEDICARE

## 2020-07-28 VITALS
SYSTOLIC BLOOD PRESSURE: 131 MMHG | BODY MASS INDEX: 39.11 KG/M2 | HEIGHT: 59 IN | DIASTOLIC BLOOD PRESSURE: 74 MMHG | WEIGHT: 194 LBS | HEART RATE: 105 BPM

## 2020-07-28 DIAGNOSIS — B35.1 ONYCHOMYCOSIS OF RIGHT GREAT TOE: ICD-10-CM

## 2020-07-28 DIAGNOSIS — L60.3 ONYCHODYSTROPHY: Primary | ICD-10-CM

## 2020-07-28 DIAGNOSIS — L03.031 PARONYCHIA, TOE, RIGHT: ICD-10-CM

## 2020-07-28 PROCEDURE — 99999 PR PBB SHADOW E&M-EST. PATIENT-LVL IV: ICD-10-PCS | Mod: PBBFAC,,, | Performed by: PODIATRIST

## 2020-07-28 PROCEDURE — 99999 PR PBB SHADOW E&M-EST. PATIENT-LVL IV: CPT | Mod: PBBFAC,,, | Performed by: PODIATRIST

## 2020-07-28 PROCEDURE — 99203 OFFICE O/P NEW LOW 30 MIN: CPT | Mod: S$PBB,,, | Performed by: PODIATRIST

## 2020-07-28 PROCEDURE — 99203 PR OFFICE/OUTPT VISIT, NEW, LEVL III, 30-44 MIN: ICD-10-PCS | Mod: S$PBB,,, | Performed by: PODIATRIST

## 2020-07-28 PROCEDURE — 99214 OFFICE O/P EST MOD 30 MIN: CPT | Mod: PBBFAC,PN | Performed by: PODIATRIST

## 2020-07-28 RX ORDER — TOBRAMYCIN 3 MG/ML
SOLUTION/ DROPS OPHTHALMIC
Qty: 5 ML | Refills: 3 | Status: SHIPPED | OUTPATIENT
Start: 2020-07-28 | End: 2020-11-05
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
## 2020-07-28 NOTE — PROGRESS NOTES
After Visit Summary      Facility     Name Address Phone       [facility] [address] [facility] [address] [phone]            Patient Discharge Summary   3/9/2017    Tony Roy            Please bring this medication reconciliation form to your next doctor’s appointment(s). Please update the form if you stop taking any of these medications or you start taking any new medications including over the counter medications. Also please carry a copy of this form with you at all times in the event of an emergency.    A copy of these discharge instructions was reviewed with and given to the patient/patient representative/Guardian/Caregiver at discharge.          The doctor who took care of you in the hospital     Provider Specialty    Mildred Artis DO Emergency Medicine    Tu Villarreal MD Internal Medicine - Pulmonary Disease      Allergies as of 3/20/2017     No Known Allergies           Discharge Medications              What to Do with Your Medications      START taking these medications today unless otherwise stated        Details    Morning Noon Evening Bedtime As needed    ramelteon 8 MG tablet   Commonly known as:  ROZEREM   Next Dose Due:  Take tonight 3/20/2017         Take 0.5-1 tablets by mouth nightly.   Authorizing Provider:  Tu Villarreal   Last Dose:  4 mg on 3/19/2017  8:21 PM                                                        CONTINUE taking these medications which have CHANGED        Details    Morning Noon Evening Bedtime As needed    insulin glargine 100 UNIT/ML injectable solution   Commonly known as:  LANTUS   What changed:    - how much to take  - when to take this   Next Dose Due:  Next dose due, tomorrow 3/21/2017         Inject 35 Units into the skin daily.   Authorizing Provider:  Tu Villarreal   Last Dose:  35 Units on 3/20/2017 11:31 AM                               insulin lispro 100 UNIT/ML sliding scale  Chart reviewed.   Immunizations: Triggered Imm Registry     Orders placed: n/a  Upcoming appts to satisfy SOLANGE topics: n/a         injection   Commonly known as:  HumaLOG   What changed:  additional instructions   Next Dose Due:  Take as ordered with meals         Inject under the skin 3 times a day with meals: Per sliding scale: Glucose <150 no insulin, 150-200 give 2 units, 201-250 give 4 units, 251-300 give 6 units, 301-350 give 8 units, 351-400 give 10 units   Authorizing Provider:  Tu Villarreal                                                        CONTINUE taking these medications which have NOT CHANGED        Details    Morning Noon Evening Bedtime As needed    acetaminophen 325 MG tablet   Commonly known as:  TYLENOL        Take 2 tablets by mouth every 4 hours as needed for Pain or Fever.   Authorizing Provider:  Charis Moya                               amLODIPine 5 MG tablet   Commonly known as:  NORVASC   Next Dose Due:  Next dose due 3/21/2017        Take 5 mg by mouth daily.   Last Dose:  5 mg on 3/20/2017  8:44 AM                               aspirin 325 MG EC tablet   Next Dose Due:  Next dose due 3/21/2017         Take 1 tablet by mouth daily.   Authorizing Provider:  Anders Horton   Last Dose:  325 mg on 3/20/2017  8:44 AM                               carvedilol 12.5 MG tablet   Commonly known as:  COREG   Next Dose Due:  Next dose due tonight 3/20/2017         Take 1 tablet by mouth 2 times daily.   Authorizing Provider:  Anders Horton   Last Dose:  12.5 mg on 3/20/2017  8:43 AM                               clopidogrel 75 MG tablet   Commonly known as:  PLAVIX   Next Dose Due:  Next dose due 3/21/2017        Take 1 tablet by mouth daily.   Authorizing Provider:  Anders Horton   Last Dose:  75 mg on 3/20/2017  8:44 AM                               cyanocobalamin 1000 MCG tablet   Next Dose Due:  Next dose due 3/21/2017         Take 1 tablet by mouth daily. Indications: Inadequate Vitamin B12   Authorizing Provider:  Anders Horton   Last Dose:  1,000 mcg on 3/20/2017  8:44 AM                                donepezil 5 MG tablet   Commonly known as:  ARICEPT   Next Dose Due:  Next dose due 3/20/2017         Take 1 tablet by mouth nightly.   Authorizing Provider:  Anders Horton   Last Dose:  5 mg on 3/19/2017  8:21 PM                               ergocalciferol 86019 units capsule   Commonly known as:  DRISDOL   Next Dose Due:  Next dose due March 27, 2017        Once a week   Authorizing Provider:  Anders Horton   Last Dose:  50,000 Units on 3/20/2017  8:44 AM                               eucerin cream        Apply 3 times daily topically on dry areas particularly feet.   Authorizing Provider:  Florina Ramirez                               furosemide 20 MG tablet   Commonly known as:  LASIX   Next Dose Due:  Next dose due 3/21/2017         Take 1 tablet by mouth daily.   Authorizing Provider:  Charis Moya   Last Dose:  20 mg on 3/20/2017  8:43 AM                               lisinopril 2.5 MG tablet   Commonly known as:  ZESTRIL   Next Dose Due:  Take tonight 3/20/2017        Take 1 tablet by mouth nightly.   Authorizing Provider:  Anders Horton   Last Dose:  2.5 mg on 3/19/2017  8:21 PM                               memantine 5 MG tablet   Commonly known as:  NAMENDA   Next Dose Due:  Next dose due tonight 3/20/2017        Take 1 tablet by mouth 2 times daily.   Authorizing Provider:  Anders Horton   Last Dose:  5 mg on 3/20/2017  8:43 AM                               simvastatin 20 MG tablet   Commonly known as:  ZOCOR   Next Dose Due:  Next dose due, 3/20/2017         Take 1 tablet by mouth daily.   Authorizing Provider:  Anders Horton   Last Dose:  20 mg on 3/19/2017  8:21 PM                                                        STOP taking these medications, discuss with your Pharmacist        Reason for stopping Comments    digoxin 0.125 MG tablet   Commonly known as:  LANOXIN             melatonin 3 MG   Notes to Patient:  Stop taking - Changed to ramelteon                                     Where to Get Your Medications      You are receiving a paper prescription for the following medications, you can take these to any pharmacy.     Bring a paper prescription for each of these medications     insulin glargine 100 UNIT/ML injectable solution    ramelteon 8 MG tablet               Your To Do List     Future Appointments Provider Department Dept Phone    3/27/2017 12:30 PM SDT DEVICE CHECK [facility] [phone]    4/3/2017 1:00 PM Mark Kimbrough DPM [facility] [phone]        Discharge Instructions       MEDICATIONS:  · See Medication List (bring to your doctor appointments    VACCINES:  Most Recent Immunizations   Administered Date(s) Administered   • INFLUENZA QUADRIVALENT 10/18/2016   • Influenza 10/15/2013   • Pneumococcal Conjugate 13 Valent 10/18/2016   • Pneumococcal Polysaccharide Adult 01/07/2015   • Tdap 03/16/2015       ACTIVITY:  · Weight yourself daily (first thing in the morning, with same amount of clothes on) unless told otherwise by your doctor.  · Continue activity as your were in the hospital, slowly increase to what you were doing previously.  · 24 hour supervision, assistance with walking.     SMOKING:  · Avoid all tobacco products and second hand smoke.  · Smoking Cessation Counseling offered.  · [place] Toll Free Quit Line: [phone]    DIET:  · Limit salt and salty foods unless told otherwise by your doctor.  · Special Diet: Diabetic diet     · Trouble breathing or chest pain - CALL 911    CONTACT YOUR DOCTOR IF:  · You have symptoms that are not \"normal\" for you.  · You have new or worse symptoms or pain, not relieved by medicine or rest.  · Temperature greater than 101 degrees F, chills or flu like symptoms.  · You gain more than 3 pounds in 2 days.  ·           Discharge References/Attachments     BALANCE PROBLEMS, MANAGING: VESTIBULAR REHABILITATION THERAPY (ENGLISH)    DEMENTIA PATIENTS, DAILY  CARE FOR: FOR CAREGIVERS (ENGLISH)      Pending Labs/Results     Any lab or diagnostic test results that are \"pending\" at time of discharge are listed below. If no results display then none were \"pending\" at time of discharge. Depending on when the test was completed results may be available within 3-5 days. Results can be reviewed with your provider at your next visits or through [employer]. If needed contact the provider office for additional information.         Patient Portal Signup     Manage health care for you and your family anytime, anywhere with the new [employer], your free online resource for quick and easy access to personal health information, scheduling appointments, refilling prescriptions, viewing test results, paying bills and more.  Sign up for a free and secure account. Please follow the instructions below to securely complete your enrollment.     1. Go to [email or web address]  2. Click Sign Up Now   3. Enter the Activation Code when prompted     Activation Code: Activation code not generated  Current [employer] Status: Account disabled    If you have questions related to [employer], you can email [email or web address] or call [phone] to talk to our [employer] staff.  For questions related to your health, contact your physician’s office.  Please remember to dial 911 for medical emergencies.         Your Opinion Matters To Us  If you receive a patient satisfaction survey in the mail, please complete and return it in the postage-paid envelope.    We truly value and appreciate your feedback.           Tony Roy        Disposition       Most Recent Value    Receiving facility name [facility] Filed at: 03/09/2017 1506      Your To Do List     Future Appointments Provider Department Dept Phone    3/27/2017 12:30 PM SDT DEVICE CHECK [facility] [phone]    4/3/2017 1:00 PM Mark Kimbrough DPM [facility] [phone]      Contact  your doctor for follow-up appointment if not already scheduled.     Follow up with Anders Horton MD.    Specialty:  Internal Medicine    Contact information    [address] [place] [address] [phone]           Tony Roy           Summary of your Discharge Medications      Take these Medications        Details    Morning Noon Evening Bedtime As needed    acetaminophen 325 MG tablet   Commonly known as:  TYLENOL    Take 2 tablets by mouth every 4 hours as needed for Pain or Fever.                               amLODIPine 5 MG tablet   Commonly known as:  NORVASC    Take 5 mg by mouth daily.                               aspirin 325 MG EC tablet    Take 1 tablet by mouth daily.                               carvedilol 12.5 MG tablet   Commonly known as:  COREG    Take 1 tablet by mouth 2 times daily.                               clopidogrel 75 MG tablet   Commonly known as:  PLAVIX    Take 1 tablet by mouth daily.                               cyanocobalamin 1000 MCG tablet    Take 1 tablet by mouth daily. Indications: Inadequate Vitamin B12                               donepezil 5 MG tablet   Commonly known as:  ARICEPT    Take 1 tablet by mouth nightly.                               ergocalciferol 16022 units capsule   Commonly known as:  DRISDOL    Once a week                               eucerin cream    Apply 3 times daily topically on dry areas particularly feet.                               furosemide 20 MG tablet   Commonly known as:  LASIX    Take 1 tablet by mouth daily.                               insulin glargine 100 UNIT/ML injectable solution   Commonly known as:  LANTUS    Inject 35 Units into the skin daily.                               insulin lispro 100 UNIT/ML sliding scale injection   Commonly known as:  HumaLOG    Inject under the skin 3 times a day with meals: Per sliding scale: Glucose <150 no insulin, 150-200 give 2 units, 201-250 give 4 units, 251-300 give 6 units,  301-350 give 8 units, 351-400 give 10 units                               lisinopril 2.5 MG tablet   Commonly known as:  ZESTRIL    Take 1 tablet by mouth nightly.                               memantine 5 MG tablet   Commonly known as:  NAMENDA    Take 1 tablet by mouth 2 times daily.                               ramelteon 8 MG tablet   Commonly known as:  ROZEREM    Take 0.5-1 tablets by mouth nightly.                               simvastatin 20 MG tablet   Commonly known as:  ZOCOR    Take 1 tablet by mouth daily.                                                             Outpatient Administered Medication List      Notice     You have not been prescribed any facility-administered medications.

## 2020-07-31 ENCOUNTER — TELEPHONE (OUTPATIENT)
Dept: INTERNAL MEDICINE | Facility: CLINIC | Age: 50
End: 2020-07-31

## 2020-07-31 ENCOUNTER — PATIENT MESSAGE (OUTPATIENT)
Dept: INTERNAL MEDICINE | Facility: CLINIC | Age: 50
End: 2020-07-31

## 2020-08-04 ENCOUNTER — OFFICE VISIT (OUTPATIENT)
Dept: INTERNAL MEDICINE | Facility: CLINIC | Age: 50
End: 2020-08-04
Payer: MEDICARE

## 2020-08-04 ENCOUNTER — TELEPHONE (OUTPATIENT)
Dept: INTERNAL MEDICINE | Facility: CLINIC | Age: 50
End: 2020-08-04

## 2020-08-04 ENCOUNTER — OFFICE VISIT (OUTPATIENT)
Dept: PODIATRY | Facility: CLINIC | Age: 50
End: 2020-08-04
Payer: MEDICARE

## 2020-08-04 VITALS
HEIGHT: 59 IN | BODY MASS INDEX: 39.11 KG/M2 | SYSTOLIC BLOOD PRESSURE: 133 MMHG | HEART RATE: 115 BPM | DIASTOLIC BLOOD PRESSURE: 62 MMHG | WEIGHT: 194 LBS | TEMPERATURE: 98 F

## 2020-08-04 DIAGNOSIS — F32.81 PMDD (PREMENSTRUAL DYSPHORIC DISORDER): ICD-10-CM

## 2020-08-04 DIAGNOSIS — D75.1 POLYCYTHEMIA: Primary | ICD-10-CM

## 2020-08-04 DIAGNOSIS — B35.1 ONYCHOMYCOSIS OF RIGHT GREAT TOE: Primary | ICD-10-CM

## 2020-08-04 DIAGNOSIS — F31.9 BIPOLAR 1 DISORDER: ICD-10-CM

## 2020-08-04 DIAGNOSIS — F41.1 GAD (GENERALIZED ANXIETY DISORDER): ICD-10-CM

## 2020-08-04 DIAGNOSIS — E78.5 HYPERLIPIDEMIA, UNSPECIFIED HYPERLIPIDEMIA TYPE: ICD-10-CM

## 2020-08-04 DIAGNOSIS — K21.9 GASTROESOPHAGEAL REFLUX DISEASE, ESOPHAGITIS PRESENCE NOT SPECIFIED: ICD-10-CM

## 2020-08-04 DIAGNOSIS — I10 ESSENTIAL HYPERTENSION: ICD-10-CM

## 2020-08-04 DIAGNOSIS — L03.031 PARONYCHIA, TOE, RIGHT: ICD-10-CM

## 2020-08-04 DIAGNOSIS — L60.3 ONYCHODYSTROPHY: ICD-10-CM

## 2020-08-04 DIAGNOSIS — J45.30 MILD PERSISTENT ASTHMA, UNCOMPLICATED: ICD-10-CM

## 2020-08-04 PROCEDURE — 99214 OFFICE O/P EST MOD 30 MIN: CPT | Mod: 95,,, | Performed by: FAMILY MEDICINE

## 2020-08-04 PROCEDURE — 11750 EXCISION NAIL&NAIL MATRIX: CPT | Mod: PBBFAC,PN | Performed by: PODIATRIST

## 2020-08-04 PROCEDURE — 99499 UNLISTED E&M SERVICE: CPT | Mod: S$PBB,,, | Performed by: PODIATRIST

## 2020-08-04 PROCEDURE — 99999 PR PBB SHADOW E&M-EST. PATIENT-LVL IV: ICD-10-PCS | Mod: PBBFAC,,, | Performed by: PODIATRIST

## 2020-08-04 PROCEDURE — 99499 NO LOS: ICD-10-PCS | Mod: S$PBB,,, | Performed by: PODIATRIST

## 2020-08-04 PROCEDURE — 11750 NAIL REMOVAL: ICD-10-PCS | Mod: T5,S$PBB,, | Performed by: PODIATRIST

## 2020-08-04 PROCEDURE — 99214 OFFICE O/P EST MOD 30 MIN: CPT | Mod: PBBFAC,PN,25 | Performed by: PODIATRIST

## 2020-08-04 PROCEDURE — 99999 PR PBB SHADOW E&M-EST. PATIENT-LVL IV: CPT | Mod: PBBFAC,,, | Performed by: PODIATRIST

## 2020-08-04 PROCEDURE — 99214 PR OFFICE/OUTPT VISIT, EST, LEVL IV, 30-39 MIN: ICD-10-PCS | Mod: 95,,, | Performed by: FAMILY MEDICINE

## 2020-08-04 RX ORDER — LIDOCAINE HYDROCHLORIDE 20 MG/ML
JELLY TOPICAL
Qty: 30 ML | Refills: 2 | Status: SHIPPED | OUTPATIENT
Start: 2020-08-04 | End: 2020-11-20

## 2020-08-04 NOTE — TELEPHONE ENCOUNTER
Please assist patient with scheduling blood work to do as soon as possible.  Please also assist her with scheduling hematology appointment.    Thanks!

## 2020-08-04 NOTE — PROCEDURES
Nail Removal    Date/Time: 8/4/2020 7:59 AM  Performed by: Efrem Clancy DPM  Authorized by: Efrem Clancy DPM     Consent Done?:  Yes (Written)    Location:  Right foot  Location detail:  Right big toe  Anesthesia:  Digital block  Local anesthetic: lidocaine 2% without epinephrine  Anesthetic total (ml):  4  Preparation:  Skin prepped with alcohol    Amount removed:  Complete  Wedge excision of skin of nail fold: No    Nail bed sutured?: No    Nail matrix removed:  Complete  Removed nail replaced and anchored: No    Dressing applied:  Antibiotic ointment and dressing applied  Patient tolerance:  Patient tolerated the procedure well with no immediate complications     Time out performed prior to anesthetizing the surgical area and all patient identifiers, procedures, and site markings are in agreement.

## 2020-08-04 NOTE — PROGRESS NOTES
Subjective:      Patient ID: Rosa Lau is a 50 y.o. female.    Chief Complaint: Ingrown Toenail (Right Great toenail)    Thick discolored misshapen toenail right hallux with pain.  Gradual onset, no improvement over past  week, aggravated by increased weight bearing, shoe gear, pressure.  Failed 8 months antifungal topically.  Denies surgery - relates insiting trauma about 3 years ago.    Review of Systems   Constitution: Negative for chills, diaphoresis, fever, malaise/fatigue and night sweats.   Cardiovascular: Negative for claudication, cyanosis, leg swelling and syncope.   Skin: Positive for nail changes. Negative for color change, dry skin, rash, suspicious lesions and unusual hair distribution.   Musculoskeletal: Negative for falls, joint pain, joint swelling, muscle cramps, muscle weakness and stiffness.   Gastrointestinal: Negative for constipation, diarrhea, nausea and vomiting.   Neurological: Negative for brief paralysis, disturbances in coordination, focal weakness, numbness, paresthesias, sensory change and tremors.           Objective:      Physical Exam  Constitutional:       General: She is not in acute distress.     Appearance: She is well-developed. She is not diaphoretic.   Cardiovascular:      Pulses:           Popliteal pulses are 2+ on the right side and 2+ on the left side.        Dorsalis pedis pulses are 2+ on the right side and 2+ on the left side.        Posterior tibial pulses are 2+ on the right side and 2+ on the left side.      Comments: Capillary refill 3 seconds all toes/distal feet, all toes/both feet warm to touch.      Negative lymphadenopathy bilateral popliteal fossa and tarsal tunnel.      Negavie lower extremity edema bilateral.    Musculoskeletal:      Right ankle: She exhibits normal range of motion, no swelling, no ecchymosis, no deformity, no laceration and normal pulse. Achilles tendon normal. Achilles tendon exhibits no pain, no defect and normal Saez's test  results.      Comments: Normal angle, base, station of gait. All ten toes without clubbing, cyanosis, or signs of ischemia.  No pain to palpation bilateral lower extremities.  Range of motion, stability, muscle strength, and muscle tone normal bilateral feet and legs.    Lymphadenopathy:      Lower Body: No right inguinal adenopathy. No left inguinal adenopathy.      Comments: Negative lymphadenopathy bilateral popliteal fossa and tarsal tunnel.    Negative lymphangitic streaking bilateral feet/ankles/legs.   Skin:     General: Skin is warm and dry.      Capillary Refill: Capillary refill takes 2 to 3 seconds.      Coloration: Skin is not pale.      Findings: No abrasion, bruising, burn, ecchymosis, erythema, laceration, lesion or rash.      Nails: There is no clubbing.        Comments:   Skin is normal age and health appropriate color, turgor, texture, and temperature bilateral lower extremities without ulceration, hyperpigmentation, discoloration, masses nodules or cords palpated.  No ecchymosis, erythema, edema, or cardinal signs of infection bilateral lower extremities.    Toenails 1st,right are hypertrophic thickened 2-3 mm, dystrophic, discolored tanish brown with tan, gray crumbly subungual debris.  Tender to distal nail plate pressure, without periungual skin abnormality of each.     Neurological:      Mental Status: She is alert and oriented to person, place, and time.      Sensory: No sensory deficit.      Motor: No tremor, atrophy or abnormal muscle tone.      Gait: Gait normal.      Deep Tendon Reflexes:      Reflex Scores:       Patellar reflexes are 2+ on the right side and 2+ on the left side.       Achilles reflexes are 2+ on the right side and 2+ on the left side.     Comments: Negative tinel sign to percussion sural, superficial peroneal, deep peroneal, saphenous, and posterior tibial nerves right and left ankles and feet.     Psychiatric:         Behavior: Behavior is cooperative.                Assessment:       Encounter Diagnoses   Name Primary?    Onychomycosis of right great toe Yes    Onychodystrophy     Paronychia, toe, right          Plan:       Rosa was seen today for ingrown toenail.    Diagnoses and all orders for this visit:    Onychomycosis of right great toe    Onychodystrophy    Paronychia, toe, right      I counseled the patient on her conditions, their implications and medical management.    Dress right hallux all times with simple band aid type dressing changing daily.    Discussed conservative treatment with shoes of adequate dimensions, material, and style to alleviate symptoms and delay or prevent surgical intervention.    Dispense sx shoe today - ambulate to tolerance - wean to casual shoes when symptoms allow.    Matrixectomy - right hallux nail - total.    Tobramycin drops bid.          Follow up in about 2 weeks (around 8/18/2020) for post op right hallux total matrixectomy.

## 2020-08-04 NOTE — PROGRESS NOTES
Subjective:       Patient ID: Rosa Lau is a 50 y.o. female.    Chief Complaint: follow-up lab work, asthma, GERD    HPI  Rosa Lau is a 50 y.o. year old female with bipolar disorder, PMDD, LIA, GERD, asthma, HTN, HLD, MCI, eye disorder who presents today for follow-up lab work, asthma, GERD.    The patient location is:  Patient's home in [place]    The chief complaint leading to consultation is: follow-up lab work, asthma, GERD  Visit type: audiovisual  Total time spent with patient: 30 mins  Each patient to whom he or she provides medical services by telemedicine is:  (1) informed of the relationship between the physician and patient and the respective role of any other health care provider with respect to management of the patient; and (2) notified that he or she may decline to receive medical services by telemedicine and may withdraw from such care at any time.  Patient agreed to this telemedicine visit today in an effort to avoid face-to-face visits due to the current COVID 19 pandemic.    Recent lab work revealed polycythemia.  I do not have any other lab work to compare this to.  However, patient reports she has been told that her hemoglobin was elevated about a year ago.  She drinks about 80 oz of water daily and denies possibility of dehydration as a cause.  She is a nonsmoker.  Lab Results   Component Value Date    WBC 8.59 07/23/2020    HGB 16.3 (H) 07/23/2020    HCT 48.6 (H) 07/23/2020    MCV 90 07/23/2020     (H) 07/23/2020     Eye disorder - has been diagnosed with Histomplasmosis capsulatum retinitis, Chorioretinitis  Retinoschisis, Myopic degeneration - follows with optho. Takes eye supplements (AREDS, fish oil, B complex, CoQ 10, carnitine). Goes to [facility]. Vision progressively worsening. Told about the diagnosis 15 years ago.     Bipolar 1 disorder, LIA, PMDD - takes Vraylar 6 mg daily, duloxetine 30 mg daily (for PMDD) and propranolol 20 mg BID. Takes clonazepam  2 mg in the AM and 1 mg in the PM to help with sleep. Sees psychiatry - Dr. Selena Weinstein and therpaist. Diagnosed at age 20.  Diagnosed with Mild Cognitive Impairment through neuropsych testing.     Asthma - takes Symbicort daily. Uses albuterol PRN (1-2x/week)    Hypertriglyceridemia - takes fenofibrate daily.     HTN - compliant with HCTZ 12.5 mg daily     OB/GYN History     LMP: takes OCP 3 months at a time   Sexually active:  Contraception: OCP    Health Maintenance  Pap smear: 10/24/19 - normal   Mammogram: 10/24/19 - normal   Colon Cancer Screening: Hx of colon polyps? Discuss with patient at next visit   DEXA: n/a  Hepatitis C screening: negative   Flu vaccine:   Tetanus vaccine: UTD  PNA vaccine: n/a  Shingles vaccine: n/a    I personally reviewed Past Medical History, Past Surgical History, Social History, and Family History    Review of Systems   Constitutional: Negative for chills, fatigue and fever.   HENT: Negative for congestion, hearing loss and rhinorrhea.    Eyes: Negative for visual disturbance.   Respiratory: Negative for cough, shortness of breath and wheezing.    Cardiovascular: Negative for chest pain.   Gastrointestinal: Negative for abdominal pain, constipation, diarrhea, nausea and vomiting.   Skin: Negative for rash.   Neurological: Negative for dizziness, syncope and headaches.   Psychiatric/Behavioral: Negative for dysphoric mood and sleep disturbance. The patient is not nervous/anxious.        Objective:      There were no vitals filed for this visit.  Physical Exam  Constitutional:       General: She is not in acute distress.     Appearance: She is well-developed. She is not diaphoretic.   HENT:      Head: Normocephalic and atraumatic.   Eyes:      Conjunctiva/sclera: Conjunctivae normal.   Neck:      Musculoskeletal: Normal range of motion.   Pulmonary:      Effort: Pulmonary effort is normal. No respiratory distress.   Neurological:      Mental Status: She is alert and oriented to  person, place, and time.   Psychiatric:         Behavior: Behavior normal.         Thought Content: Thought content normal.         Assessment:       1. Polycythemia    2. Gastroesophageal reflux disease, esophagitis presence not specified    3. Mild persistent asthma, uncomplicated    4. Essential hypertension    5. Hyperlipidemia, unspecified hyperlipidemia type    6. Bipolar 1 disorder    7. LIA (generalized anxiety disorder)    8. PMDD (premenstrual dysphoric disorder)        Plan:     Polycythemia  Labs ordered to further evaluate.  Patient is a nonsmoker and she denies any possibility of dehydration with her last labs.  She denies any known family history of polycythemia.  Will also refer to hematology for further evaluation if labs persistently abnormal.  -     Ambulatory referral/consult to Hematology / Oncology; Future; Expected date: 08/11/2020  -     Pathologist Interpretation Differential; Future; Expected date: 08/04/2020  -     CBC auto differential; Future; Expected date: 08/04/2020  -     ERYTHROPOIETIN; Future; Expected date: 08/04/2020    Gastroesophageal reflux disease, esophagitis presence not specified  Controlled. Continue PPI.     Mild persistent asthma, uncomplicated  Controlled. Continue current medication regimen.     Essential hypertension  Blood pressure at goal at her last visit.  Patient saw podiatry today and her blood pressure was 133/62.  Continue current medication regimen.    Hyperlipidemia, unspecified hyperlipidemia type  Continue current medication regimen    Bipolar 1 disorder  LIA (generalized anxiety disorder)  PMDD (premenstrual dysphoric disorder)  Continue current management per Psychiatry.    I personally reviewed the patient's medical record, including physician notes, imaging, and labs that were available to me today.

## 2020-08-11 ENCOUNTER — LAB VISIT (OUTPATIENT)
Dept: LAB | Facility: OTHER | Age: 50
End: 2020-08-11
Attending: FAMILY MEDICINE
Payer: MEDICARE

## 2020-08-11 DIAGNOSIS — D75.1 POLYCYTHEMIA: ICD-10-CM

## 2020-08-11 LAB
BASOPHILS # BLD AUTO: 0.07 K/UL (ref 0–0.2)
BASOPHILS NFR BLD: 1.1 % (ref 0–1.9)
DIFFERENTIAL METHOD: ABNORMAL
EOSINOPHIL # BLD AUTO: 0.3 K/UL (ref 0–0.5)
EOSINOPHIL NFR BLD: 4 % (ref 0–8)
ERYTHROCYTE [DISTWIDTH] IN BLOOD BY AUTOMATED COUNT: 12.8 % (ref 11.5–14.5)
HCT VFR BLD AUTO: 48.1 % (ref 37–48.5)
HGB BLD-MCNC: 15.9 G/DL (ref 12–16)
IMM GRANULOCYTES # BLD AUTO: 0.03 K/UL (ref 0–0.04)
IMM GRANULOCYTES NFR BLD AUTO: 0.5 % (ref 0–0.5)
LYMPHOCYTES # BLD AUTO: 2.3 K/UL (ref 1–4.8)
LYMPHOCYTES NFR BLD: 35.6 % (ref 18–48)
MCH RBC QN AUTO: 29.9 PG (ref 27–31)
MCHC RBC AUTO-ENTMCNC: 33.1 G/DL (ref 32–36)
MCV RBC AUTO: 90 FL (ref 82–98)
MONOCYTES # BLD AUTO: 0.8 K/UL (ref 0.3–1)
MONOCYTES NFR BLD: 11.6 % (ref 4–15)
NEUTROPHILS # BLD AUTO: 3.1 K/UL (ref 1.8–7.7)
NEUTROPHILS NFR BLD: 47.2 % (ref 38–73)
NRBC BLD-RTO: 0 /100 WBC
PATH REV BLD -IMP: NORMAL
PATH REV BLD -IMP: NORMAL
PLATELET # BLD AUTO: 216 K/UL (ref 150–350)
PMV BLD AUTO: 8.9 FL (ref 9.2–12.9)
RBC # BLD AUTO: 5.32 M/UL (ref 4–5.4)
WBC # BLD AUTO: 6.55 K/UL (ref 3.9–12.7)

## 2020-08-11 PROCEDURE — 85025 COMPLETE CBC W/AUTO DIFF WBC: CPT

## 2020-08-11 PROCEDURE — 85060 BLOOD SMEAR INTERPRETATION: CPT | Mod: ,,, | Performed by: PATHOLOGY

## 2020-08-11 PROCEDURE — 85060 PATHOLOGIST REVIEW: ICD-10-PCS | Mod: ,,, | Performed by: PATHOLOGY

## 2020-08-11 PROCEDURE — 82668 ASSAY OF ERYTHROPOIETIN: CPT

## 2020-08-12 ENCOUNTER — OFFICE VISIT (OUTPATIENT)
Dept: HEMATOLOGY/ONCOLOGY | Facility: CLINIC | Age: 50
End: 2020-08-12
Payer: MEDICARE

## 2020-08-12 ENCOUNTER — TELEPHONE (OUTPATIENT)
Dept: HEMATOLOGY/ONCOLOGY | Facility: CLINIC | Age: 50
End: 2020-08-12

## 2020-08-12 ENCOUNTER — HOSPITAL ENCOUNTER (OUTPATIENT)
Dept: RADIOLOGY | Facility: HOSPITAL | Age: 50
Discharge: HOME | End: 2020-08-12
Attending: STUDENT IN AN ORGANIZED HEALTH CARE EDUCATION/TRAINING PROGRAM
Payer: MEDICARE

## 2020-08-12 ENCOUNTER — HOSPITAL ENCOUNTER (OUTPATIENT)
Dept: CARDIOLOGY | Facility: CLINIC | Age: 50
Discharge: HOME | End: 2020-08-12
Payer: MEDICARE

## 2020-08-12 VITALS
SYSTOLIC BLOOD PRESSURE: 136 MMHG | TEMPERATURE: 99 F | WEIGHT: 196.88 LBS | BODY MASS INDEX: 39.77 KG/M2 | HEART RATE: 120 BPM | RESPIRATION RATE: 16 BRPM | OXYGEN SATURATION: 98 % | DIASTOLIC BLOOD PRESSURE: 66 MMHG

## 2020-08-12 DIAGNOSIS — R00.0 TACHYCARDIA: ICD-10-CM

## 2020-08-12 DIAGNOSIS — R06.09 DYSPNEA ON EXERTION: ICD-10-CM

## 2020-08-12 DIAGNOSIS — R07.9 CHEST PAIN, UNSPECIFIED TYPE: ICD-10-CM

## 2020-08-12 DIAGNOSIS — D75.1 POLYCYTHEMIA: Primary | ICD-10-CM

## 2020-08-12 PROCEDURE — 99999 PR PBB SHADOW E&M-EST. PATIENT-LVL V: CPT | Mod: PBBFAC,,, | Performed by: INTERNAL MEDICINE

## 2020-08-12 PROCEDURE — 93005 ELECTROCARDIOGRAM TRACING: CPT | Mod: PBBFAC | Performed by: INTERNAL MEDICINE

## 2020-08-12 PROCEDURE — 71275 CT ANGIOGRAPHY CHEST: CPT | Mod: TC

## 2020-08-12 PROCEDURE — 99215 OFFICE O/P EST HI 40 MIN: CPT | Mod: PBBFAC,25 | Performed by: INTERNAL MEDICINE

## 2020-08-12 PROCEDURE — 99999 PR PBB SHADOW E&M-EST. PATIENT-LVL V: ICD-10-PCS | Mod: PBBFAC,,, | Performed by: INTERNAL MEDICINE

## 2020-08-12 PROCEDURE — 25500020 PHARM REV CODE 255: Performed by: STUDENT IN AN ORGANIZED HEALTH CARE EDUCATION/TRAINING PROGRAM

## 2020-08-12 PROCEDURE — 99205 OFFICE O/P NEW HI 60 MIN: CPT | Mod: S$PBB,GC,, | Performed by: INTERNAL MEDICINE

## 2020-08-12 PROCEDURE — 71275 CTA CHEST NON CORONARY: ICD-10-PCS | Mod: 26,,, | Performed by: RADIOLOGY

## 2020-08-12 PROCEDURE — 93010 EKG 12-LEAD: ICD-10-PCS | Mod: S$PBB,,, | Performed by: INTERNAL MEDICINE

## 2020-08-12 PROCEDURE — 99205 PR OFFICE/OUTPT VISIT, NEW, LEVL V, 60-74 MIN: ICD-10-PCS | Mod: S$PBB,GC,, | Performed by: INTERNAL MEDICINE

## 2020-08-12 PROCEDURE — 93010 ELECTROCARDIOGRAM REPORT: CPT | Mod: S$PBB,,, | Performed by: INTERNAL MEDICINE

## 2020-08-12 PROCEDURE — 71275 CT ANGIOGRAPHY CHEST: CPT | Mod: 26,,, | Performed by: RADIOLOGY

## 2020-08-12 RX ADMIN — IOHEXOL 100 ML: 350 INJECTION, SOLUTION INTRAVENOUS at 04:08
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
## 2020-08-12 NOTE — TELEPHONE ENCOUNTER
Discussed negative troponin and d dimer 0.55 with patient and her spouse over the phone. Instructed to remain NPO per CT instructions, and present to imaging center at [facility] for scheduled CTA Chest. I will follow up on results.     CTA 8/12/20 Impression:     No pulmonary thromboembolism, pulmonary infarct or evidence of right heart strain.    Informed pt of negative test results this evening. Instructed to f/u on EKG results, eliminating OCP, continuing to w/u tachycardia with PCP.

## 2020-08-12 NOTE — LETTER
August 17, 2020      Venita Paz MD  [address]  [address]  [facility] [address]           [facility]  [address]  [facility] [address]  Phone: [phone]          Patient: Rosa Lau   MR Number: [id number]   YOB: 1970   Date of Visit: 8/12/2020       Dear Dr. Venita Paz:    Thank you for referring Rosa Lau to me for evaluation. Attached you will find relevant portions of my assessment and plan of care.    If you have questions, please do not hesitate to call me. I look forward to following Rosa Lau along with you.    Sincerely,    Sal Camacho MD    Enclosure  CC:  No Recipients    If you would like to receive this communication electronically, please contact [email or web address] or [phone] to request more information on [employer] Link access.    For providers and/or their staff who would like to refer a patient to [facility], please contact us through our one-stop-shop provider referral line, [facility], at [phone].    If you feel you have received this communication in error or would no longer like to receive these types of communications, please e-mail [email or web address]

## 2020-08-16 LAB — EPO SERPL-ACNC: 19 MIU/ML (ref 2.6–18.5)

## 2020-08-18 ENCOUNTER — OFFICE VISIT (OUTPATIENT)
Dept: PODIATRY | Facility: CLINIC | Age: 50
End: 2020-08-18
Payer: MEDICARE

## 2020-08-18 VITALS
TEMPERATURE: 98 F | SYSTOLIC BLOOD PRESSURE: 136 MMHG | HEIGHT: 59 IN | WEIGHT: 196 LBS | BODY MASS INDEX: 39.51 KG/M2 | HEART RATE: 114 BPM | DIASTOLIC BLOOD PRESSURE: 61 MMHG

## 2020-08-18 DIAGNOSIS — Z98.890 POST-OPERATIVE STATE: Primary | ICD-10-CM

## 2020-08-18 PROCEDURE — 99214 OFFICE O/P EST MOD 30 MIN: CPT | Mod: PBBFAC,PN | Performed by: PODIATRIST

## 2020-08-18 PROCEDURE — 99999 PR PBB SHADOW E&M-EST. PATIENT-LVL IV: ICD-10-PCS | Mod: PBBFAC,,, | Performed by: PODIATRIST

## 2020-08-18 PROCEDURE — 99999 PR PBB SHADOW E&M-EST. PATIENT-LVL IV: CPT | Mod: PBBFAC,,, | Performed by: PODIATRIST

## 2020-08-18 PROCEDURE — 99024 POSTOP FOLLOW-UP VISIT: CPT | Mod: POP,,, | Performed by: PODIATRIST

## 2020-08-18 PROCEDURE — 99024 PR POST-OP FOLLOW-UP VISIT: ICD-10-PCS | Mod: POP,,, | Performed by: PODIATRIST

## 2020-08-18 NOTE — PROGRESS NOTES
Subjective:      Patient ID: Rosa Lau is a 50 y.o. female.    Chief Complaint: Post-op Evaluation (P&A Proc.)    2 weeks s/p right matrixectomy total hallux.  Dressing all times with band aid type dressings.  Using topical antibiotics daily.  Surgical shoe right.  Denies trauma, signs infection.  Minimal pain in evenings.    Review of Systems   Constitution: Negative for chills, diaphoresis, fever, malaise/fatigue and night sweats.   Cardiovascular: Negative for claudication, cyanosis, leg swelling and syncope.   Skin: Positive for nail changes. Negative for color change, dry skin, rash, suspicious lesions and unusual hair distribution.   Musculoskeletal: Negative for falls, joint pain, joint swelling, muscle cramps, muscle weakness and stiffness.   Gastrointestinal: Negative for constipation, diarrhea, nausea and vomiting.   Neurological: Negative for brief paralysis, disturbances in coordination, focal weakness, numbness, paresthesias, sensory change and tremors.           Objective:      Physical Exam  Constitutional:       General: She is not in acute distress.     Appearance: She is well-developed. She is not diaphoretic.   Cardiovascular:      Pulses:           Popliteal pulses are 2+ on the right side and 2+ on the left side.        Dorsalis pedis pulses are 2+ on the right side and 2+ on the left side.        Posterior tibial pulses are 2+ on the right side and 2+ on the left side.      Comments: Capillary refill 3 seconds all toes/distal feet, all toes/both feet warm to touch.      Negative lymphadenopathy bilateral popliteal fossa and tarsal tunnel.      Negavie lower extremity edema bilateral.    Musculoskeletal:      Right ankle: She exhibits normal range of motion, no swelling, no ecchymosis, no deformity, no laceration and normal pulse. Achilles tendon normal. Achilles tendon exhibits no pain, no defect and normal Saez's test results.   Lymphadenopathy:      Lower Body: No right inguinal  adenopathy. No left inguinal adenopathy.      Comments: Negative lymphadenopathy bilateral popliteal fossa and tarsal tunnel.    Negative lymphangitic streaking bilateral feet/ankles/legs.   Skin:     General: Skin is warm and dry.      Capillary Refill: Capillary refill takes 2 to 3 seconds.      Coloration: Skin is not pale.      Findings: No abrasion, bruising, burn, ecchymosis, erythema, laceration, lesion or rash.      Nails: There is no clubbing.        Comments:   Wound right hallux nailbed is pink, granular  without drainage, pus, tracking, fluctuance, malodor, or cardinal signs infection.    Otherwise, Skin is normal age and health appropriate color, turgor, texture, and temperature bilateral lower extremities without ulceration, hyperpigmentation, discoloration, masses nodules or cords palpated.  No ecchymosis, erythema, edema, or cardinal signs of infection bilateral lower extremities.     Neurological:      Mental Status: She is alert and oriented to person, place, and time.      Sensory: No sensory deficit.      Motor: No tremor, atrophy or abnormal muscle tone.      Gait: Gait normal.      Deep Tendon Reflexes:      Reflex Scores:       Patellar reflexes are 2+ on the right side and 2+ on the left side.       Achilles reflexes are 2+ on the right side and 2+ on the left side.  Psychiatric:         Behavior: Behavior is cooperative.               Assessment:       Encounter Diagnosis   Name Primary?    Post-operative state Yes         Plan:       Rosa was seen today for post-op evaluation.    Diagnoses and all orders for this visit:    Post-operative state      I counseled the patient on her conditions, their implications and medical management.        Continue current wound care until completely healed.    Discussed conservative treatment with shoes of adequate dimensions, material, and style to alleviate symptoms and delay or prevent surgical intervention.            Follow up if symptoms worsen or  fail to improve.

## 2020-08-19 ENCOUNTER — TELEPHONE (OUTPATIENT)
Dept: HEMATOLOGY/ONCOLOGY | Facility: CLINIC | Age: 50
End: 2020-08-19

## 2020-08-19 NOTE — TELEPHONE ENCOUNTER
Attempted to reach Ms. Lau regarding results. No answer. Will call again at a later time.     Sal Camacho MD

## 2020-08-20 ENCOUNTER — OFFICE VISIT (OUTPATIENT)
Dept: SLEEP MEDICINE | Facility: CLINIC | Age: 50
End: 2020-08-20
Payer: MEDICARE

## 2020-08-20 ENCOUNTER — OFFICE VISIT (OUTPATIENT)
Dept: INTERNAL MEDICINE | Facility: CLINIC | Age: 50
End: 2020-08-20
Payer: MEDICARE

## 2020-08-20 VITALS
WEIGHT: 192.69 LBS | BODY MASS INDEX: 38.84 KG/M2 | HEART RATE: 120 BPM | DIASTOLIC BLOOD PRESSURE: 84 MMHG | HEIGHT: 59 IN | SYSTOLIC BLOOD PRESSURE: 120 MMHG

## 2020-08-20 VITALS
WEIGHT: 192.69 LBS | HEART RATE: 129 BPM | DIASTOLIC BLOOD PRESSURE: 84 MMHG | BODY MASS INDEX: 38.84 KG/M2 | SYSTOLIC BLOOD PRESSURE: 120 MMHG | OXYGEN SATURATION: 96 % | HEIGHT: 59 IN

## 2020-08-20 DIAGNOSIS — J45.30 MILD PERSISTENT ASTHMA, UNCOMPLICATED: ICD-10-CM

## 2020-08-20 DIAGNOSIS — G44.219 EPISODIC TENSION-TYPE HEADACHE, NOT INTRACTABLE: Primary | ICD-10-CM

## 2020-08-20 DIAGNOSIS — F41.1 GAD (GENERALIZED ANXIETY DISORDER): ICD-10-CM

## 2020-08-20 DIAGNOSIS — J30.9 ALLERGIC RHINITIS, UNSPECIFIED SEASONALITY, UNSPECIFIED TRIGGER: ICD-10-CM

## 2020-08-20 DIAGNOSIS — G47.30 SLEEP APNEA IN ADULT: ICD-10-CM

## 2020-08-20 DIAGNOSIS — G47.33 OSA (OBSTRUCTIVE SLEEP APNEA): ICD-10-CM

## 2020-08-20 DIAGNOSIS — F31.9 BIPOLAR 1 DISORDER: ICD-10-CM

## 2020-08-20 DIAGNOSIS — I10 ESSENTIAL HYPERTENSION: ICD-10-CM

## 2020-08-20 DIAGNOSIS — G47.30 SLEEP APNEA, UNSPECIFIED TYPE: ICD-10-CM

## 2020-08-20 DIAGNOSIS — D75.1 POLYCYTHEMIA: ICD-10-CM

## 2020-08-20 DIAGNOSIS — D75.1 POLYCYTHEMIA: Primary | ICD-10-CM

## 2020-08-20 DIAGNOSIS — R06.83 SNORING: ICD-10-CM

## 2020-08-20 DIAGNOSIS — E66.9 OBESITY (BMI 35.0-39.9 WITHOUT COMORBIDITY): ICD-10-CM

## 2020-08-20 PROBLEM — G44.209 TENSION TYPE HEADACHE: Status: ACTIVE | Noted: 2020-08-20

## 2020-08-20 PROBLEM — R51.9 HEADACHE: Status: ACTIVE | Noted: 2020-08-20

## 2020-08-20 PROBLEM — E78.2 MIXED HYPERLIPIDEMIA: Status: ACTIVE | Noted: 2019-09-18

## 2020-08-20 PROBLEM — R06.09 DOE (DYSPNEA ON EXERTION): Status: ACTIVE | Noted: 2020-08-20

## 2020-08-20 PROCEDURE — 99214 OFFICE O/P EST MOD 30 MIN: CPT | Mod: PBBFAC,27 | Performed by: INTERNAL MEDICINE

## 2020-08-20 PROCEDURE — 99999 PR PBB SHADOW E&M-EST. PATIENT-LVL V: ICD-10-PCS | Mod: PBBFAC,,, | Performed by: INTERNAL MEDICINE

## 2020-08-20 PROCEDURE — 99215 OFFICE O/P EST HI 40 MIN: CPT | Mod: S$PBB,,, | Performed by: INTERNAL MEDICINE

## 2020-08-20 PROCEDURE — 99202 PR OFFICE/OUTPT VISIT, NEW, LEVL II, 15-29 MIN: ICD-10-PCS | Mod: S$PBB,,, | Performed by: INTERNAL MEDICINE

## 2020-08-20 PROCEDURE — 99999 PR PBB SHADOW E&M-EST. PATIENT-LVL IV: ICD-10-PCS | Mod: PBBFAC,,, | Performed by: INTERNAL MEDICINE

## 2020-08-20 PROCEDURE — 99999 PR PBB SHADOW E&M-EST. PATIENT-LVL V: CPT | Mod: PBBFAC,,, | Performed by: INTERNAL MEDICINE

## 2020-08-20 PROCEDURE — 99215 OFFICE O/P EST HI 40 MIN: CPT | Mod: PBBFAC | Performed by: INTERNAL MEDICINE

## 2020-08-20 PROCEDURE — 99215 PR OFFICE/OUTPT VISIT, EST, LEVL V, 40-54 MIN: ICD-10-PCS | Mod: S$PBB,,, | Performed by: INTERNAL MEDICINE

## 2020-08-20 PROCEDURE — 99999 PR PBB SHADOW E&M-EST. PATIENT-LVL IV: CPT | Mod: PBBFAC,,, | Performed by: INTERNAL MEDICINE

## 2020-08-20 PROCEDURE — 99202 OFFICE O/P NEW SF 15 MIN: CPT | Mod: S$PBB,,, | Performed by: INTERNAL MEDICINE

## 2020-08-20 RX ORDER — METHYLPREDNISOLONE 4 MG/1
TABLET ORAL
Qty: 1 PACKAGE | Refills: 0 | Status: SHIPPED | OUTPATIENT
Start: 2020-08-20 | End: 2020-11-05

## 2020-08-20 RX ORDER — ASPIRIN 81 MG/1
81 TABLET ORAL DAILY
Qty: 90 TABLET | Refills: 3 | Status: SHIPPED | OUTPATIENT
Start: 2020-08-20 | End: 2022-03-31

## 2020-08-20 RX ORDER — TIZANIDINE 2 MG/1
2 TABLET ORAL EVERY 6 HOURS PRN
Qty: 30 TABLET | Refills: 2 | Status: SHIPPED | OUTPATIENT
Start: 2020-08-20 | End: 2020-08-30

## 2020-08-20 NOTE — LETTER
August 20, 2020      Shavon Price MD  [address]  [address]  [facility] [address]           [id number]  [address]  [facility] [address]  Phone: [phone]          Patient: Rosa Lau   MR Number: [id number]   YOB: 1970   Date of Visit: 8/20/2020       Dear Dr. Shavon Price:    Thank you for referring Rosa Lau to me for evaluation. Attached you will find relevant portions of my assessment and plan of care.    If you have questions, please do not hesitate to call me. I look forward to following Rosa Lau along with you.    Sincerely,    Nida Flores MD    Enclosure  CC:  No Recipients    If you would like to receive this communication electronically, please contact [email or web address] or [phone] to request more information on [employer] Link access.    For providers and/or their staff who would like to refer a patient to [facility], please contact us through our one-stop-shop provider referral line, [facility], at [phone].    If you feel you have received this communication in error or would no longer like to receive these types of communications, please e-mail [email or web address]
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
## 2020-08-20 NOTE — PATIENT INSTRUCTIONS
1)Antihistamines(Allegra, Claritin, Xzyal, Zyrtec)  2)Nasal Steroids (Nasocort, Rhinocort, Flonase)  3)Distilled salt water sinus rinses via neti pots or products such as [facility] Med Sinus Rinse or Sinugator. Must wash container or device and use bottled water to avoid introducing infection.   You can can use (as directed) any combination of these three things every day of your life if needed in order to treat or control your symptoms. Brand name use of medications is not necessary

## 2020-08-21 ENCOUNTER — TELEPHONE (OUTPATIENT)
Dept: SLEEP MEDICINE | Facility: OTHER | Age: 50
End: 2020-08-21

## 2020-08-24 ENCOUNTER — TELEPHONE (OUTPATIENT)
Dept: SLEEP MEDICINE | Facility: OTHER | Age: 50
End: 2020-08-24

## 2020-09-01 NOTE — TELEPHONE ENCOUNTER
Called [facility] regarding test results. Reviewed that all JAK2 testing was negative. Agree with sleep study for HEATHER. If that is negative, then she may need additional evaluation; however, I think it is most likely that HEATHER is the cause of her polycythemia.    Sal Camacho MD

## 2020-09-08 ENCOUNTER — TELEPHONE (OUTPATIENT)
Dept: PODIATRY | Facility: CLINIC | Age: 50
End: 2020-09-08

## 2020-09-08 NOTE — TELEPHONE ENCOUNTER
Spoke wit patient, patient will schedule via [facility].    ----- Message from Efrem Clancy DPM sent at 9/8/2020  9:23 AM CDT -----  If you're still worried about it, I should see it in the office - please help her get in to see me.    Efrem  ----- Message -----  From: Solange Lopez MA  Sent: 9/8/2020   8:49 AM CDT  To: Efrem Clancy DPM      Hello Dr. Clancy     I am a little confused about the length and process for caring for my big toe in which the nail was removed. I saw you two weeks after the nail was removed and have been caring for it using the following steps     1. Putting 2 drops of the antibiotic eyedrops-letting that dry  2.  putting Neosporin on the nail bed  3.  covering the toe bed with a Mepilex Border Lite bandage   4.  Repeating the process once a day  5.  Not letting it get wet     How long should I be doing this daily? How do I know if it is healing properly? Is there a point when I should wash the accumulated Neosporin off?     Thank you  NUVIA Lau

## 2020-09-09 ENCOUNTER — OFFICE VISIT (OUTPATIENT)
Dept: PODIATRY | Facility: CLINIC | Age: 50
End: 2020-09-09
Payer: MEDICARE

## 2020-09-09 ENCOUNTER — PATIENT OUTREACH (OUTPATIENT)
Dept: ADMINISTRATIVE | Facility: OTHER | Age: 50
End: 2020-09-09

## 2020-09-09 VITALS
HEIGHT: 59 IN | SYSTOLIC BLOOD PRESSURE: 129 MMHG | TEMPERATURE: 98 F | DIASTOLIC BLOOD PRESSURE: 76 MMHG | BODY MASS INDEX: 38.71 KG/M2 | WEIGHT: 192 LBS | HEART RATE: 115 BPM

## 2020-09-09 DIAGNOSIS — M79.674 TOE PAIN, RIGHT: Primary | ICD-10-CM

## 2020-09-09 PROCEDURE — 99999 PR PBB SHADOW E&M-EST. PATIENT-LVL IV: CPT | Mod: PBBFAC,,, | Performed by: PODIATRIST

## 2020-09-09 PROCEDURE — 99214 OFFICE O/P EST MOD 30 MIN: CPT | Mod: PBBFAC,PN | Performed by: PODIATRIST

## 2020-09-09 PROCEDURE — 99212 OFFICE O/P EST SF 10 MIN: CPT | Mod: S$PBB,,, | Performed by: PODIATRIST

## 2020-09-09 PROCEDURE — 99212 PR OFFICE/OUTPT VISIT, EST, LEVL II, 10-19 MIN: ICD-10-PCS | Mod: S$PBB,,, | Performed by: PODIATRIST

## 2020-09-09 PROCEDURE — 99999 PR PBB SHADOW E&M-EST. PATIENT-LVL IV: ICD-10-PCS | Mod: PBBFAC,,, | Performed by: PODIATRIST

## 2020-09-09 NOTE — PROGRESS NOTES
Health Maintenance Due   Topic Date Due    HIV Screening  03/23/1985    Pneumococcal Vaccine (Medium Risk) (1 of 1 - PPSV23) 03/23/1991    Influenza Vaccine (1) 08/01/2020     Updates were requested from care everywhere.  Chart was reviewed for overdue Proactive [facility] Encounters ([facility]) topics (CRS, Breast Cancer Screening, Eye exam)  Health Maintenance has been updated.  LINKS immunization registry triggered.  Immunizations were reconciled.

## 2020-09-09 NOTE — PROGRESS NOTES
Subjective:      Patient ID: Rosa Lau is a 50 y.o. female.    Chief Complaint: Follow-up (Right great toenail removed)    Pain right hallux nailbed. Slowly improving since matrixecomy several weeks ago.  Still using topical products with  Thick build up and mild tenderness.  Denies trauma, signs infection.    Review of Systems   Constitution: Negative for chills, decreased appetite, diaphoresis, fever, malaise/fatigue, night sweats, weight gain and weight loss.   Skin: Positive for nail changes.           Objective:      Physical Exam  Constitutional:       General: She is not in acute distress.     Appearance: She is well-developed. She is not diaphoretic.   Cardiovascular:      Pulses:           Popliteal pulses are 2+ on the right side and 2+ on the left side.        Dorsalis pedis pulses are 2+ on the right side and 2+ on the left side.        Posterior tibial pulses are 2+ on the right side and 2+ on the left side.      Comments: Capillary refill 3 seconds all toes/distal feet, all toes/both feet warm to touch.      Negative lymphadenopathy bilateral popliteal fossa and tarsal tunnel.      Negavie lower extremity edema bilateral.    Musculoskeletal:      Right ankle: She exhibits normal range of motion, no swelling, no ecchymosis, no deformity, no laceration and normal pulse. Achilles tendon normal. Achilles tendon exhibits no pain, no defect and normal Saez's test results.   Lymphadenopathy:      Lower Body: No right inguinal adenopathy. No left inguinal adenopathy.      Comments: Negative lymphadenopathy bilateral popliteal fossa and tarsal tunnel.    Negative lymphangitic streaking bilateral feet/ankles/legs.   Skin:     General: Skin is warm and dry.      Capillary Refill: Capillary refill takes 2 to 3 seconds.      Coloration: Skin is not pale.      Findings: No abrasion, bruising, burn, ecchymosis, erythema, laceration, lesion or rash.      Nails: There is no clubbing.        Comments: Wound  right hallux nailbed closed today, epithelialized  without ulceration, drainage, pus, tracking, fluctuance, malodor, or cardinal signs infection.    Otherwise, Skin is normal age and health appropriate color, turgor, texture, and temperature bilateral lower extremities without ulceration, hyperpigmentation, discoloration, masses nodules or cords palpated.  No ecchymosis, erythema, edema, or cardinal signs of infection bilateral lower extremities.     Neurological:      Mental Status: She is alert and oriented to person, place, and time.      Sensory: No sensory deficit.      Motor: No tremor, atrophy or abnormal muscle tone.      Gait: Gait normal.      Deep Tendon Reflexes:      Reflex Scores:       Patellar reflexes are 2+ on the right side and 2+ on the left side.       Achilles reflexes are 2+ on the right side and 2+ on the left side.     Comments: Negative tinel sign to percussion sural, superficial peroneal, deep peroneal, saphenous, and posterior tibial nerves right and left ankles and feet.     Psychiatric:         Behavior: Behavior is cooperative.               Assessment:       No diagnosis found.      Plan:       There are no diagnoses linked to this encounter.  I counseled the patient on her conditions, their implications and medical management.        Inspect feet multiple times daily for signs of occurrence/recurrence ulceration.    Discussed conservative treatment with shoes of adequate dimensions, material, and style to alleviate symptoms and delay or prevent surgical intervention.    Cleared for all activity, to tolerance in shoes of choice.            No follow-ups on file.

## 2020-09-28 ENCOUNTER — HOSPITAL ENCOUNTER (OUTPATIENT)
Dept: SLEEP MEDICINE | Facility: OTHER | Age: 50
Discharge: HOME | End: 2020-09-28
Attending: INTERNAL MEDICINE
Payer: MEDICARE

## 2020-09-28 DIAGNOSIS — E66.9 OBESITY (BMI 35.0-39.9 WITHOUT COMORBIDITY): ICD-10-CM

## 2020-09-28 DIAGNOSIS — D75.1 POLYCYTHEMIA: ICD-10-CM

## 2020-09-28 DIAGNOSIS — R06.83 SNORING: ICD-10-CM

## 2020-09-28 DIAGNOSIS — G47.30 SLEEP APNEA IN ADULT: ICD-10-CM

## 2020-09-28 DIAGNOSIS — G47.33 OSA (OBSTRUCTIVE SLEEP APNEA): Primary | ICD-10-CM

## 2020-09-28 DIAGNOSIS — J45.30 MILD PERSISTENT ASTHMA, UNCOMPLICATED: ICD-10-CM

## 2020-09-28 DIAGNOSIS — F31.9 BIPOLAR 1 DISORDER: ICD-10-CM

## 2020-09-28 DIAGNOSIS — F41.1 GAD (GENERALIZED ANXIETY DISORDER): ICD-10-CM

## 2020-09-28 DIAGNOSIS — I10 ESSENTIAL HYPERTENSION: ICD-10-CM

## 2020-09-28 PROCEDURE — 95810 POLYSOM 6/> YRS 4/> PARAM: CPT | Mod: 26,,, | Performed by: INTERNAL MEDICINE

## 2020-09-28 PROCEDURE — 95810 POLYSOM 6/> YRS 4/> PARAM: CPT

## 2020-09-28 PROCEDURE — 95810 PR POLYSOMNOGRAPHY, 4 OR MORE: ICD-10-PCS | Mod: 26,,, | Performed by: INTERNAL MEDICINE

## 2020-09-29 NOTE — PROGRESS NOTES
A PSG was preformed on Rosa Lau on the night of 9/28/2020. The proceudre was explained to the patient. All questions were asked and answered prior to the start of the study. The patient did not meet split night criteria set by the doctor.     SDB events appeared to be noted. Snoring was noted to be mild to moderate.    The EKG seemed to have shown NSR. The lowest spo2 noted was 87%.    Supine sleep was noted. The patient did have a hard time falling back to for the second half of the study.    Disposable equipment was used where applicable. An end of the night instruction sheet was giving to the patient upon leaving the lab.

## 2020-10-01 DIAGNOSIS — G47.33 OSA (OBSTRUCTIVE SLEEP APNEA): Primary | ICD-10-CM

## 2020-10-01 NOTE — PROCEDURES
Patient Name: [facility] #: [id number]   Sex: Female Study Date: 2020   : 1970 Clinic #: [id number]   Age: 50 Referring Physician: Nida Flores   Height: 59.0 in Referring Physician #    Weight: 192.0 lbs Sleep Specialist:    DOMINGAI.: 38.8 Sleep Specialist #    Hypopnea rule: AASM 1B Scoring Tech: Avis,RPSGT   Total AHI: 22.3 Recording Tech: ALLY ANTHONY RRT, RPSGT   Lowest O2 sat: 87.0% Recording Location: [facility]     Sleep architecture: This is a baseline polysomnogram. At lights out, the patient fell asleep in 20.5 minutes and slept for 53.0% of the time. Total sleep time (TST) was 239.0 minutes. 13.4% of TST was in Stage N1 sleep, 57.9% TST in slow wave sleep, and 0.0% TST in REM sleep. The REM latency was N/A minutes.     Respiratory: Snoring was present. There was moderate HEATHER (obstructive sleep apnea) based on AHI (apnea hypopnea index) criteria. The overall AHI was 22.3 with an oxygen ngozi of 87.0%.  The supine AHI was 57.3 and the REM AHI was N/A. T    Motor movement / Parasomnia: There were no significant limb movements of sleep noted.     Cardiac: Cardiac rhythm monitoring revealed a normal sinus rhythm.      IMPRESSION:  1. Moderate HEATHER (327.23) based on AHI criteria    RECOMMENDATION:  1. CPAP is recommended therapy.  2. Patient has follow up with Sleep Medicine.

## 2020-11-04 ENCOUNTER — PATIENT OUTREACH (OUTPATIENT)
Dept: ADMINISTRATIVE | Facility: OTHER | Age: 50
End: 2020-11-04

## 2020-11-05 ENCOUNTER — OFFICE VISIT (OUTPATIENT)
Dept: OBSTETRICS AND GYNECOLOGY | Facility: CLINIC | Age: 50
End: 2020-11-05
Attending: OBSTETRICS & GYNECOLOGY
Payer: MEDICARE

## 2020-11-05 VITALS — WEIGHT: 194 LBS | BODY MASS INDEX: 39.11 KG/M2 | HEIGHT: 59 IN

## 2020-11-05 DIAGNOSIS — Z01.419 ENCOUNTER FOR GYNECOLOGICAL EXAMINATION (GENERAL) (ROUTINE) WITHOUT ABNORMAL FINDINGS: ICD-10-CM

## 2020-11-05 DIAGNOSIS — Z12.31 VISIT FOR SCREENING MAMMOGRAM: Primary | ICD-10-CM

## 2020-11-05 PROCEDURE — G0101 CA SCREEN;PELVIC/BREAST EXAM: HCPCS | Mod: PBBFAC

## 2020-11-05 PROCEDURE — G0101 PR CA SCREEN;PELVIC/BREAST EXAM: ICD-10-PCS | Mod: S$PBB,,, | Performed by: OBSTETRICS & GYNECOLOGY

## 2020-11-05 PROCEDURE — 99999 PR PBB SHADOW E&M-EST. PATIENT-LVL IV: CPT | Mod: PBBFAC,,, | Performed by: OBSTETRICS & GYNECOLOGY

## 2020-11-05 PROCEDURE — 99999 PR PBB SHADOW E&M-EST. PATIENT-LVL IV: ICD-10-PCS | Mod: PBBFAC,,, | Performed by: OBSTETRICS & GYNECOLOGY

## 2020-11-05 PROCEDURE — 99214 OFFICE O/P EST MOD 30 MIN: CPT | Mod: PBBFAC,25 | Performed by: OBSTETRICS & GYNECOLOGY

## 2020-11-05 PROCEDURE — G0101 CA SCREEN;PELVIC/BREAST EXAM: HCPCS | Mod: S$PBB,,, | Performed by: OBSTETRICS & GYNECOLOGY

## 2020-11-05 RX ORDER — ASENAPINE MALEATE 10 MG/1
15 TABLET SUBLINGUAL
COMMUNITY
Start: 2020-10-23 | End: 2021-07-16
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
## 2020-11-06 ENCOUNTER — HOSPITAL ENCOUNTER (OUTPATIENT)
Dept: RADIOLOGY | Facility: OTHER | Age: 50
Discharge: HOME | End: 2020-11-06
Attending: OBSTETRICS & GYNECOLOGY
Payer: MEDICARE

## 2020-11-06 DIAGNOSIS — Z12.31 VISIT FOR SCREENING MAMMOGRAM: ICD-10-CM

## 2020-11-06 PROCEDURE — 77067 SCR MAMMO BI INCL CAD: CPT | Mod: TC

## 2020-11-06 PROCEDURE — 77063 BREAST TOMOSYNTHESIS BI: CPT | Mod: 26,,, | Performed by: INTERNAL MEDICINE

## 2020-11-06 PROCEDURE — 77067 SCR MAMMO BI INCL CAD: CPT | Mod: 26,,, | Performed by: INTERNAL MEDICINE

## 2020-11-06 PROCEDURE — 77067 MAMMO DIGITAL SCREENING BILAT WITH TOMO: ICD-10-PCS | Mod: 26,,, | Performed by: INTERNAL MEDICINE

## 2020-11-06 PROCEDURE — 77063 MAMMO DIGITAL SCREENING BILAT WITH TOMO: ICD-10-PCS | Mod: 26,,, | Performed by: INTERNAL MEDICINE

## 2020-11-20 ENCOUNTER — OFFICE VISIT (OUTPATIENT)
Dept: INTERNAL MEDICINE | Facility: CLINIC | Age: 50
End: 2020-11-20
Payer: MEDICARE

## 2020-11-20 VITALS
DIASTOLIC BLOOD PRESSURE: 88 MMHG | HEIGHT: 59 IN | OXYGEN SATURATION: 98 % | BODY MASS INDEX: 38.93 KG/M2 | HEART RATE: 84 BPM | SYSTOLIC BLOOD PRESSURE: 124 MMHG | WEIGHT: 193.13 LBS

## 2020-11-20 DIAGNOSIS — Z86.010 HISTORY OF COLONIC POLYPS: ICD-10-CM

## 2020-11-20 DIAGNOSIS — G47.33 OSA (OBSTRUCTIVE SLEEP APNEA): ICD-10-CM

## 2020-11-20 DIAGNOSIS — F31.9 BIPOLAR 1 DISORDER: ICD-10-CM

## 2020-11-20 DIAGNOSIS — E66.9 OBESITY (BMI 35.0-39.9 WITHOUT COMORBIDITY): ICD-10-CM

## 2020-11-20 DIAGNOSIS — Z91.89 INCREASED RISK OF BREAST CANCER: ICD-10-CM

## 2020-11-20 DIAGNOSIS — K21.9 GASTROESOPHAGEAL REFLUX DISEASE, UNSPECIFIED WHETHER ESOPHAGITIS PRESENT: ICD-10-CM

## 2020-11-20 DIAGNOSIS — G44.219 EPISODIC TENSION-TYPE HEADACHE, NOT INTRACTABLE: Primary | ICD-10-CM

## 2020-11-20 PROCEDURE — 99999 PR PBB SHADOW E&M-EST. PATIENT-LVL V: CPT | Mod: PBBFAC,,, | Performed by: INTERNAL MEDICINE

## 2020-11-20 PROCEDURE — 99215 PR OFFICE/OUTPT VISIT, EST, LEVL V, 40-54 MIN: ICD-10-PCS | Mod: S$PBB,,, | Performed by: INTERNAL MEDICINE

## 2020-11-20 PROCEDURE — 99999 PR PBB SHADOW E&M-EST. PATIENT-LVL V: ICD-10-PCS | Mod: PBBFAC,,, | Performed by: INTERNAL MEDICINE

## 2020-11-20 PROCEDURE — 99215 OFFICE O/P EST HI 40 MIN: CPT | Mod: PBBFAC | Performed by: INTERNAL MEDICINE

## 2020-11-20 PROCEDURE — 99215 OFFICE O/P EST HI 40 MIN: CPT | Mod: S$PBB,,, | Performed by: INTERNAL MEDICINE

## 2020-11-20 NOTE — PROGRESS NOTES
Subjective:       Patient ID: Rosa Lau is a 50 y.o. female who  has a past medical history of Bipolar disorder, Hyperlipidemia, Hypertension, and Macular degeneration of both eyes.    Chief Complaint: Depression, Establish Care, and Headache     History was obtained from the patient and supplemented through chart review   -Saw sleep clinic for AM headaches, HEATHER.      HPI    HA:   Had severe gradual onset HA.  Wakes up with it and worsens throughout the day. HA is also worse when walking, putting away groceries. It is not worsened by recumbency.  Previously relieved with OTC Advil.  Tried Excedrine, ibuprofen.  Has had stress from health issues lately, but this has decreased.  No rhinorrhea, but does have postnasal drip.  Takes Allegra daily.    Headache resolved with Zanaflex, Medrol Dosepak.  Also started CPAP for HEATHER.    HEATHER:  Could not tolerate CPAP in the past.  HA as above.  Repeat PSG confirmed moderate HEATHER.  Now has CPAP.  Switched her mask with improvement.    Elevated TC score:  MMG 11/2020. GM with breast ca.    Obesity:  BMI 39.    Exercise:  3/week on stationary bike.  Diet:  Stress eating. Watching calories.    GERD:  No EGD before.  Used to take PPI daily, but now able to take Protonix PRN with improvement.    History of colon polyp:  No cscope in our records.  Unsure when it was or when to repeat.  Cscope was likely >5 years.     Bipolar disorder, LIA:  Diagnosed at age 20. Had mild cognitive impairment through neuropsych testing.  Follows with [facility] (Dr. Selena Weinstein) and therapy. Stopped Vraylar. On duloxetine, propranolol, clonazepam. Started Saphris x 1 month; per Psych, needs baseline labs and monitor LFTs, FLPs.                 Not addressed today.  HTN:    Pt's BP is controlled on HCTZ 12.5. Tolerating meds well.    Controlled. Cont HCTZ 12.5.    HLD:  Is currently taking fenofibrate   Controlled.  Continue fenofibrate.  Lab Results   Component Value Date    LDLCALC 116.8  07/23/2020     The 10-year ASCVD risk score (Paramjitfranc CARTER Jr., et al., 2013) is: 2.1%    Values used to calculate the score:      Age: 50 years      Sex: Female      Is Non- : No      Diabetic: No      Tobacco smoker: No      Systolic Blood Pressure: 124 mmHg      Is BP treated: Yes      HDL Cholesterol: 33 mg/dL      Total Cholesterol: 169 mg/dL    Polycythemia:  No tobacco use, dehydration.  No family history of polycythemia, but MGKEYLA had early stroke at age 33.  Following with Heme-Onc.  Thought to be secondary due to elevated EPO.  JAK2, MPN panel neg. Tachycardia was noted during visit and she had SOB.  D-dimer positive, but CT angio without PE.  Recommended sleep study and discontinue OCPs.  Has referral to cardiology. Repeat PSG confirmed HEATHER.  Well followed by hematology.  Added ASA 81. HEATHER contributing. Cont CPAP.    ELY:  SOB after walking short distances and carrying groceries inside.  CT angio without PE as above.    Asthma:  Uses her rescue inhaler about twice a week and symbicort daily.  Takes Allegra.    AR:  Likely contributing to HA as above.  Continue antihistamine.  No boggy turbinates on exam.    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  She follows with optho. Takes eye supplements (AREDS, fish oil, B complex, CoQ 10, carnitine). Goes to [facility]. Vision progressively worsening.     Poor memory:  Will see Neuro.    Review of Systems   Constitutional: Negative for fever and unexpected weight change.   HENT: Negative for rhinorrhea and sneezing.    Eyes: Negative for redness and itching.   Respiratory: Negative for shortness of breath and wheezing.    Cardiovascular: Negative for chest pain and leg swelling.   Gastrointestinal: Negative for abdominal pain and vomiting.   Genitourinary: Negative for dysuria and hematuria.   Musculoskeletal: Negative for gait problem and joint swelling.   Skin:  "Negative for color change and rash.   Neurological: Positive for headaches. Negative for dizziness.   Hematological: Negative for adenopathy.   Psychiatric/Behavioral: Positive for dysphoric mood. Negative for confusion.       I personally reviewed Past Medical History, Past Surgical History, Social History, and Family History.    Objective:      Vitals:    11/20/20 0831   BP: 124/88   Pulse: 84   SpO2: 98%   Weight: 87.6 kg (193 lb 2 oz)   Height: 4' 11" (1.499 m)      Physical Exam  Constitutional:       General: She is not in acute distress.     Appearance: She is well-developed. She is not diaphoretic.   HENT:      Head: Normocephalic and atraumatic.      Nose: Nose normal.      Mouth/Throat:      Pharynx: No oropharyngeal exudate.      Comments: Mallampati 3  Eyes:      General: No scleral icterus.        Right eye: No discharge.         Left eye: No discharge.   Neck:      Musculoskeletal: Neck supple.      Thyroid: No thyromegaly.      Trachea: No tracheal deviation.   Cardiovascular:      Rate and Rhythm: Normal rate and regular rhythm.      Heart sounds: Normal heart sounds. No murmur.   Pulmonary:      Effort: Pulmonary effort is normal. No respiratory distress.      Breath sounds: Normal breath sounds. No wheezing.   Abdominal:      General: Bowel sounds are normal. There is no distension.      Palpations: Abdomen is soft.      Tenderness: There is no abdominal tenderness.   Musculoskeletal:         General: No deformity.      Right lower leg: No edema.      Left lower leg: No edema.   Lymphadenopathy:      Cervical: No cervical adenopathy.   Skin:     General: Skin is warm and dry.      Findings: No erythema.   Neurological:      Mental Status: She is alert.      Cranial Nerves: No cranial nerve deficit.      Gait: Gait normal.   Psychiatric:         Behavior: Behavior normal.           Lab Results   Component Value Date    WBC 6.55 08/11/2020    HGB 15.9 08/11/2020    HCT 48.1 08/11/2020     " 08/11/2020    CHOL 169 07/23/2020    TRIG 96 07/23/2020    HDL 33 (L) 07/23/2020    ALT 15 07/23/2020    AST 15 07/23/2020     07/23/2020    K 4.1 07/23/2020     07/23/2020    CREATININE 1.0 07/23/2020    BUN 15 07/23/2020    CO2 25 07/23/2020    TSH 3.260 07/23/2020    HGBA1C 5.0 07/23/2020       The 10-year ASCVD risk score (Paramjit CARTER Jr., et al., 2013) is: 2.1%    Values used to calculate the score:      Age: 50 years      Sex: Female      Is Non- : No      Diabetic: No      Tobacco smoker: No      Systolic Blood Pressure: 124 mmHg      Is BP treated: Yes      HDL Cholesterol: 33 mg/dL      Total Cholesterol: 169 mg/dL    (Imaging have been independently reviewed)  11/2020 Mammogram without evidence of malignancy, BI-RADS 1, TC score high.    Assessment:       1. Episodic tension-type headache, not intractable    2. HEATHER (obstructive sleep apnea)    3. Increased risk of breast cancer    4. Obesity (BMI 35.0-39.9 without comorbidity)    5. Gastroesophageal reflux disease, unspecified whether esophagitis present    6. History of colonic polyps    7. Bipolar 1 disorder          Plan:       Rosa was seen today for depression, establish care and headache.    Diagnoses and all orders for this visit:    Episodic tension-type headache, not intractable  Comments:  Improved. AR, HEATHER, neck pain contributing. Cont CPAP. Antihisamine, muscle relaxant PRN, neck stretches.    HEATHER (obstructive sleep apnea)  Comments:  Improved. Likely contributing to HA as above. F/u sleep clinic. Cont CPAP.    Increased risk of breast cancer  Comments:  New. Refer to breast clinic to discuss options.  Orders:  -     Ambulatory referral/consult to Breast Surgery; Future    Obesity (BMI 35.0-39.9 without comorbidity)  Comments:  Stable. Encouraged moderate intensity exercise, dietary changes.    Gastroesophageal reflux disease, unspecified whether esophagitis present  Comments:  Doing well. Discussed avoiding  fatty, large meals, acidic foods. Cont PPI PRN.     History of colonic polyps  Comments:  New to me. Unsure last cscope, but likely >5 years ago. Due for repeat cscope. Refer to [facility].  Orders:  -     Ambulatory referral/consult to Gastroenterology; Future    Bipolar 1 disorder  Comments:  Stable. Need to monitor FLP, LFTs on Saphris. Recently had labs. She will discuss with OSH Psych labs for monitoring.         Side effects of medication(s) were discussed in detail and patient voiced understanding.  Patient will call back for any issues or complications.     RTC in 6 month(s) or sooner PRN for HTN.

## 2020-12-01 ENCOUNTER — PATIENT MESSAGE (OUTPATIENT)
Dept: HEMATOLOGY/ONCOLOGY | Facility: CLINIC | Age: 50
End: 2020-12-01

## 2020-12-05 ENCOUNTER — PATIENT OUTREACH (OUTPATIENT)
Dept: ADMINISTRATIVE | Facility: OTHER | Age: 50
End: 2020-12-05

## 2020-12-05 NOTE — PROGRESS NOTES
Health Maintenance Due   Topic Date Due    HIV Screening  03/23/1985    Pneumococcal Vaccine (Medium Risk) (1 of 1 - PPSV23) 03/23/1991     Updates were requested from care everywhere.  Chart was reviewed for overdue Proactive [facility] Encounters ([facility]) topics (CRS, Breast Cancer Screening, Eye exam)  Health Maintenance has been updated.  [facility] immunization registry triggered.  Immunizations were reconciled.

## 2020-12-10 ENCOUNTER — OFFICE VISIT (OUTPATIENT)
Dept: SLEEP MEDICINE | Facility: CLINIC | Age: 50
End: 2020-12-10
Payer: MEDICARE

## 2020-12-10 VITALS
WEIGHT: 196 LBS | HEIGHT: 59 IN | BODY MASS INDEX: 39.51 KG/M2 | HEART RATE: 74 BPM | SYSTOLIC BLOOD PRESSURE: 113 MMHG | DIASTOLIC BLOOD PRESSURE: 73 MMHG

## 2020-12-10 DIAGNOSIS — D75.1 POLYCYTHEMIA: ICD-10-CM

## 2020-12-10 DIAGNOSIS — E66.9 OBESITY (BMI 35.0-39.9 WITHOUT COMORBIDITY): ICD-10-CM

## 2020-12-10 DIAGNOSIS — G47.33 OSA (OBSTRUCTIVE SLEEP APNEA): Primary | ICD-10-CM

## 2020-12-10 DIAGNOSIS — F41.1 GAD (GENERALIZED ANXIETY DISORDER): ICD-10-CM

## 2020-12-10 DIAGNOSIS — J45.30 MILD PERSISTENT ASTHMA, UNCOMPLICATED: ICD-10-CM

## 2020-12-10 DIAGNOSIS — I10 ESSENTIAL HYPERTENSION: ICD-10-CM

## 2020-12-10 PROCEDURE — 99999 PR PBB SHADOW E&M-EST. PATIENT-LVL III: CPT | Mod: PBBFAC,,, | Performed by: INTERNAL MEDICINE

## 2020-12-10 PROCEDURE — 99213 OFFICE O/P EST LOW 20 MIN: CPT | Mod: S$PBB,,, | Performed by: INTERNAL MEDICINE

## 2020-12-10 PROCEDURE — 99213 OFFICE O/P EST LOW 20 MIN: CPT | Mod: PBBFAC | Performed by: INTERNAL MEDICINE

## 2020-12-10 PROCEDURE — 99999 PR PBB SHADOW E&M-EST. PATIENT-LVL III: ICD-10-PCS | Mod: PBBFAC,,, | Performed by: INTERNAL MEDICINE

## 2020-12-10 PROCEDURE — 99213 PR OFFICE/OUTPT VISIT, EST, LEVL III, 20-29 MIN: ICD-10-PCS | Mod: S$PBB,,, | Performed by: INTERNAL MEDICINE
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
## 2020-12-15 ENCOUNTER — PATIENT OUTREACH (OUTPATIENT)
Dept: ADMINISTRATIVE | Facility: HOSPITAL | Age: 50
End: 2020-12-15

## 2021-02-22 ENCOUNTER — PATIENT OUTREACH (OUTPATIENT)
Dept: ADMINISTRATIVE | Facility: OTHER | Age: 51
End: 2021-02-22

## 2021-02-22 ENCOUNTER — PATIENT MESSAGE (OUTPATIENT)
Dept: NEUROLOGY | Facility: CLINIC | Age: 51
End: 2021-02-22

## 2021-02-26 ENCOUNTER — TELEPHONE (OUTPATIENT)
Dept: NEUROLOGY | Facility: CLINIC | Age: 51
End: 2021-02-26

## 2021-02-26 ENCOUNTER — OFFICE VISIT (OUTPATIENT)
Dept: NEUROLOGY | Facility: CLINIC | Age: 51
End: 2021-02-26
Payer: MEDICARE

## 2021-02-26 VITALS
HEIGHT: 59 IN | BODY MASS INDEX: 39.59 KG/M2 | SYSTOLIC BLOOD PRESSURE: 130 MMHG | HEART RATE: 91 BPM | DIASTOLIC BLOOD PRESSURE: 81 MMHG

## 2021-02-26 DIAGNOSIS — R41.3 MEMORY LOSS: ICD-10-CM

## 2021-02-26 DIAGNOSIS — R41.3 OTHER AMNESIA: ICD-10-CM

## 2021-02-26 PROCEDURE — 99213 OFFICE O/P EST LOW 20 MIN: CPT | Mod: PBBFAC | Performed by: PSYCHIATRY & NEUROLOGY

## 2021-02-26 PROCEDURE — 99999 PR PBB SHADOW E&M-EST. PATIENT-LVL III: ICD-10-PCS | Mod: PBBFAC,,, | Performed by: PSYCHIATRY & NEUROLOGY

## 2021-02-26 PROCEDURE — 99214 PR OFFICE/OUTPT VISIT, EST, LEVL IV, 30-39 MIN: ICD-10-PCS | Mod: S$PBB,,, | Performed by: PSYCHIATRY & NEUROLOGY

## 2021-02-26 PROCEDURE — 99214 OFFICE O/P EST MOD 30 MIN: CPT | Mod: S$PBB,,, | Performed by: PSYCHIATRY & NEUROLOGY

## 2021-02-26 PROCEDURE — 99999 PR PBB SHADOW E&M-EST. PATIENT-LVL III: CPT | Mod: PBBFAC,,, | Performed by: PSYCHIATRY & NEUROLOGY

## 2021-03-02 ENCOUNTER — TELEPHONE (OUTPATIENT)
Dept: NEUROLOGY | Facility: CLINIC | Age: 51
End: 2021-03-02

## 2021-03-11 ENCOUNTER — HOSPITAL ENCOUNTER (OUTPATIENT)
Dept: RADIOLOGY | Facility: HOSPITAL | Age: 51
Discharge: HOME | End: 2021-03-11
Attending: PSYCHIATRY & NEUROLOGY
Payer: MEDICARE

## 2021-03-11 DIAGNOSIS — R41.3 OTHER AMNESIA: ICD-10-CM

## 2021-03-11 PROCEDURE — 70551 MRI BRAIN STEM W/O DYE: CPT | Mod: 26,,, | Performed by: RADIOLOGY

## 2021-03-11 PROCEDURE — 70551 MRI BRAIN STEM W/O DYE: CPT | Mod: TC

## 2021-03-11 PROCEDURE — 70551 MRI BRAIN WITHOUT CONTRAST: ICD-10-PCS | Mod: 26,,, | Performed by: RADIOLOGY

## 2021-03-29 ENCOUNTER — PATIENT MESSAGE (OUTPATIENT)
Dept: INTERNAL MEDICINE | Facility: CLINIC | Age: 51
End: 2021-03-29

## 2021-07-16 ENCOUNTER — LAB VISIT (OUTPATIENT)
Dept: LAB | Facility: OTHER | Age: 51
End: 2021-07-16
Attending: INTERNAL MEDICINE
Payer: MEDICARE

## 2021-07-16 ENCOUNTER — OFFICE VISIT (OUTPATIENT)
Dept: INTERNAL MEDICINE | Facility: CLINIC | Age: 51
End: 2021-07-16
Payer: MEDICARE

## 2021-07-16 VITALS
BODY MASS INDEX: 38.13 KG/M2 | SYSTOLIC BLOOD PRESSURE: 129 MMHG | HEART RATE: 72 BPM | DIASTOLIC BLOOD PRESSURE: 55 MMHG | WEIGHT: 189.13 LBS | OXYGEN SATURATION: 97 % | HEIGHT: 59 IN

## 2021-07-16 DIAGNOSIS — M51.36 DDD (DEGENERATIVE DISC DISEASE), LUMBAR: Primary | ICD-10-CM

## 2021-07-16 DIAGNOSIS — Z11.4 SCREENING FOR HIV (HUMAN IMMUNODEFICIENCY VIRUS): ICD-10-CM

## 2021-07-16 DIAGNOSIS — D75.1 POLYCYTHEMIA: ICD-10-CM

## 2021-07-16 DIAGNOSIS — Z91.89 INCREASED RISK OF BREAST CANCER: ICD-10-CM

## 2021-07-16 DIAGNOSIS — Z86.010 HISTORY OF COLONIC POLYPS: ICD-10-CM

## 2021-07-16 DIAGNOSIS — I10 ESSENTIAL HYPERTENSION: ICD-10-CM

## 2021-07-16 DIAGNOSIS — E78.5 HYPERLIPIDEMIA, UNSPECIFIED HYPERLIPIDEMIA TYPE: ICD-10-CM

## 2021-07-16 DIAGNOSIS — E66.9 OBESITY (BMI 35.0-39.9 WITHOUT COMORBIDITY): ICD-10-CM

## 2021-07-16 DIAGNOSIS — J45.30 MILD PERSISTENT ASTHMA, UNCOMPLICATED: ICD-10-CM

## 2021-07-16 PROBLEM — M51.369 DDD (DEGENERATIVE DISC DISEASE), LUMBAR: Status: ACTIVE | Noted: 2021-07-16

## 2021-07-16 LAB
ALBUMIN SERPL BCP-MCNC: 4 G/DL (ref 3.5–5.2)
ALP SERPL-CCNC: 39 U/L (ref 55–135)
ALT SERPL W/O P-5'-P-CCNC: 22 U/L (ref 10–44)
ANION GAP SERPL CALC-SCNC: 13 MMOL/L (ref 8–16)
AST SERPL-CCNC: 21 U/L (ref 10–40)
BASOPHILS # BLD AUTO: 0.12 K/UL (ref 0–0.2)
BASOPHILS NFR BLD: 1.3 % (ref 0–1.9)
BILIRUB SERPL-MCNC: 0.4 MG/DL (ref 0.1–1)
BUN SERPL-MCNC: 22 MG/DL (ref 6–20)
CALCIUM SERPL-MCNC: 9.4 MG/DL (ref 8.7–10.5)
CHLORIDE SERPL-SCNC: 102 MMOL/L (ref 95–110)
CHOLEST SERPL-MCNC: 150 MG/DL (ref 120–199)
CHOLEST/HDLC SERPL: 3.8 {RATIO} (ref 2–5)
CO2 SERPL-SCNC: 24 MMOL/L (ref 23–29)
CREAT SERPL-MCNC: 1.1 MG/DL (ref 0.5–1.4)
DIFFERENTIAL METHOD: ABNORMAL
EOSINOPHIL # BLD AUTO: 0.2 K/UL (ref 0–0.5)
EOSINOPHIL NFR BLD: 2.1 % (ref 0–8)
ERYTHROCYTE [DISTWIDTH] IN BLOOD BY AUTOMATED COUNT: 12.7 % (ref 11.5–14.5)
EST. GFR  (AFRICAN AMERICAN): >60 ML/MIN/1.73 M^2
EST. GFR  (NON AFRICAN AMERICAN): 59 ML/MIN/1.73 M^2
GLUCOSE SERPL-MCNC: 103 MG/DL (ref 70–110)
HCT VFR BLD AUTO: 48.7 % (ref 37–48.5)
HDLC SERPL-MCNC: 40 MG/DL (ref 40–75)
HDLC SERPL: 26.7 % (ref 20–50)
HGB BLD-MCNC: 16.3 G/DL (ref 12–16)
IMM GRANULOCYTES # BLD AUTO: 0.03 K/UL (ref 0–0.04)
IMM GRANULOCYTES NFR BLD AUTO: 0.3 % (ref 0–0.5)
LDLC SERPL CALC-MCNC: 86.8 MG/DL (ref 63–159)
LYMPHOCYTES # BLD AUTO: 2.7 K/UL (ref 1–4.8)
LYMPHOCYTES NFR BLD: 28.9 % (ref 18–48)
MCH RBC QN AUTO: 29.9 PG (ref 27–31)
MCHC RBC AUTO-ENTMCNC: 33.5 G/DL (ref 32–36)
MCV RBC AUTO: 89 FL (ref 82–98)
MONOCYTES # BLD AUTO: 0.6 K/UL (ref 0.3–1)
MONOCYTES NFR BLD: 5.9 % (ref 4–15)
NEUTROPHILS # BLD AUTO: 5.8 K/UL (ref 1.8–7.7)
NEUTROPHILS NFR BLD: 61.5 % (ref 38–73)
NONHDLC SERPL-MCNC: 110 MG/DL
NRBC BLD-RTO: 0 /100 WBC
PLATELET # BLD AUTO: 396 K/UL (ref 150–450)
PMV BLD AUTO: 9.1 FL (ref 9.2–12.9)
POTASSIUM SERPL-SCNC: 4.5 MMOL/L (ref 3.5–5.1)
PROT SERPL-MCNC: 7.5 G/DL (ref 6–8.4)
RBC # BLD AUTO: 5.46 M/UL (ref 4–5.4)
SODIUM SERPL-SCNC: 139 MMOL/L (ref 136–145)
TRIGL SERPL-MCNC: 116 MG/DL (ref 30–150)
WBC # BLD AUTO: 9.37 K/UL (ref 3.9–12.7)

## 2021-07-16 PROCEDURE — 99215 OFFICE O/P EST HI 40 MIN: CPT | Mod: S$PBB,,, | Performed by: INTERNAL MEDICINE

## 2021-07-16 PROCEDURE — 99999 PR PBB SHADOW E&M-EST. PATIENT-LVL III: ICD-10-PCS | Mod: PBBFAC,,, | Performed by: INTERNAL MEDICINE

## 2021-07-16 PROCEDURE — 80061 LIPID PANEL: CPT | Performed by: INTERNAL MEDICINE

## 2021-07-16 PROCEDURE — 85025 COMPLETE CBC W/AUTO DIFF WBC: CPT | Performed by: INTERNAL MEDICINE

## 2021-07-16 PROCEDURE — 99215 PR OFFICE/OUTPT VISIT, EST, LEVL V, 40-54 MIN: ICD-10-PCS | Mod: S$PBB,,, | Performed by: INTERNAL MEDICINE

## 2021-07-16 PROCEDURE — 99213 OFFICE O/P EST LOW 20 MIN: CPT | Mod: PBBFAC | Performed by: INTERNAL MEDICINE

## 2021-07-16 PROCEDURE — 80053 COMPREHEN METABOLIC PANEL: CPT | Performed by: INTERNAL MEDICINE

## 2021-07-16 PROCEDURE — 99999 PR PBB SHADOW E&M-EST. PATIENT-LVL III: CPT | Mod: PBBFAC,,, | Performed by: INTERNAL MEDICINE

## 2021-07-16 PROCEDURE — 36415 COLL VENOUS BLD VENIPUNCTURE: CPT | Performed by: INTERNAL MEDICINE

## 2021-07-16 PROCEDURE — 87389 HIV-1 AG W/HIV-1&-2 AB AG IA: CPT | Performed by: INTERNAL MEDICINE

## 2021-07-16 RX ORDER — FENOFIBRATE 160 MG/1
160 TABLET ORAL DAILY
Qty: 90 TABLET | Refills: 3 | Status: SHIPPED | OUTPATIENT
Start: 2021-07-16 | End: 2023-06-09 | Stop reason: SDUPTHER

## 2021-07-16 RX ORDER — HYDROCHLOROTHIAZIDE 12.5 MG/1
12.5 CAPSULE ORAL DAILY
Qty: 90 CAPSULE | Refills: 3 | Status: SHIPPED | OUTPATIENT
Start: 2021-07-16 | End: 2022-11-01 | Stop reason: SDUPTHER

## 2021-07-16 RX ORDER — ALBUTEROL SULFATE 90 UG/1
1-2 AEROSOL, METERED RESPIRATORY (INHALATION) EVERY 6 HOURS PRN
Qty: 18 G | Refills: 5 | Status: SHIPPED | OUTPATIENT
Start: 2021-07-16 | End: 2022-10-05 | Stop reason: SDUPTHER

## 2021-07-17 LAB — HIV 1+2 AB+HIV1 P24 AG SERPL QL IA: NEGATIVE

## 2021-07-19 ENCOUNTER — PATIENT MESSAGE (OUTPATIENT)
Dept: INTERNAL MEDICINE | Facility: CLINIC | Age: 51
End: 2021-07-19

## 2021-07-19 ENCOUNTER — PATIENT OUTREACH (OUTPATIENT)
Dept: ADMINISTRATIVE | Facility: HOSPITAL | Age: 51
End: 2021-07-19

## 2021-07-19 DIAGNOSIS — D75.1 POLYCYTHEMIA: Primary | ICD-10-CM

## 2021-07-23 ENCOUNTER — TELEPHONE (OUTPATIENT)
Dept: INTERNAL MEDICINE | Facility: CLINIC | Age: 51
End: 2021-07-23

## 2021-07-26 ENCOUNTER — OFFICE VISIT (OUTPATIENT)
Dept: HEMATOLOGY/ONCOLOGY | Facility: CLINIC | Age: 51
End: 2021-07-26
Payer: MEDICARE

## 2021-07-26 VITALS
BODY MASS INDEX: 38.09 KG/M2 | SYSTOLIC BLOOD PRESSURE: 123 MMHG | HEART RATE: 72 BPM | WEIGHT: 188.94 LBS | DIASTOLIC BLOOD PRESSURE: 65 MMHG | HEIGHT: 59 IN

## 2021-07-26 DIAGNOSIS — Z12.39 BREAST CANCER SCREENING, HIGH RISK PATIENT: ICD-10-CM

## 2021-07-26 DIAGNOSIS — Z91.89 INCREASED RISK OF BREAST CANCER: ICD-10-CM

## 2021-07-26 DIAGNOSIS — Z80.3 FAMILY HISTORY OF BREAST CANCER: Primary | ICD-10-CM

## 2021-07-26 PROCEDURE — 99999 PR PBB SHADOW E&M-EST. PATIENT-LVL IV: CPT | Mod: PBBFAC,,, | Performed by: PHYSICIAN ASSISTANT

## 2021-07-26 PROCEDURE — 99999 PR PBB SHADOW E&M-EST. PATIENT-LVL IV: ICD-10-PCS | Mod: PBBFAC,,, | Performed by: PHYSICIAN ASSISTANT

## 2021-07-26 PROCEDURE — 99214 OFFICE O/P EST MOD 30 MIN: CPT | Mod: S$PBB,,, | Performed by: PHYSICIAN ASSISTANT

## 2021-07-26 PROCEDURE — 99214 PR OFFICE/OUTPT VISIT, EST, LEVL IV, 30-39 MIN: ICD-10-PCS | Mod: S$PBB,,, | Performed by: PHYSICIAN ASSISTANT

## 2021-07-26 PROCEDURE — 99214 OFFICE O/P EST MOD 30 MIN: CPT | Mod: PBBFAC | Performed by: PHYSICIAN ASSISTANT

## 2021-07-27 ENCOUNTER — HOSPITAL ENCOUNTER (OUTPATIENT)
Dept: RADIOLOGY | Facility: OTHER | Age: 51
Discharge: HOME | End: 2021-07-27
Attending: INTERNAL MEDICINE
Payer: MEDICARE

## 2021-07-27 DIAGNOSIS — D75.1 POLYCYTHEMIA: ICD-10-CM

## 2021-07-27 DIAGNOSIS — N83.202 LEFT OVARIAN CYST: Primary | ICD-10-CM

## 2021-07-27 PROCEDURE — 74177 CT ABD & PELVIS W/CONTRAST: CPT | Mod: TC

## 2021-07-27 PROCEDURE — 74177 CT ABDOMEN PELVIS WITH CONTRAST: ICD-10-PCS | Mod: 26,,, | Performed by: RADIOLOGY

## 2021-07-27 PROCEDURE — 25500020 PHARM REV CODE 255: Performed by: INTERNAL MEDICINE

## 2021-07-27 PROCEDURE — 74177 CT ABD & PELVIS W/CONTRAST: CPT | Mod: 26,,, | Performed by: RADIOLOGY

## 2021-07-27 RX ADMIN — IOHEXOL 75 ML: 350 INJECTION, SOLUTION INTRAVENOUS at 01:07

## 2021-08-06 ENCOUNTER — PATIENT MESSAGE (OUTPATIENT)
Dept: INTERNAL MEDICINE | Facility: CLINIC | Age: 51
End: 2021-08-06

## 2021-08-06 ENCOUNTER — TELEPHONE (OUTPATIENT)
Dept: INTERNAL MEDICINE | Facility: CLINIC | Age: 51
End: 2021-08-06

## 2021-08-06 DIAGNOSIS — D75.1 POLYCYTHEMIA: Primary | ICD-10-CM

## 2021-08-10 ENCOUNTER — PATIENT MESSAGE (OUTPATIENT)
Dept: INTERNAL MEDICINE | Facility: CLINIC | Age: 51
End: 2021-08-10

## 2021-09-27 ENCOUNTER — PATIENT MESSAGE (OUTPATIENT)
Dept: RHEUMATOLOGY | Facility: CLINIC | Age: 51
End: 2021-09-27

## 2021-09-28 ENCOUNTER — PATIENT OUTREACH (OUTPATIENT)
Dept: ADMINISTRATIVE | Facility: OTHER | Age: 51
End: 2021-09-28

## 2021-09-29 ENCOUNTER — OFFICE VISIT (OUTPATIENT)
Dept: RHEUMATOLOGY | Facility: CLINIC | Age: 51
End: 2021-09-29
Payer: MEDICARE

## 2021-09-29 DIAGNOSIS — E79.0 HYPERURICEMIA: ICD-10-CM

## 2021-09-29 DIAGNOSIS — D75.1 POLYCYTHEMIA: ICD-10-CM

## 2021-09-29 DIAGNOSIS — M10.9 PODAGRA: Primary | ICD-10-CM

## 2021-09-29 PROCEDURE — 99205 PR OFFICE/OUTPT VISIT, NEW, LEVL V, 60-74 MIN: ICD-10-PCS | Mod: 95,,, | Performed by: INTERNAL MEDICINE

## 2021-09-29 PROCEDURE — 99205 OFFICE O/P NEW HI 60 MIN: CPT | Mod: 95,,, | Performed by: INTERNAL MEDICINE

## 2021-10-09 ENCOUNTER — PATIENT MESSAGE (OUTPATIENT)
Dept: OBSTETRICS AND GYNECOLOGY | Facility: CLINIC | Age: 51
End: 2021-10-09

## 2021-10-09 DIAGNOSIS — Z12.31 VISIT FOR SCREENING MAMMOGRAM: Primary | ICD-10-CM

## 2022-01-14 ENCOUNTER — OFFICE VISIT (OUTPATIENT)
Dept: INTERNAL MEDICINE | Facility: CLINIC | Age: 52
End: 2022-01-14
Payer: MEDICARE

## 2022-01-14 ENCOUNTER — TELEPHONE (OUTPATIENT)
Dept: SLEEP MEDICINE | Facility: CLINIC | Age: 52
End: 2022-01-14
Payer: MEDICARE

## 2022-01-14 VITALS
WEIGHT: 191.13 LBS | OXYGEN SATURATION: 97 % | HEIGHT: 59 IN | DIASTOLIC BLOOD PRESSURE: 70 MMHG | BODY MASS INDEX: 38.53 KG/M2 | SYSTOLIC BLOOD PRESSURE: 130 MMHG | HEART RATE: 81 BPM

## 2022-01-14 DIAGNOSIS — F31.9 BIPOLAR AFFECTIVE DISORDER, REMISSION STATUS UNSPECIFIED: ICD-10-CM

## 2022-01-14 DIAGNOSIS — E66.01 SEVERE OBESITY: ICD-10-CM

## 2022-01-14 DIAGNOSIS — G47.33 OSA (OBSTRUCTIVE SLEEP APNEA): ICD-10-CM

## 2022-01-14 DIAGNOSIS — D75.1 POLYCYTHEMIA: ICD-10-CM

## 2022-01-14 DIAGNOSIS — M51.36 DDD (DEGENERATIVE DISC DISEASE), LUMBAR: Primary | ICD-10-CM

## 2022-01-14 DIAGNOSIS — G89.29 CHRONIC LEFT SHOULDER PAIN: ICD-10-CM

## 2022-01-14 DIAGNOSIS — M25.512 CHRONIC LEFT SHOULDER PAIN: ICD-10-CM

## 2022-01-14 DIAGNOSIS — I10 ESSENTIAL HYPERTENSION: ICD-10-CM

## 2022-01-14 PROBLEM — M1A.9XX0 CHRONIC GOUT INVOLVING TOE OF RIGHT FOOT WITHOUT TOPHUS: Status: ACTIVE | Noted: 2022-01-14

## 2022-01-14 PROCEDURE — 99215 OFFICE O/P EST HI 40 MIN: CPT | Mod: PBBFAC | Performed by: INTERNAL MEDICINE

## 2022-01-14 PROCEDURE — 99417 PR PROLONGED SVC, OUTPT, W/WO DIRECT PT CONTACT,  EA ADDTL 15 MIN: ICD-10-PCS | Mod: S$PBB,,, | Performed by: INTERNAL MEDICINE

## 2022-01-14 PROCEDURE — 99215 OFFICE O/P EST HI 40 MIN: CPT | Mod: S$PBB,,, | Performed by: INTERNAL MEDICINE

## 2022-01-14 PROCEDURE — 99999 PR PBB SHADOW E&M-EST. PATIENT-LVL V: CPT | Mod: PBBFAC,,, | Performed by: INTERNAL MEDICINE

## 2022-01-14 PROCEDURE — 99215 PR OFFICE/OUTPT VISIT, EST, LEVL V, 40-54 MIN: ICD-10-PCS | Mod: S$PBB,,, | Performed by: INTERNAL MEDICINE

## 2022-01-14 PROCEDURE — 99417 PROLNG OP E/M EACH 15 MIN: CPT | Mod: S$PBB,,, | Performed by: INTERNAL MEDICINE

## 2022-01-14 PROCEDURE — 99999 PR PBB SHADOW E&M-EST. PATIENT-LVL V: ICD-10-PCS | Mod: PBBFAC,,, | Performed by: INTERNAL MEDICINE

## 2022-01-14 RX ORDER — CARIPRAZINE 1.5 MG/1
CAPSULE, GELATIN COATED ORAL DAILY
COMMUNITY
Start: 2021-06-24

## 2022-01-14 RX ORDER — FLUVOXAMINE MALEATE 50 MG/1
TABLET ORAL DAILY
COMMUNITY
Start: 2021-10-01 | End: 2023-05-17

## 2022-01-14 NOTE — PATIENT INSTRUCTIONS
"Thank you for your message. We have been getting a lot of questions like yours.     [facility] is offering COVID-19 vaccinations in accordance to the recommendations of the [facility] of [facility].  [facility] recommends scheduling your COVID Vaccine via [employer] by following the directions outlined below.      To Schedule your vaccine on the [employer] website:  Choose "Visits" then "Schedule an Appointment" and select the visit tile titled "COVID-19 Vaccine."    To Schedule your vaccine on the [employer] carol ann:  Choose "Appointments" then "Schedule an Appointment" then "Tell us why you're coming in" and select the visit tile titled "COVID Vaccine"      Patients may also call [phone] if they do not have a [facility] account.    More times and dates will become available as we receive more vaccine on a weekly basis. We encourage you to continue to check [facility] as more slots become available and appreciate your patience during this process.    Due to high activity, the [facility] website and COVID hotline may experience a slowdown or long wait times. We sincerely apologize for the inconvenience and appreciate your patience as you try and schedule your vaccination.    We are hopeful that the vaccine will be available to more members of the public soon. We encourage community members and patients to visit [email or web address] or [email or web address] or [email or web address].[place].gov for the latest information and resources.     "

## 2022-01-14 NOTE — PROGRESS NOTES
Subjective:       Patient ID: Rosa Lau is a 51 y.o. female who  has a past medical history of Bipolar disorder, Hyperlipidemia, Hypertension, and Macular degeneration of both eyes.    Chief Complaint: Hypertension and Back Pain     History was obtained from the patient and supplemented through chart review   -Saw Rheum for polyarthralgia.  -Reviewed outside records from [facility] RE C scope, Rheum labs.    HPI    Polyarthralgia, LBP, DDD, L shoulder pain:  Has back pain with movement. Saw OSH ortho. Had xrays c/w DDD. Rec PT which was very expensive, so had to stop. Has home exercises.     Had severe L shoulder pain and difficulty with abduction. Pain resolved and has improved ROM, but still with difficulty with abduction. OSH L shoulder Xray was reportedly wnl.    Saw OSH Rheum Dr. Escobar who seemed to focus more on mental health and dismissed FMA.    OSH Labs 7/2021 ESR/CRP neg. RF, VIRAL neg. HLA B27.  CK, uric acid wnl.     Had severe pain from gout of the R great toe that subsided.  Saw [facility] as well. Thought to be unusual for gout given age. Advised to send a picture, make appt if it recurs.  Encouraged low-impact exercise.    Taking Tylenol PRN, TENS unit for back flare after walking the [place] for the [email or web address].  Started yoga.    FHx: MGM with RA.      HEATHER:  +HA. Repeat PSG confirmed moderate HEATHER.  Following with sleep clinic.  Issues with CPAP recall. Restarted 3 wks ago, but not able to use the CPAP for very long d/t insomnia.  Chronic insomnia.  Has peristent daytime exhaustion. Falling asleep during inappropriate times. Is on Nuvigil without relief.  Tried lunesta. Following c OSH Psych.     HTN:    Pt's BP is controlled on HCTZ 12.5. Also on propranolol for anxiety/bipolar d/o. Tolerating meds well.     Obesity:    Exercise:  Has a stationary bike. Has been more sedentary. Started yoga. Difficulty d/t pain as above.  Rheumatology recommended low-impact exercises.  Diet:  H/o stress  eating. Working on diet.  BMI Readings from Last 3 Encounters:   01/14/22 38.61 kg/m²   07/26/21 38.16 kg/m²   07/16/21 38.20 kg/m²     Lab Results   Component Value Date/Time    HGBA1C 5.0 07/23/2020 09:03 AM     Polycythemia:  No tobacco use, dehydration.  No family history of polycythemia, but MGM had early stroke at age 35.  Following with Heme-Onc.  Thought to be secondary due to elevated EPO.  JAK2, MPN panel neg. Tachycardia was noted during visit and she had SOB.  D-dimer positive, but CT angio without PE.  Recommended to discontinue OCPs.  Repeat PSG confirmed HEATHER.    Discussed c Heme Onc, Dr. Camacho. Unlikely to be polycythema vera given neg Jak2. Epo was elevated, so rec r/o epo-secreting tumor such as RCC. CT A/P with normal kidneys, adrenal glands.      Persistent polycythemia, but hasn't been using CPAP regularly d/t recall; retarted about 1 mo ago.      Bipolar disorder, LIA:  Diagnosed at age 20. Had mild cognitive impairment through neuropsych testing. Follows with [facility] (Dr. Selena Weinstein, [facility]) and therapy. Adjusting meds. On Nuvigil              Not addressed today.   Incidental left ovarian cyst in on CT A/P for polycythemia. Will need to check pelvic ultrasound.    HLD:  Is currently taking fenofibrate. Diet as below.   FLP improved, controlled.  Continue fenofibrate. Monitor FLP annually.  Lab Results   Component Value Date    LDLCALC 86.8 07/16/2021     The 10-year ASCVD risk score (Paramjit RAUL Jr., et al., 2013) is: 1.6%    Values used to calculate the score:      Age: 51 years      Sex: Female      Is Non- : No      Diabetic: No      Tobacco smoker: No      Systolic Blood Pressure: 130 mmHg      Is BP treated: Yes      HDL Cholesterol: 40 mg/dL      Total Cholesterol: 150 mg/dL    Asthma:  Uses her rescue inhaler about twice a week and symbicort daily.  Takes Allegra.  Worse c hot weather.   Refilled albuterol inhaler.    HA:   Gradual onset HA.  Wakes up  with it and worsens throughout the day. HA is also worse when walking, putting away groceries. It is not worsened by recumbency.  No rhinorrhea, but does have postnasal drip.  Takes Allegra daily.  Headache resolved with Zanaflex, Medrol Dosepak.  Also on CPAP for HEATHER.    MRI brain 3/2021 s acute abnormality.  Improved. AR, HEATHER, neck pain contributing. Cont CPAP. Antihisamine, muscle relaxant PRN, neck stretches.    AR:  Likely contributing to HA as above.  Continue antihistamine.  No boggy turbinates on exam.    GERD:  No EGD before.  Used to take PPI daily, but now able to take Protonix PRN with improvement.  Doing well. Discussed avoiding fatty, large meals, acidic foods. Cont PPI PRN. F/u c [facility].    History of colon polyp:  +FHx colon ca.   Cscope c [facility] 12/2020 normal. Repeat in 5 years, 12/2025.    Elevated TC score:  MMG 11/2020. GM with breast ca.   Referred to breast clinic to discuss options, breast exam. Has repeat MMG.    Mild cognitive impairment:  Trouble with short-term memory, concentration.  Follows with Neurology.  MRI brain without acute abnormality.  Thought to be from psychiatric medications.    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  She follows with optho. Takes eye supplements (AREDS, fish oil, B complex, CoQ 10, carnitine). Goes to [facility]. Vision progressively worsening.     Review of Systems   Constitutional: Positive for fatigue. Negative for activity change and appetite change.   Respiratory: Negative for wheezing.    Cardiovascular: Negative for leg swelling.   Musculoskeletal: Positive for arthralgias and back pain.   Skin: Negative for rash and wound.   Hematological: Negative for adenopathy.   Psychiatric/Behavioral: Positive for sleep disturbance. Negative for confusion.       I personally reviewed Past Medical History, Past Surgical History, Social History, and Family  "History.    Objective:      Vitals:    01/14/22 0846   BP: 130/70   Pulse: 81   SpO2: 97%   Weight: 86.7 kg (191 lb 2.2 oz)   Height: 4' 11" (1.499 m)      Physical Exam  Constitutional:       General: She is not in acute distress.     Appearance: She is well-developed. She is not diaphoretic.   HENT:      Head: Normocephalic and atraumatic.      Nose: Nose normal.      Mouth/Throat:      Pharynx: No oropharyngeal exudate.      Comments: Mallampati 3  Eyes:      General: No scleral icterus.        Right eye: No discharge.         Left eye: No discharge.   Neck:      Thyroid: No thyromegaly.      Trachea: No tracheal deviation.   Cardiovascular:      Rate and Rhythm: Normal rate and regular rhythm.      Heart sounds: Normal heart sounds. No murmur heard.      Pulmonary:      Effort: Pulmonary effort is normal. No respiratory distress.      Breath sounds: Normal breath sounds. No wheezing.   Abdominal:      General: Bowel sounds are normal. There is no distension.      Palpations: Abdomen is soft.      Tenderness: There is no abdominal tenderness.   Musculoskeletal:         General: No deformity.      Cervical back: Neck supple.      Right lower leg: No edema.      Left lower leg: No edema.   Lymphadenopathy:      Cervical: No cervical adenopathy.   Skin:     General: Skin is warm and dry.      Findings: No erythema.   Neurological:      Mental Status: She is alert.      Cranial Nerves: No cranial nerve deficit.      Gait: Gait normal.   Psychiatric:         Behavior: Behavior normal.           Lab Results   Component Value Date    WBC 9.37 07/16/2021    HGB 16.3 (H) 07/16/2021    HCT 48.7 (H) 07/16/2021     07/16/2021    CHOL 150 07/16/2021    TRIG 116 07/16/2021    HDL 40 07/16/2021    ALT 22 07/16/2021    AST 21 07/16/2021     07/16/2021    K 4.5 07/16/2021     07/16/2021    CREATININE 1.1 07/16/2021    BUN 22 (H) 07/16/2021    CO2 24 07/16/2021    TSH 3.260 07/23/2020    HGBA1C 5.0 07/23/2020 "       The 10-year ASCVD risk score (Paramjitfranc CARTER Jr., et al., 2013) is: 1.6%    Values used to calculate the score:      Age: 51 years      Sex: Female      Is Non- : No      Diabetic: No      Tobacco smoker: No      Systolic Blood Pressure: 130 mmHg      Is BP treated: Yes      HDL Cholesterol: 40 mg/dL      Total Cholesterol: 150 mg/dL    (Imaging have been independently reviewed)  CT A/P with normal kidneys, adrenal glands.      Assessment:       1. DDD (degenerative disc disease), lumbar    2. Chronic left shoulder pain    3. HEATHER (obstructive sleep apnea)    4. Essential hypertension    5. Severe obesity    6. Polycythemia    7. Bipolar affective disorder, remission status unspecified          Plan:       Rosa was seen today for hypertension and back pain.    Diagnoses and all orders for this visit:    DDD (degenerative disc disease), lumbar  Comments:  DDx frozen shoulder, FMA, OA. Would benefit from Healthy Back program, PT. She will consider d/t cost. Seeing psych/therapy, Rheumatology.  Orders:  -     Ambulatory referral/consult to [facility]; Future    Chronic left shoulder pain  Comments:  Would benefit from PT as above. She will consider.  Orders:  -     Ambulatory referral/consult to [facility]; Future    HEATHER (obstructive sleep apnea)  Comments:  Contributing to HA, fatigue, and polycythemia. F/u sleep clinic, Psych. Recently restarted CPAP.    Essential hypertension  Comments:  Controlled. Cont HCTZ 12.5, propranolol for mood.     Severe obesity  Comments:  Stable. Needs to work on increasing low impact exercise as tolerated. Recommended healthy back program for mobility, exercises.    Polycythemia  Comments:  Recently restarted CPAP. Plan to repeat CBC in 4 mo. Cont ASA 81. If persistent polycythemia, resched Heme Onc for BMBx.    Bipolar affective disorder, remission status unspecified  Comments:  F/u with OSH Psych, therapy. Adjusting meds. Consider Cymbalta  d/t chronic pain, Wellbutrin d/t fatigue.         Side effects of medication(s) were discussed in detail and patient voiced understanding.  Patient will call back for any issues or complications.     I have spent a total of 77 minutes with the patient as well as reviewing the chart/medical record and placing orders on the day of the visit. Discussed chronic pain, sleep, HEATHER, mental health.     RTC in 3 month(s) or sooner PRN for HTN, coordination of care with labs prior.

## 2022-01-18 ENCOUNTER — OFFICE VISIT (OUTPATIENT)
Dept: SLEEP MEDICINE | Facility: CLINIC | Age: 52
End: 2022-01-18
Payer: MEDICARE

## 2022-01-18 DIAGNOSIS — G47.33 OSA (OBSTRUCTIVE SLEEP APNEA): ICD-10-CM

## 2022-01-18 DIAGNOSIS — J45.30 MILD PERSISTENT ASTHMA, UNCOMPLICATED: ICD-10-CM

## 2022-01-18 DIAGNOSIS — I10 ESSENTIAL HYPERTENSION: ICD-10-CM

## 2022-01-18 DIAGNOSIS — F41.1 GAD (GENERALIZED ANXIETY DISORDER): Primary | ICD-10-CM

## 2022-01-18 DIAGNOSIS — E66.9 OBESITY (BMI 35.0-39.9 WITHOUT COMORBIDITY): ICD-10-CM

## 2022-01-18 DIAGNOSIS — D75.1 POLYCYTHEMIA: ICD-10-CM

## 2022-01-18 DIAGNOSIS — F31.9 BIPOLAR 1 DISORDER: ICD-10-CM

## 2022-01-18 PROCEDURE — 99213 PR OFFICE/OUTPT VISIT, EST, LEVL III, 20-29 MIN: ICD-10-PCS | Mod: 95,,, | Performed by: INTERNAL MEDICINE

## 2022-01-18 PROCEDURE — 99213 OFFICE O/P EST LOW 20 MIN: CPT | Mod: 95,,, | Performed by: INTERNAL MEDICINE

## 2022-01-18 NOTE — PROGRESS NOTES
Subjective:       Patient ID: Rosa Lau is a 51 y.o. female.    Chief Complaint: Sleep Apnea    HPI     Rosa Lau returns for PAP follow up for difficulty with use         Rosa Lau has a history of moderate Obstructive Sleep Apnea diagnosed in 2020 (AHI 22.3).   The last PAP titration was NA with AHI NA at NA cm H20.   Device is 1-2 month(s) old.   Current setting 5-11 cm H20.   D[facility]        Rosa Lau is using CPAP 76.7% of nights and averages 3 hours and 11 minutes.   Device use greater than 4 hours is 20%.   Patient does not have problems with the pressure setting.   Mask interface issues are experienced.   A NP mask interface is used.   The patient does experience side effects from therapy including prolonged awakenings.   The patient experiences the following benefits of therapy:  none   There are not equipment issues.   Mean pressure 5.7, average peak pressure 6.8 and 90th percentile pressure 8.8.   Estimated AHI 1.7.    Reports prolonged awakenings.   Daytime sleepiness.        Importance of PAP compliance discussed.   Care and use of equipment discussed.   Download and relevant sleep studies reviewed with patient    Discussed therapeutic goals for positive airway pressure therapy(CPAP or BiPAP).  Ideal is usage 100% of nights for at least 6 hours per night.  Minimum usage is 70% of night for at least 4 hours per night used    The patient was given open opportunity to ask questions and/or express concerns about treatment plan.   All questions/concerns were discussed.            Review of Systems      Objective:      Physical Exam    Assessment:       Problem List Items Addressed This Visit        Psychiatric    LIA (generalized anxiety disorder) - Primary    Bipolar 1 disorder       Pulmonary    Mild persistent asthma, uncomplicated       Cardiac/Vascular    Essential hypertension       Oncology    Polycythemia       Endocrine    Obesity (BMI 35.0-39.9 without  comorbidity)       Other    HEATHER (obstructive sleep apnea)          Plan:       Sleep hygiene reviewed.   Increase duration of CPAP use.       The patient location is: home  The chief complaint leading to consultation is: hypersomnia    Visit type: audiovisual    Face to Face time with patient: 13  16 minutes of total time spent on the encounter, which includes face to face time and non-face to face time preparing to see the patient (eg, review of tests), Obtaining and/or reviewing separately obtained history, Documenting clinical information in the electronic or other health record, Independently interpreting results (not separately reported) and communicating results to the patient/family/caregiver, or Care coordination (not separately reported).         Each patient to whom he or she provides medical services by telemedicine is:  (1) informed of the relationship between the physician and patient and the respective role of any other health care provider with respect to management of the patient; and (2) notified that he or she may decline to receive medical services by telemedicine and may withdraw from such care at any time.    Notes:

## 2022-01-21 ENCOUNTER — PATIENT MESSAGE (OUTPATIENT)
Dept: OBSTETRICS AND GYNECOLOGY | Facility: CLINIC | Age: 52
End: 2022-01-21
Payer: MEDICARE

## 2022-01-21 DIAGNOSIS — Z78.0 MENOPAUSE: ICD-10-CM

## 2022-01-21 DIAGNOSIS — F06.31 MOOD DISORDER DUE TO KNOWN PHYSIOLOGICAL CONDITION WITH DEPRESSIVE FEATURES: ICD-10-CM

## 2022-01-21 DIAGNOSIS — R41.89 BRAIN FOG: Primary | ICD-10-CM

## 2022-01-25 ENCOUNTER — LAB VISIT (OUTPATIENT)
Dept: LAB | Facility: OTHER | Age: 52
End: 2022-01-25
Attending: OBSTETRICS & GYNECOLOGY
Payer: MEDICARE

## 2022-01-25 ENCOUNTER — PATIENT OUTREACH (OUTPATIENT)
Dept: ADMINISTRATIVE | Facility: OTHER | Age: 52
End: 2022-01-25
Payer: MEDICARE

## 2022-01-25 DIAGNOSIS — R41.89 BRAIN FOG: ICD-10-CM

## 2022-01-25 DIAGNOSIS — Z78.0 MENOPAUSE: ICD-10-CM

## 2022-01-25 DIAGNOSIS — F06.31 MOOD DISORDER DUE TO KNOWN PHYSIOLOGICAL CONDITION WITH DEPRESSIVE FEATURES: ICD-10-CM

## 2022-01-25 LAB
ESTRADIOL SERPL-MCNC: 18 PG/ML
FSH SERPL-ACNC: 51.53 MIU/ML
TSH SERPL DL<=0.005 MIU/L-ACNC: 2 UIU/ML (ref 0.4–4)

## 2022-01-25 PROCEDURE — 82670 ASSAY OF TOTAL ESTRADIOL: CPT | Performed by: OBSTETRICS & GYNECOLOGY

## 2022-01-25 PROCEDURE — 36415 COLL VENOUS BLD VENIPUNCTURE: CPT | Performed by: OBSTETRICS & GYNECOLOGY

## 2022-01-25 PROCEDURE — 84443 ASSAY THYROID STIM HORMONE: CPT | Performed by: OBSTETRICS & GYNECOLOGY

## 2022-01-25 PROCEDURE — 83001 ASSAY OF GONADOTROPIN (FSH): CPT | Performed by: OBSTETRICS & GYNECOLOGY

## 2022-01-25 NOTE — PROGRESS NOTES
Care Everywhere: updated  Immunization: updated  Health Maintenance: updated  Media Review:   Legacy Review:   DIS:  Order placed:   Upcoming appts:mammogram 2.22  EFAX:  Task Tickets:  Referrals:

## 2022-01-27 ENCOUNTER — OFFICE VISIT (OUTPATIENT)
Dept: OBSTETRICS AND GYNECOLOGY | Facility: CLINIC | Age: 52
End: 2022-01-27
Attending: OBSTETRICS & GYNECOLOGY
Payer: MEDICARE

## 2022-01-27 DIAGNOSIS — N95.1 MENOPAUSAL SYNDROME: Primary | ICD-10-CM

## 2022-01-27 PROCEDURE — 99213 OFFICE O/P EST LOW 20 MIN: CPT | Mod: 95,,, | Performed by: OBSTETRICS & GYNECOLOGY

## 2022-01-27 PROCEDURE — 99213 PR OFFICE/OUTPT VISIT, EST, LEVL III, 20-29 MIN: ICD-10-PCS | Mod: 95,,, | Performed by: OBSTETRICS & GYNECOLOGY

## 2022-01-27 RX ORDER — PROGESTERONE 100 MG/1
100 CAPSULE ORAL NIGHTLY
Qty: 30 CAPSULE | Refills: 11 | Status: SHIPPED | OUTPATIENT
Start: 2022-01-27 | End: 2022-05-04

## 2022-01-27 NOTE — Clinical Note
This is a mutual patient. She is now perimenopausal and we think that is affecting her sleep. I am going to add Prometrium at night but was wondering your thoughts on estrogen with her Polycythemia. Thanks! Randa Dumont

## 2022-01-27 NOTE — PROGRESS NOTES
The patient location is: home in [place]  The chief complaint leading to consultation is: menopause, insomnia    Visit type: audiovisual    Face to Face time with patient: 15 minutes  20 minutes of total time spent on the encounter, which includes face to face time and non-face to face time preparing to see the patient (eg, review of tests), Obtaining and/or reviewing separately obtained history, Documenting clinical information in the electronic or other health record, Independently interpreting results (not separately reported) and communicating results to the patient/family/caregiver, or Care coordination (not separately reported).         Each patient to whom he or she provides medical services by telemedicine is:  (1) informed of the relationship between the physician and patient and the respective role of any other health care provider with respect to management of the patient; and (2) notified that he or she may decline to receive medical services by telemedicine and may withdraw from such care at any time.    Notes: Patient is here for a telemedicine visit to discuss her new diagnosis of perimenopause based on the labs. Her main complaint is insomnia. Only sleeping 3 hours per night. She also has some hot flashes, night sweats, joint pains. In the past her moods and depression were better controlled on OCP for years. We have had to stop the OCP because of possible eye infarcts which was ruled out and then most recently polycythemia. Her HemOnc felt that if she could treat her sleep apnea then that may improve her polycythemia. She has not been able to use her CPAP machine because her insomnia is so bad. We discussed this is detail. She has always done better on hormones- estrogen and progesterone. I recommend we start Prometrium at night to see if that helps with insomnia and I will check with Hem/Onc if estrogen is ok with polycythemia. Patient agrees with plan. She will message me in a few days to let me  know if insomnia has improved at all with Prometrium. If not consider adding estrogen.     1. Menopausal syndrome  progesterone (PROMETRIUM) 100 MG capsule

## 2022-01-30 ENCOUNTER — PATIENT MESSAGE (OUTPATIENT)
Dept: INTERNAL MEDICINE | Facility: CLINIC | Age: 52
End: 2022-01-30
Payer: MEDICARE

## 2022-01-30 DIAGNOSIS — K21.9 GASTROESOPHAGEAL REFLUX DISEASE: ICD-10-CM

## 2022-01-31 ENCOUNTER — PATIENT MESSAGE (OUTPATIENT)
Dept: OBSTETRICS AND GYNECOLOGY | Facility: CLINIC | Age: 52
End: 2022-01-31
Payer: MEDICARE

## 2022-01-31 RX ORDER — PANTOPRAZOLE SODIUM 40 MG/1
TABLET, DELAYED RELEASE ORAL
Qty: 90 TABLET | Refills: 0 | Status: SHIPPED | OUTPATIENT
Start: 2022-01-31 | End: 2022-09-12 | Stop reason: SDUPTHER

## 2022-01-31 RX ORDER — ESTRADIOL 1 MG/1
1 TABLET ORAL DAILY
Qty: 90 TABLET | Refills: 3 | Status: SHIPPED | OUTPATIENT
Start: 2022-01-31 | End: 2022-05-04

## 2022-01-31 NOTE — TELEPHONE ENCOUNTER
No new care gaps identified.  Powered by [employer]. Reference number: [id number].   1/31/2022 3:05:10 PM CST

## 2022-02-10 ENCOUNTER — CLINICAL SUPPORT (OUTPATIENT)
Dept: REHABILITATION | Facility: OTHER | Age: 52
End: 2022-02-10
Attending: INTERNAL MEDICINE
Payer: MEDICARE

## 2022-02-10 DIAGNOSIS — M51.36 DDD (DEGENERATIVE DISC DISEASE), LUMBAR: ICD-10-CM

## 2022-02-10 DIAGNOSIS — R29.898 DECREASED STRENGTH OF TRUNK AND BACK: ICD-10-CM

## 2022-02-10 DIAGNOSIS — M25.69 DECREASED ROM OF TRUNK AND BACK: ICD-10-CM

## 2022-02-10 DIAGNOSIS — M25.512 CHRONIC LEFT SHOULDER PAIN: ICD-10-CM

## 2022-02-10 DIAGNOSIS — G89.29 CHRONIC LEFT SHOULDER PAIN: ICD-10-CM

## 2022-02-11 ENCOUNTER — TELEPHONE (OUTPATIENT)
Dept: HEMATOLOGY/ONCOLOGY | Facility: CLINIC | Age: 52
End: 2022-02-11
Payer: MEDICARE

## 2022-02-11 ENCOUNTER — PATIENT MESSAGE (OUTPATIENT)
Dept: HEMATOLOGY/ONCOLOGY | Facility: CLINIC | Age: 52
End: 2022-02-11
Payer: MEDICARE

## 2022-02-11 NOTE — TELEPHONE ENCOUNTER
Spoke with  and she will call her provider to see if she can get a later appt time because she has a virtual appt at 10am-11am on (2/16). Once she gets back with me on that option I can move  to that slot and time on (2/18) with Clint.  But if she can't I can try to move her to another appt date.

## 2022-02-11 NOTE — PLAN OF CARE
[facility]     Name: Rosa Lau  Clinic Number: [id number]    Therapy Diagnosis:   Encounter Diagnoses   Name Primary?    Chronic left shoulder pain     DDD (degenerative disc disease), lumbar     Decreased ROM of trunk and back     Decreased strength of trunk and back      Physician: Shavon Price MD    Physician Orders: PT Eval and Treat   Medical Diagnosis from Referral: DDD (degenerative disc disease), lumbar  Evaluation Date: 2/10/2022  Authorization Period Expiration: M51.36 (ICD-10-CM) - DDD (degenerative disc disease), lumbar  Plan of Care Expiration: 5/10/2022  Visit # / Visits authorized: 1/ 1    (jennifer in [employer])    Time In: 415  Time Out: 545  Total Billable Time: 90 minutes    Precautions: Standard    Pattern of pain determined: 1 (instability)      Subjective   Date of onset: At least a decade ago  History of current condition - Shona reports: Broken R foot May 2019, was the fourth injury/break to that foot/ankle. Every time it has taken longer to recover from that injury, and this past time she started to also have back pain progressively moreso. Significant decrease in activity during the pandemic in 2020, did nothing outside the house until Christmas 2020 which produced excruciating low back pain that took a few days to recover from. Has become increasingly more present and more intense since then. She did PT for the issue in spring of 2021 and felt no benefit and actually felt worse, but eventually stopped going for financial reasons. She is hoping to see results from trying therapy again.     Medical History:   Past Medical History:   Diagnosis Date    Bipolar disorder     Hyperlipidemia     Hypertension     Macular degeneration of both eyes        Surgical History:   Rosa Lau  has a past surgical history that includes Ovarian cyst surgery; ankle plate surgery ; Fracture surgery (8/01/2015); and Exploratory  laparotomy.    Medications:   Rosa has a current medication list which includes the following prescription(s): aspirin, vraylar, clonazepam, estradiol, fenofibrate, fluvoxamine, hydrochlorothiazide, pantoprazole, progesterone, propranolol, and ventolin hfa.    Allergies:   Review of patient's allergies indicates:   Allergen Reactions    Cat's claw Shortness Of Breath        Imaging, Evidence of multilevel disc degeneration and osteoarthritis but no imaging in her chart    Prior Therapy: Multiple times but for her R foot/ankle  Prior Treatment: Nothing   Social History:  lives with their partner  Occupation: Disability since 2018  Leisure: Hanging out at home, watching TV  Prior Level of Function: Independent   Current Level of Function: Independent but with significant activity tolerance limitations  DME owned/used: None        Pain:  Current 3/10, worst 8/10, best 3/10   Location: right back  and buttocks   Description: Aching, Grabbing and Tight  Aggravating Factors: Standing, Bending, Walking and Lifting  Easing Factors: pain medication, ice and heating pad  Disturbed Sleep: Can keep me up but never wakes me up        Pattern of pain questions:  1.  Where is your pain the worst? R SIJ/low back  2.  Is your pain constant or intermittent? Constant   3.  Does bending forward make your typical pain worse? Sometimes  4.  Since the start of your back pain, has there been a change in your bowel or bladder? No  5.  What can't you do now that you use to be able to do? Any prolonged standing activity      Pts goals: Be able to walk 30 minutes with only minimal low back pain      Red Flag Screening:   Cough  Sneeze  Strain: (--)  Bladder/ bowel: (--)  Falls: (--)  Night pain: (--)  Unexplained weight loss: (--)  General health: Good    OBJECTIVE     Postural examination/scapula alignment: Rounded shoulder, Head forward and Slouched posture  Joint integrity: Relative hypermobility at L3/4, 4/5, otherwise relatively  stiff  Skin integrity: Intact  Edema: None  Sitting: Fair  Standing: Good  Correction of posture: better with lumbar roll    MOVEMENT LOSS    ROM Loss   Flexion minimal loss   Extension moderate loss   Side glide Right minimal loss   Side glide Left minimal loss   Rotation Right minimal loss   Rotation Left minimal loss     Lower Extremity Strength  Right LE  Left LE    Hip flexion: 5/5 Hip flexion: 5/5   Hip extension:  4/5 Hip extension: 4/5   Hip abduction: 4+/5 Hip abduction: 4+/5   Hip adduction:  4+/5 Hip adduction:  4+/5   Hip External Rotation 4+/5 Hip External Rotation 4+/5   Hip Internal rotation   4+/5 Hip Internal rotation 4+/5   Knee Flexion 5/5 Knee Flexion 5/5   Knee Extension 5/5 Knee Extension 5/5   Ankle dorsiflexion: 5/5 Ankle dorsiflexion: 5/5   Ankle plantarflexion: 5/5 Ankle plantarflexion: 5/5       GAIT:  Assistive Device used: none  Level of Assistance: independent  Patient displays the following gait deviations:  no gait deviations observed.     Special Tests:   Test Name  Test Result   Prone Instability Test (--)   SI Joint Provocation Test (+)   Straight Leg Raise (--)   Neural Tension Test (--)   Crossed Straight Leg Raise (--)   Walking on toes (--)   Walking on heels  (--)       NEUROLOGICAL SCREENING     Sensory deficit: Intact L/T    Reflexes:    Left Right   Patella Tendon 2+ 2+   Achilles Tendon 2+ 2+   Babinski  (--) (--)   Clonus (--) (--)     REPEATED TEST MOVEMENTS:    Baseline symptoms:  Repeated Flexion in Standing end range pain  no worse   Repeated Extension in Standing pain during motion  no worse   Repeated Flexion in lying no effect   Repeated Extension in lying  end range pain  no worse       STATIC TESTS and other movements:   Baseline symptoms:  Prone lie no effect   Prone lie on elbows end range pain  no worse   Sustained prone with  using mat NT   Sustained flexion no effect   Sitting slouched  worse   Sitting erect better   Standing slouched no effect   Standing  erect  slightly worse   Lying prone in extension  slightly worse   Long sitting   no effect   Other tests Repeated Test Movements:[id number]}       Baseline Isometric Testing on Med X equipment: Testing administered by PT  Date of testin/10/2022  ROM 6-36 deg   Max Peak Torque 168    Min Peak Torque 66    Flex/Ext Ratio 2.8:1   % below normative data 11   *24% below at 0 degrees, also likely excess leg push at 36 degrees contributing to large ration      Limitation/Restriction for FOTO 2/10/2022 Survey    Therapist reviewed FOTO scores for Rosa Lau on 2/10/2022.   FOTO documents entered into [facility] - see Media section.    Limitation Score: 48%  Goal score: 30%           Treatment   Treatment Time In: 515  Treatment Time Out: 545  Total Treatment time separate from Evaluation: 30 minutes      Rosa received therapeutic exercises to develop/improve posture, lumbar/cervical ROM, strength and muscular endurance for 30 minutes including the following exercises:     Med x dynamic exercise and baseline IM test    [facility] 2/10/2022   Visit Number 1   VAS Pain Rating 3   Lumbar Extension Seat Pad 2   Femur Restraint 7   Top Dead Center 24   Counterweight 118   Lumbar Flexion 36   Lumbar Extension 6   Lumbar Peak Torque 168   Min Torque 66   Test Percent Below Normative Data 11   Ice - Z Lie (in min.) 10         Written Home Exercises Provided: yes.  Exercises were reviewed and Shona was able to demonstrate them prior to the end of the session.  Shona demonstrated good  understanding of the education provided.     See EMR under Patient Instructions for exercises provided 2/10/2022.      Education provided:   - Patient received education regarding proper posture and body mechanics.  Patient was given top [facility] Visit 1 handouts which discuss what to expect in therapy, the purpose and opportunity for health coaching, the program,  wellness when discharged from therapy, back education and care  specifically for posture seated, standing, lifting correctly, components of exercise, importance of nutrition and hydration, and importance of sleep.   Information on lumbar rolls provided.  - Angela roll tried, recommended, and purchase information was provided.  - Patient received a handout regarding anticipated muscular soreness following the isometric test and strategies for management were reviewed with patient including stretching, using ice and scheduled rest.   - Patient received education on the [facility] program, purpose of the isometric test, progression of back strengthening as well as wellness approach and systemic strengthening.  Details of the program were discussed.  Reviewed that patient should feel support/pressure from med ex restraints but no pain or discomfort and patient expressed understanding.    Shona received cold pack for 10 minutes to low back in Z lie.    Assessment   Rosa is a 51 y.o. female referred to [facility] with a medical diagnosis of  DDD (degenerative disc disease), lumbar. Pt presents with symptoms consistent with diagnosis, including pain with extension almost throughout motion which was present in R SIJ area and also R innominate downslip/forward rotation which both presented improved alignment and improved sx after MET for correction. No relief of symptoms with flexion and instability so placed as Pattern 1  At this time. Will benefit from program in order to improve muscle strength and activation in order to improve tolerance for all daily activities.    Pain Pattern: 1 (instability)    Pt prognosis is Good.   Pt will benefit from skilled outpatient Physical Therapy to address the deficits stated above and in the chart below, provide pt/family education, and to maximize pt's level of independence. Based on the above history and physical examination an active physical therapy program is recommended.  Pt will continue to benefit from skilled outpatient  physical therapy to address the deficits listed below in the chart, provide pt/family education and to maximize pt's level of independence in the home and community environment. .       Plan of care discussed with patient: Yes  Pt's spiritual, cultural and educational needs considered and patient is agreeable to the plan of care and goals as stated below:     Anticipated Barriers for therapy: None    PT Evaluation Completed? Yes    Medical necessity is demonstrated by the following problem list.    Pt presents with the following impairments:     History  Co-morbidities and personal factors that may impact the plan of care Co-morbidities:   anxiety, depression, high BMI, HTN, level of undertstanding of current condition and PMDD    Personal Factors:   coping style  lifestyle  attitudes     moderate   Examination  Body Structures and Functions, activity limitations and participation restrictions that may impact the plan of care Body Regions:   back    Body Systems:    ROM  strength    Participation Restrictions:   Decreased activity tolerance secondary to pain and weakness    Activity limitations:   Learning and applying knowledge  no deficits    General Tasks and Commands  no deficits    Communication  no deficits    Mobility  lifting and carrying objects  walking    Self care  no deficits    Domestic Life  cooking  doing house work (cleaning house, washing dishes, laundry)  assisting others    Interactions/Relationships  no deficits    Life Areas  no deficits    Community and Social Life  recreation and leisure         low   Clinical Presentation stable and uncomplicated low   Decision Making/ Complexity Score: low       GOALS: Pt is in agreement with the following goals.    Short term goals:  6 weeks or 10 visits   1.  Pt will demonstrate increased lumbar ROM by at least 9 degrees from the initial ROM value with improvements noted in functional ROM and ability to perform ADLs.  (approp and ongoing)  2.  Pt will  demonstrate increased MedX average isometric strength value  by 25% from initial test resulting in improved ability to perform bending, lifting, and carrying activities safely, confidently.  (approp and ongoing)  3.  Patient report a reduction in worst pain score by 1-2 points for improved tolerance for standing and cooking in her kitchen.  (approp and ongoing)  4.  Pt able to perform HEP correctly with minimal cueing or supervision from therapist to encourage independent management of symptoms. (approp and ongoing)      Long term goals: 10 weeks or 20 visits   1. Pt will demonstrate increased lumbar ROM by at least 18 degrees from initial ROM value, resulting in improved ability to perform functional fwd bending while standing and sitting. (approp and ongoing)  2. Pt will demonstrate increased MedX average isometric strength value  by 50% from initial test resulting in improved ability to perform bending, lifting, and carrying activities safely, confidently.  (approp and ongoing)  3. Pt to demonstrate ability to independently control and reduce their pain through posture positioning and mechanical movements throughout a typical day.  (approp and ongoing)  4.  Pt will demonstrate reduced pain and improved functional outcomes as reported on the FOTO by reaching a limitation score of < or = 30% or less in order to demonstrate subjective improvement in pt's condition.    (approp and ongoing)  5. Pt will demonstrate independence with the HEP at discharge  (approp and ongoing)  6.  Patient will report being able to stand for at least 30 minutes with no greater than 2/10 low back pain  (approp and ongoing)      Plan   Outpatient physical therapy 2x week for 10 weeks or 20 visits to include the following:   - Patient education  - Therapeutic exercise  - Manual therapy  - Performance testing   - Neuromuscular Re-education  - Therapeutic activity   - Modalities    Pt may be seen by PTA as part of the rehabilitation team.  "    Therapist: Uvaldo Turk, PT  2/10/2022    "I certify the need for these services furnished under this plan of treatment and while under my care."    ____________________________________  Physician/Referring Practitioner    _______________  Date of Signature            "

## 2022-02-14 ENCOUNTER — PATIENT MESSAGE (OUTPATIENT)
Dept: HEMATOLOGY/ONCOLOGY | Facility: CLINIC | Age: 52
End: 2022-02-14
Payer: MEDICARE

## 2022-02-14 ENCOUNTER — TELEPHONE (OUTPATIENT)
Dept: REHABILITATION | Facility: OTHER | Age: 52
End: 2022-02-14
Payer: MEDICARE

## 2022-02-14 NOTE — TELEPHONE ENCOUNTER
HC called Pt to talk to her about her experience during the evaluation and to go over our health coaching services; I left a message.   
2020

## 2022-02-16 ENCOUNTER — LAB VISIT (OUTPATIENT)
Dept: LAB | Facility: HOSPITAL | Age: 52
End: 2022-02-16
Payer: MEDICARE

## 2022-02-16 ENCOUNTER — OFFICE VISIT (OUTPATIENT)
Dept: HEMATOLOGY/ONCOLOGY | Facility: CLINIC | Age: 52
End: 2022-02-16
Payer: MEDICARE

## 2022-02-16 DIAGNOSIS — Z80.3 FAMILY HISTORY OF BREAST CANCER: ICD-10-CM

## 2022-02-16 DIAGNOSIS — Z80.0 FAMILY HISTORY OF COLON CANCER: ICD-10-CM

## 2022-02-16 DIAGNOSIS — Z83.719 FAMILY HISTORY OF COLONIC POLYPS: ICD-10-CM

## 2022-02-16 DIAGNOSIS — Z80.0 FAMILY HISTORY OF PANCREATIC CANCER: ICD-10-CM

## 2022-02-16 DIAGNOSIS — Z71.83 ENCOUNTER FOR NONPROCREATIVE GENETIC COUNSELING: Primary | ICD-10-CM

## 2022-02-16 PROCEDURE — 99417 PR PROLONGED SVC, OUTPT, W/WO DIRECT PT CONTACT,  EA ADDTL 15 MIN: ICD-10-PCS | Mod: S$PBB,,, | Performed by: PHYSICIAN ASSISTANT

## 2022-02-16 PROCEDURE — 99417 PROLNG OP E/M EACH 15 MIN: CPT | Mod: S$PBB,,, | Performed by: PHYSICIAN ASSISTANT

## 2022-02-16 PROCEDURE — 99215 PR OFFICE/OUTPT VISIT, EST, LEVL V, 40-54 MIN: ICD-10-PCS | Mod: S$PBB,,, | Performed by: PHYSICIAN ASSISTANT

## 2022-02-16 PROCEDURE — 99215 OFFICE O/P EST HI 40 MIN: CPT | Mod: S$PBB,,, | Performed by: PHYSICIAN ASSISTANT

## 2022-02-16 PROCEDURE — 99213 OFFICE O/P EST LOW 20 MIN: CPT | Mod: PBBFAC | Performed by: PHYSICIAN ASSISTANT

## 2022-02-16 PROCEDURE — 99999 PR PBB SHADOW E&M-EST. PATIENT-LVL III: ICD-10-PCS | Mod: PBBFAC,,, | Performed by: PHYSICIAN ASSISTANT

## 2022-02-16 PROCEDURE — 99999 PR PBB SHADOW E&M-EST. PATIENT-LVL III: CPT | Mod: PBBFAC,,, | Performed by: PHYSICIAN ASSISTANT

## 2022-02-16 PROCEDURE — 36415 COLL VENOUS BLD VENIPUNCTURE: CPT | Performed by: PHYSICIAN ASSISTANT

## 2022-02-16 NOTE — PROGRESS NOTES
"Cancer Genetics  Hereditary/High Risk Clinic  Department of Hematology and Oncology  [facility]    Date of Service:  22  Provider:  DELBERT Mckinley PA-C    Patient Information  Name:  Rosa Lau  :  1970  MRN:  [id number]     Referring Provider    Letty Garcia PA-C  [address]  [address]  [place] [address]    SUBJECTIVE      Chief Complaint: Genetic evaluation  History of Present Illness (HPI):  Rosa Lau ("Shona"), 51 y.o., assigned female sex at birth , is established with the [facility] of [facility] but new to me.  She was referred by Letty Garcia PA-C for genetic cancer risk assessment given her family history of breast cancer.    Focused Medical History    Germline cancer-genetic testing:  No   Cancer:  No   Colon polyp: Yes - 2 colonoscopies, 4 benign (not pre-cancerous) polyps on the first one (~), none on the second (~).    Benign tumor:  No   Pancreatitis:  No    Chemoprevention Use   Never used    Focused Surgical History   Reproductive organs intact     Past Medical History:   Diagnosis Date    Bipolar disorder     Hyperlipidemia     Hypertension     Macular degeneration of both eyes      Patient Active Problem List    Diagnosis Date Noted    Decreased ROM of trunk and back 02/10/2022    Decreased strength of trunk and back 02/10/2022    Severe obesity 2022    Chronic gout involving toe of right foot without tophus 2022    DDD (degenerative disc disease), lumbar 2021    Memory loss 2021    Increased risk of breast cancer 2020    Polycythemia 2020    ELY (dyspnea on exertion) 2020    HEATHER (obstructive sleep apnea) 2020    Obesity (BMI 35.0-39.9 without comorbidity) 2020    Tension type headache 2020    Allergic rhinitis 2020    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    Retinoschisis, bilateral " 11/25/2019    PMDD (premenstrual dysphoric disorder) 09/18/2019    LIA (generalized anxiety disorder) 09/18/2019    Bipolar 1 disorder 09/18/2019    Mixed hyperlipidemia 09/18/2019    Retinal disease, bilateral 09/18/2019    Gastroesophageal reflux disease 06/12/2018    Mild persistent asthma, uncomplicated 06/12/2018    Cognitive dysfunction in bipolar disorder 06/12/2018    Essential hypertension 02/15/2018    History of colonic polyps 08/20/2014      Focused Family History   Ashkenazi [place]ze ancestry:  No   Consanguinity in ancestors:  No   Germline cancer-genetic testing in blood relatives:  No    Family Cancer Pedigree  No known history of cancer of colorectal polyps in extended family other than noted.      Family History   Problem Relation Age of Onset    Lung cancer Father         possibly - lung cancer vs. lung infection? - +smoker    Hypertension Father     Colon polyps Mother         5-10 pre-cancerous polyps on every scope done q3 years.    COPD Mother     Hypertension Mother     Stroke Maternal Grandmother 33    Breast cancer Maternal Aunt 40        no details    Suicide Maternal Grandfather     Diabetes Paternal Grandmother     Lung cancer Maternal Uncle         COD    Liver cancer Maternal Uncle     Colon cancer Other 35    Breast cancer Other 30        bilat, COD    Pancreatic cancer Other 60    Ovarian cancer Neg Hx       Review of Systems   See HPI.     Pain 4/10 lower back, chronic pain, just started the [facility] program with [facility].   Recent falls: None    Patient's Distress Score today was 3/10 (with 10 being the worst).       OBJECTIVE      Physical Exam  Very pleasant patient.  Unaccompanied  Vitals signs:  There were no vitals filed for this visit.   Constitutional      Appearance:  Well developed and well nourished. No apparent distress.   Pulmonary     Effort:  Normal  Neurological     Mental Status:  Alert and oriented.     Coordination:   Normal  Psychiatric        Mood and Affect: Normal     Thought Content:  Normal       Speech:  Normal     Behavior:  Normal     Judgment:  Normal  Genetics-specific     It is my assessment that the patient is ready to proceed with cancer-genetic testing from a psychosocial perspective.    COUNSELING      Germline cancer-genetic testing is the testing of genes associated with cancer, known as cancer susceptibility genes.  Just as these genes are inherited from parents, mutations in these genes can be inherited, as well.  A mutation in a cancer susceptibility gene adversely affects the gene's ability to prevent cancer; therefore, carriers of cancer susceptibility gene mutations may be at increased risk for certain cancers.    A small percentage of cancers are caused by an cancer susceptibility gene mutation, meaning the cancer is genetic/hereditary; rather, most cancers are sporadic.  Causes of sporadic cancers may include environmental risk factors, lifestyle risk factors, and non-modifiable risk factors.  It is important to note that members of a family often share not only their genetics but also risk factors including environmental and lifestyle risk factors.    Results of genetic testing include positive, negative, and variant of unknown significance (VUS).  A positive result indicates the presence of at least one clinically significant mutation, and the patient's specific cancer risks vary depending upon the tumor-suppressor gene(s) in which there is/are a mutation(s).  With a positive result, in some cases, depending upon the specific result and the patient's clinical history, modified management may be recommended, including measures for risk reduction and/or surveillance; however, modified management is not always an option.  A negative result indicates that no clinically significant mutations were identified in the gene(s) tested.  A VUS indicates that there is not presently enough data for the laboratory to  make a determination as to whether the variant is clinically significant; VUSs are not typically acted upon clinically.      The ability to interpret the meaning of a negative genetic testing result in genes associated with cancer with which the patient has not personally been affected, when done prior to testing the appropriate affected relative(s), is significantly limited.  A negative result in the patient does not indicate that she cannot develop cancer, and, in fact, the patient may even be at increased risk for cancer based on shared risk factors with affected relatives.  The most informative candidates for initial genetic testing in a family are those who have been affected with cancer.    Modified management may also be recommended, even with a result of no or unknown significance, based upon risk assessment that incorporates the family history.      Sometimes, depending upon the genetic testing result and the cancer diagnosis, additional/modified treatments may be an option, though this is not guaranteed.    If Hope tests positive for a mutation, her first-degree relatives would each have a 50% chance of having the same mutation, and other, more distantly related blood-relatives would also be at risk of having the same mutation.       The Genetic Information Nondiscrimination Act (MONISHA) is U[place]. federal legislation that provides some protections against use of an individual's genetic information by their health insurer and by their employer.  Title I of MONISHA prohibits most health insurers from utilizing an individual's genetic information to make decisions regarding insurance eligibility, coverage, underwriting, or premium charges.  Title II of MONISHA prohibits covered entities, which include employers, employment agencies, labor organizations, and joint labor-management training and apprenticeship programs, from requesting, requiring, or disclosing the genetic information of employees and applicants.  MONISHA  also prohibits health insurers and employers from asking or mandating that an individual take a genetic test.  MONISHA does not protect individuals from genetic discrimination toward health insurance obtained through a job with the  or through the Federal Employees Health Benefits Plan; from genetic discrimination by employers with fewer than 15 employees; or from genetic discrimination by any other type of policies/entities, including but not limited to life insurance, disability insurance, long-term care insurance,  benefits, and [facility] benefits.     An outside laboratory would perform the testing after a blood sample is collected here at the [facility] or a saliva sample is collected by the patient at home.  With genetic testing, there is a potential for the patient to incur out-of-pocket costs.  Results can take several weeks.  Post-test genetic counseling can be conducted once the genetic testing results are available.     Questions were encouraged and answered to the patient's satisfaction, and she verbalized understanding of information and agreement with the plan.       ASSESSMENT/PLAN      A cancer-genetic evaluation and pre-test genetic counseling were conducted. Based on the information provided by Shona, her personal and/or family history is suggestive of a potential potential hereditary predisposition to cancer. The recommendation is to proceed with germline testing to include the genes associated with hereditary breast cancer (ROBI, BARD1, BRCA1, BRCA2, CDH1, CHEK2, NF1, PALB2, PTEN, RAD51C, RAD51D, STK11, TP53), pancreatic cancer (ROBI, BRCA1, BRCA2, CDKN2A, EPCAM, MLH1, MSH2, MSH6, PALB2, PMS2, PRSS1, STK11, TP53) and colorectal cancer (APC, AXIN2, BMPR1A, CHEK2, EPCAM, GREM1, MLH1, MSH2, MSH3, MSH6, MUTYH, NTHL1, PMS2, POLD1, POLE, PTEN, RNF43, SMAD4, STK11, TP53). Shona was given the option of proceeding with testing now, deferring testing to a later date, or  declining testing. Hope desires to proceed with germline cancer-genetic testing at this time.      Genetic test: [facility] Common Hereditary Cancers +RNA Panel, 47 genes ([id number])  Specimen type: blood   Collection: By [facility] today.    Consent: The patient has provided her written informed consent.    Results are expected approximately 2-3 weeks after the genetic testing laboratory receives the specimen.      Encounter for non-procreative genetic counseling  Family history breast cancer (Z80.3), pancreatic cancer (Z80.0), colon polyps and colon cancer (Z80.0)  1. Proceed with germline genetic testing.  2. Follow up with results.    Approximately 60 minutes were spent face-to-face with the patient.  Approximately 75 minutes in total were spent on this encounter, which includes face-to-face time and non-face-to-face time preparing to see the patient (e.g., review of tests), obtaining and/or reviewing separately obtained history, documenting clinical information in the electronic or other health record, independently interpreting results (not separately reported) and communicating results to the patient/family/caregiver, or care coordination (not separately reported).     REFERENCES     1. National Comprehensive Cancer Network (NCCN). (2021). Genetic/familial high-risk assessment: Breast, ovarian, and pancreatic. NCCN Clinical Practice Guidelines in Oncology (NCCN Guidelines), Version 1.2022.  2. National Comprehensive Cancer Network (NCCN). (2021). Genetic/familial high-risk assessment: Colorectal. NCCN Clinical Practice Guidelines in Oncology (NCCN Guidelines), Version 1.2021.    Sonia Dutton PA-C, MPAS, PA-C  Physician Assistant, Hereditary/High Risk Clinic  Hematology/Oncology, [facility]

## 2022-02-21 ENCOUNTER — PATIENT MESSAGE (OUTPATIENT)
Dept: OBSTETRICS AND GYNECOLOGY | Facility: CLINIC | Age: 52
End: 2022-02-21
Payer: MEDICARE

## 2022-02-22 ENCOUNTER — HOSPITAL ENCOUNTER (OUTPATIENT)
Dept: RADIOLOGY | Facility: HOSPITAL | Age: 52
Discharge: HOME | End: 2022-02-22
Attending: OBSTETRICS & GYNECOLOGY
Payer: MEDICARE

## 2022-02-22 VITALS — BODY MASS INDEX: 38.51 KG/M2 | HEIGHT: 59 IN | WEIGHT: 191 LBS

## 2022-02-22 DIAGNOSIS — Z12.31 VISIT FOR SCREENING MAMMOGRAM: ICD-10-CM

## 2022-02-22 PROCEDURE — 77067 MAMMO DIGITAL SCREENING BILAT WITH TOMO: ICD-10-PCS | Mod: 26,,, | Performed by: RADIOLOGY

## 2022-02-22 PROCEDURE — 77067 SCR MAMMO BI INCL CAD: CPT | Mod: TC

## 2022-02-22 PROCEDURE — 77063 BREAST TOMOSYNTHESIS BI: CPT | Mod: TC

## 2022-02-22 PROCEDURE — 77063 MAMMO DIGITAL SCREENING BILAT WITH TOMO: ICD-10-PCS | Mod: 26,,, | Performed by: RADIOLOGY

## 2022-02-22 PROCEDURE — 77067 SCR MAMMO BI INCL CAD: CPT | Mod: 26,,, | Performed by: RADIOLOGY

## 2022-02-22 PROCEDURE — 77063 BREAST TOMOSYNTHESIS BI: CPT | Mod: 26,,, | Performed by: RADIOLOGY

## 2022-03-03 ENCOUNTER — CLINICAL SUPPORT (OUTPATIENT)
Dept: REHABILITATION | Facility: OTHER | Age: 52
End: 2022-03-03
Attending: INTERNAL MEDICINE
Payer: MEDICARE

## 2022-03-03 DIAGNOSIS — M25.69 DECREASED ROM OF TRUNK AND BACK: Primary | ICD-10-CM

## 2022-03-03 DIAGNOSIS — R29.898 DECREASED STRENGTH OF TRUNK AND BACK: ICD-10-CM

## 2022-03-03 PROCEDURE — 97110 THERAPEUTIC EXERCISES: CPT

## 2022-03-03 NOTE — PROGRESS NOTES
[facility]      Name: Rosa Lau  Clinic Number: [id number]    Therapy Diagnosis:   Encounter Diagnoses   Name Primary?    Decreased ROM of trunk and back Yes    Decreased strength of trunk and back      Physician: Shavon Price MD    Visit Date: 3/3/2022     Physician Orders: PT Eval and Treat   Medical Diagnosis from Referral: DDD (degenerative disc disease), lumbar  Evaluation Date: 2/10/2022  Authorization Period Expiration: M51.36 (ICD-10-CM) - DDD (degenerative disc disease), lumbar  Plan of Care Expiration: 5/10/2022  Visit # / Visits authorized: 2     (jennifer in [employer])     Time In: 0950  Time Out: 1040  Total Billable Time: 50 minutes     Precautions: Standard     Pattern of pain determined: 1 (instability)    Subjective   Rosa returns to PT and reports 3/10 R sided LBP, no radiation to LE. She has been completing the HEP she reports.     Patient reports tolerating previous visit fine, no new complaints.    Pain:  Current 3/10, worst 8/10, best 3/10   Location: right back  and buttocks   Description: Aching, Grabbing and Tight    Occupation: Disability since 2018  Leisure: Hanging out at home, watching TV    Pts goals: Be able to walk 30 minutes with only minimal low back pain.    Objective       Baseline Isometric Testing on Med [employer] equipment: Testing administered by PT  Date of testin/10/2022  ROM 6-36 deg   Max Peak Torque 168    Min Peak Torque 66    Flex/Ext Ratio 2.8:1   % below normative data 11   *24% below at 0 degrees, also likely excess leg push at 36 degrees contributing to large ration        Limitation/Restriction for FOTO 2/10/2022 Survey     Therapist reviewed FOTO scores for Rosa Lau on 2/10/2022.   FOTO documents entered into [employer] - see Media section.     Limitation Score: 48%  Goal score: 30%          Treatment    Pt was instructed in and performed the following:     Rosa received therapeutic exercises to develop/improved  "posture, cardiovascular endurance, muscular endurance, lumbar/cervical ROM, strength and muscular endurance for 50 minutes including the following exercises:     DKTC 10x5"  LTR 10x5"  PPT 10x5"  SOC x10 reps seated (Slouching increased slight R side LBP)    [facility] 3/3/2022   Visit Number 2   VAS Pain Rating 0   Recumbent Bike Seat Pos. 5   Time 5   Lumbar Stretches - Slouch Overcorrection 10   Flexion in Lying 10   Lumbar Weight 60   Repetitions 15   Rating of Perceived Exertion 6   Ice - Z Lie (in min.) 5         Peripheral muscle strengthening which included 1 set of 15-20 repetitions at a slow, controlled 10-13 second per rep pace focused on strengthening supporting musculature for improved body mechanics and functional mobility.  Pt and therapist focused on proper form during treatment to ensure optimal strengthening of each targeted muscle group.  Machines were utilized including torso rotation, leg extension, leg curl, chest press, upright row. Tricep extension, bicep curl, leg press, and hip abduction added visit 3    Rosa received the following manual therapy techniques: 00 Minutes    Home Exercises Provided and Patient Education Provided   Home exercises include: See pt instruction from evaluating PT  Cardio program: visit 5  Lifting education date: Visit 11  Lumbar roll:  NA    Education provided:   Written Home Exercises Provided: Patient instructed to cont prior HEP.  Exercises were reviewed and Shona was able to demonstrate them prior to the end of the session.  Shona demonstrated good  understanding of the education provided.     See EMR under Patient Instructions for exercises provided prior visit.      Assessment     Shona returned to PT for visit 2 f/u without irritation from testing. She displayed fair to good performance of HEP. Lumbar weight was initiated at 60# today. Patient reported no LBP upon set up in Spinback machine. She was instructed to complete 15 reps today, which she rated " at RPE of  6/10. Progress per tolerance.     Patient is making progress towards established goals.  Pt will continue to benefit from skilled outpatient physical therapy to address the deficits stated in the impairment chart, provide pt/family education and to maximize pt's level of independence in the home and community environment.     Anticipated Barriers for therapy: None  Pt's spiritual, cultural and educational needs considered and pt agreeable to plan of care and goals as stated below:     GOALS: Pt is in agreement with the following goals.     Short term goals:  6 weeks or 10 visits   1.  Pt will demonstrate increased lumbar ROM by at least 9 degrees from the initial ROM value with improvements noted in functional ROM and ability to perform ADLs.  (approp and ongoing)  2.  Pt will demonstrate increased [employer] average isometric strength value  by 25% from initial test resulting in improved ability to perform bending, lifting, and carrying activities safely, confidently.  (approp and ongoing)  3.  Patient report a reduction in worst pain score by 1-2 points for improved tolerance for standing and cooking in her kitchen.  (approp and ongoing)  4.  Pt able to perform HEP correctly with minimal cueing or supervision from therapist to encourage independent management of symptoms. (approp and ongoing)        Long term goals: 10 weeks or 20 visits   1. Pt will demonstrate increased lumbar ROM by at least 18 degrees from initial ROM value, resulting in improved ability to perform functional fwd bending while standing and sitting. (approp and ongoing)  2. Pt will demonstrate increased [employer] average isometric strength value  by 50% from initial test resulting in improved ability to perform bending, lifting, and carrying activities safely, confidently.  (approp and ongoing)  3. Pt to demonstrate ability to independently control and reduce their pain through posture positioning and mechanical movements throughout a typical  day.  (approp and ongoing)  4.  Pt will demonstrate reduced pain and improved functional outcomes as reported on the FOTO by reaching a limitation score of < or = 30% or less in order to demonstrate subjective improvement in pt's condition.    (approp and ongoing)  5. Pt will demonstrate independence with the HEP at discharge  (approp and ongoing)  6.  Patient will report being able to stand for at least 30 minutes with no greater than 2/10 low back pain  (approp and ongoing)    Plan   Continue with established Plan of Care towards established PT goals.

## 2022-03-07 ENCOUNTER — DOCUMENTATION ONLY (OUTPATIENT)
Dept: REHABILITATION | Facility: OTHER | Age: 52
End: 2022-03-07
Attending: INTERNAL MEDICINE
Payer: MEDICARE

## 2022-03-07 ENCOUNTER — PATIENT MESSAGE (OUTPATIENT)
Dept: INTERNAL MEDICINE | Facility: CLINIC | Age: 52
End: 2022-03-07
Payer: MEDICARE

## 2022-03-08 ENCOUNTER — CLINICAL SUPPORT (OUTPATIENT)
Dept: REHABILITATION | Facility: OTHER | Age: 52
End: 2022-03-08
Attending: INTERNAL MEDICINE
Payer: MEDICARE

## 2022-03-08 DIAGNOSIS — M25.69 DECREASED ROM OF TRUNK AND BACK: Primary | ICD-10-CM

## 2022-03-08 DIAGNOSIS — R29.898 DECREASED STRENGTH OF TRUNK AND BACK: ICD-10-CM

## 2022-03-08 PROCEDURE — 97110 THERAPEUTIC EXERCISES: CPT | Mod: CQ

## 2022-03-08 NOTE — PROGRESS NOTES
[facility]      Name: Rosa Lau  Clinic Number: [id number]    Therapy Diagnosis:   Encounter Diagnoses   Name Primary?    Decreased ROM of trunk and back Yes    Decreased strength of trunk and back      Physician: Shavon Price MD    Visit Date: 3/8/2022     Physician Orders: PT Eval and Treat   Medical Diagnosis from Referral: DDD (degenerative disc disease), lumbar  Evaluation Date: 2/10/2022  Authorization Period Expiration: M51.36 (ICD-10-CM) - DDD (degenerative disc disease), lumbar  Plan of Care Expiration: 5/10/2022  Visit # / Visits authorized: 3/20     (jennifer in [employer])     Time In: 10:00  Time Out: 10:55  Total Billable Time: 50 minutes     Precautions: Standard     Pattern of pain determined: 1 (instability)    Subjective   Rosa returns reporting an increase in lower back pain and general fatigue after having trouble sleeping last night.    Patient reports tolerating previous visit well with some increase in soreness    Pain:  Current 4.5/10, worst 8/10, best 3/10   Location: right back  and buttocks   Description: Aching, Grabbing and Tight    Occupation: Disability since 2018  Leisure: Hanging out at home, watching TV    Pts goals: Be able to walk 30 minutes with only minimal low back pain.    Objective       Baseline Isometric Testing on Med X equipment: Testing administered by PT  Date of testin/10/2022  ROM 6-36 deg   Max Peak Torque 168    Min Peak Torque 66    Flex/Ext Ratio 2.8:1   % below normative data 11   *24% below at 0 degrees, also likely excess leg push at 36 degrees contributing to large ration        Limitation/Restriction for FOTO 2/10/2022 Survey     Therapist reviewed FOTO scores for Rosa Lau on 2/10/2022.   FOTO documents entered into [employer] - see Media section.     Limitation Score: 48%  Goal score: 30%          Treatment    Pt was instructed in and performed the following:     Rosa received therapeutic exercises to  "develop/improved posture, cardiovascular endurance, muscular endurance, lumbar/cervical ROM, strength and muscular endurance for 50 minutes including the following exercises:     DKTC 10x5"  LTR 10x5"  PPT 10x5"  +Bridges x10  +Clamshells RTB x10  SOC x10 reps seated (Slouching increased slight R side LBP)    [facility] - Short 3/8/2022   Visit Number 3   VAS Pain Rating 4.5   Recumbent Bike - Seat Pos. -   Time 5   Lumbar Stretches - Slouch 10   Flexion in Lying 10   Lumbar Extension - Seat Pad -   Femur Restraint -   Top Dead Center -   Counterweight -   Lumbar Flexion -   Lumbar Extension -   Lumbar Peak Torque -   Lumbar Weight 60   Repetitions 17   Rating of Perceived Exertion 7         Peripheral muscle strengthening which included 1 set of 15-20 repetitions at a slow, controlled 10-13 second per rep pace focused on strengthening supporting musculature for improved body mechanics and functional mobility.  Pt and therapist focused on proper form during treatment to ensure optimal strengthening of each targeted muscle group.  Machines were utilized including torso rotation, leg extension, leg curl, chest press, upright row. Tricep extension, bicep curl, leg press, and hip abduction added visit 3    Rosa received the following manual therapy techniques: 00 Minutes    Home Exercises Provided and Patient Education Provided   Home exercises include: See pt instruction from evaluating PT  Cardio program: visit 5  Lifting education date: Visit 11  Lumbar roll:  NA    Education provided:   Written Home Exercises Provided: Patient instructed to cont prior HEP.  Exercises were reviewed and Hope was able to demonstrate them prior to the end of the session.  Hope demonstrated good  understanding of the education provided.     See EMR under Patient Instructions for exercises provided prior visit.      Assessment   Hope returned reporting an increase in lower back pain and general fatigue following having " difficulty getting to sleep.  Treatment progressed by initiating bridges and clamshells to begin strengthening glute and hip musculature.  Hope tolerated progressions with no increase in pain and appropriate muscle fatigue.  Medx resistance remained at 60#.  She completed 17 reps at a RPE of 7.  Consider decreasing resistance next visit due to pt's high RPE to better challenge muscular endurance.    Patient is making progress towards established goals.  Pt will continue to benefit from skilled outpatient physical therapy to address the deficits stated in the impairment chart, provide pt/family education and to maximize pt's level of independence in the home and community environment.     Anticipated Barriers for therapy: None  Pt's spiritual, cultural and educational needs considered and pt agreeable to plan of care and goals as stated below:     GOALS: Pt is in agreement with the following goals.     Short term goals:  6 weeks or 10 visits   1.  Pt will demonstrate increased lumbar ROM by at least 9 degrees from the initial ROM value with improvements noted in functional ROM and ability to perform ADLs.  (approp and ongoing)  2.  Pt will demonstrate increased MedX average isometric strength value  by 25% from initial test resulting in improved ability to perform bending, lifting, and carrying activities safely, confidently.  (approp and ongoing)  3.  Patient report a reduction in worst pain score by 1-2 points for improved tolerance for standing and cooking in her kitchen.  (approp and ongoing)  4.  Pt able to perform HEP correctly with minimal cueing or supervision from therapist to encourage independent management of symptoms. (approp and ongoing)        Long term goals: 10 weeks or 20 visits   1. Pt will demonstrate increased lumbar ROM by at least 18 degrees from initial ROM value, resulting in improved ability to perform functional fwd bending while standing and sitting. (approp and ongoing)  2. Pt will  demonstrate increased MedX average isometric strength value  by 50% from initial test resulting in improved ability to perform bending, lifting, and carrying activities safely, confidently.  (approp and ongoing)  3. Pt to demonstrate ability to independently control and reduce their pain through posture positioning and mechanical movements throughout a typical day.  (approp and ongoing)  4.  Pt will demonstrate reduced pain and improved functional outcomes as reported on the FOTO by reaching a limitation score of < or = 30% or less in order to demonstrate subjective improvement in pt's condition.    (approp and ongoing)  5. Pt will demonstrate independence with the HEP at discharge  (approp and ongoing)  6.  Patient will report being able to stand for at least 30 minutes with no greater than 2/10 low back pain  (approp and ongoing)    Plan   Continue with established Plan of Care towards established PT goals.

## 2022-03-10 LAB
GENETIC COUNSELING?: YES
GENSO SPECIMEN TYPE: NORMAL
MISCELLANEOUS GENETIC TEST NAME: NORMAL
PARTENTAL OR SIBLING TESTING?: NO
REFERENCE LAB: NORMAL
TEST RESULT: NORMAL

## 2022-03-14 ENCOUNTER — DOCUMENTATION ONLY (OUTPATIENT)
Dept: HEMATOLOGY/ONCOLOGY | Facility: CLINIC | Age: 52
End: 2022-03-14
Payer: MEDICARE

## 2022-03-14 ENCOUNTER — CLINICAL SUPPORT (OUTPATIENT)
Dept: REHABILITATION | Facility: OTHER | Age: 52
End: 2022-03-14
Payer: MEDICARE

## 2022-03-14 NOTE — PROGRESS NOTES
"Hereditary and High Risk Clinic  Department of Hematology and Oncology  [facility]    Cancer Genetics  Results Note    Patient Identification  Name: Rosa Lau ("Shona")  : 1970  MRN: [id number]    GERMLINE GENETIC TESTINGa   Results:   The [employer] Common Hereditary Cancers DNA+RNA Panel analyzes 47 genes associated with hereditary cancer: APC, ROBI, AXIN2, BARD1, BMPR1A, BRCA1, BRCA2, BRIP1, CDH1, CDK4, CDKN2A, CHEK2, CTNNA1, DICER1, EPCAM, GREM1, HOXB13, KIT, MEN1, MLH1, MSH2, MSH3, MSH6, MUTYH, NBN, NF1, NTHL1, PALB2, PDGFRA, PMS2, POLD1, POLE, PTEN, RAD50, RAD51C, RAD51D, SDHA, SDHB, SDHC, SDHD, SMAD4, SMARCA4, STK11, TP53, TSC1, TSC2, VHL.    The test did not identify any genetic variants known to cause cancer.    No pathogenic (harmful) mutations were detected.    A variant of uncertain significance was detected in the BRIP1 gene.         Variant classification:   · Pathogenic variants are known to be harmful and increase the risk for cancer.   · Benign variants are known to be harmless and do not increase the risk for cancer.   · Variants of uncertain significance (VUS) haven't been seen in enough people to know if they are harmful or harmless. Most VUS (approximately 91%) are eventually reclassified as benign, while others are reclassified as pathogenic or likely pathogenic. When the variant is reclassified, [employer] will issue an amended results report and notify the patient and genetics team. No actions are needed for a VUS other than checking in with [employer] periodically to see if it has been reclassified.      Variants of uncertain significance were detected the BRIP1 gene.   The BRIP1 gene is associated with autosomal dominant predisposition to ovarian cancer and autosomal recessive Fanconi anemia.     Discussion:   · Shona has no personal history of cancer.   · Her maternal family is significant for:   · Maternal aunt breast at 40  · Maternal first cousin once removed had " bilateral breast at 30  · Maternal first cousin once removed had colon cancer at 35  · MGGM had pancreatic cancer at 60  · MGM and MGF both  in their 30s, not cancer-related.     Shona's maternal family is suspicious for a hereditary predisposition to breast, colon and pancreatic cancer. In regards to Shona's family history of cancer, her negative genetic testing is considered an uninformative negative, as it is unknown if her relatives with cancer have/had a genetic variant she did not inherit. If possible, Shona's affected relatives should have genetic testing, as this would help to better understand her cancer risk. If any affected relative is  or declines testing, their children could proceed with testing.     Patient notification:   1. Phone call: The Genetics Navigator will phone the patient to notify her of the results and direct her to the results letter in [employer].   2. Results letter: A letter will be sent to the patient via [employer] that contains the test results and recommendations and advises the patient to notify me of any changes to her personal or family history and the genetic testing results of any relatives. Since genetics is an evolving field, she is also advised to check in with me periodically to see if updated testing is indicated.   3. Results report: The Genetics Navigator will ensure that Shona receives a copy of her [facility] results report and that a copy is available in [employer].     DELBERT Mckinley PA-C  Hereditary/High Risk Clinic  Department of Hematology/Oncology  [facility]      CC: Letty Garcia PA-C

## 2022-03-14 NOTE — PROGRESS NOTES
Health  Consult Note    Name: Rosa Nagel [facility] Number: [id number]  Physician: No ref. provider found  Past Medical History:   Diagnosis Date    Bipolar disorder     Hyperlipidemia     Hypertension     Macular degeneration of both eyes      Time In: 2:30 PM  Time Out: 3:00 PM    Health  Agreement signed: Yes- 3/7/22    Coaching performed: Virtual    Subjective:   Patient reports that she had her cycle shortly after we met and had to miss 1 physical therapy session; she did not walk as much as she wanted.  The Pt did close the week with one 10-minute session.  Shona shared how much she enjoys the physical therapy sessions.  They are strenuous but she is able to complete the sessions without any pain.  She felt like she needed to focus on strength to day but realizes that we are hard on ourselves and feels very motivated at this time.  She is very hopeful when it come to this program and is considering starting to walk with her friend Leyda at [facility] ( over the weekend).  Her friend offered to walk with her but she has to think about it because she does not do much around a lot of people since the pandemic.  She is committed to coming to all of PT sessions and walking at least 1x/week between now and her next HC session.      Vision: I want to be able to be active and keep up with my friend; to be able to go on active vacations    Strengths: I am good at tracking, any kind of tracking.  I just started tracking my food using [employer]    Challenges: suffers from depression, it is hard to get or stay motivated when having a hard time    Support: Trip to [place] with my friends to see the brown bears    Hobbies: music, I compose and play    Objective:  Rosa was instructed to jot down exercise sessions on her calendar.  She likes to keep an electronic calendar and likes the idea of adding workouts to that calendar.        INITIAL date 3/14/22  One a scale of 1-10, with 10 being 100% happy, how  would you rate your happiness in each of the wellness areas below?    Happiness:         1     2     3     4     5     6     7     8     9     10    Initial Date: 3/14/22 DC Date: +/- Total Change   Exercise/Movement 1     Physical Health 2     Stress Level 4     Nutrition 7     Sleep 2     Play 3     Body Image 1     Relationships 9     Energy/Vitality 5       Assessment:   Patient is feeling good about starting the healthy back program and is hopeful that she can stay consistent with moving and nutrition goal.      Plan:  Patient goals for next consult include: I will walk at least 1x/week for at least 10-15 minutes.  I will also stretched every day, Monday - Friday    Health : Melisa Rivera  3/14/2022       The patient location is: home  The chief complaint leading to consultation is: follow-up HC session    Visit type: audiovisual    Face to Face time with patient: 30 minutes  30 minutes of total time spent on the encounter, which includes face to face time and non-face to face time preparing to see the patient (eg, review of tests), Obtaining and/or reviewing separately obtained history, Documenting clinical information in the electronic or other health record, Independently interpreting results (not separately reported) and communicating results to the patient/family/caregiver, or Care coordination (not separately reported).     Each patient to whom he or she provides medical services by telemedicine is:  (1) informed of the relationship between the physician and patient and the respective role of any other health care provider with respect to management of the patient; and (2) notified that he or she may decline to receive medical services by telemedicine and may withdraw from such care at any time.    Notes:

## 2022-03-14 NOTE — LETTER
March 14, 2022    Rosa Lau  [address] [address]     Dear Shona,    Below are the results from your recent cancer genetic testing. Please review and let us know if you have any questions or concerns. If you would like to schedule an in-person or a virtual appointment with me to further discuss the results, please message me through your [facility] patient portal or call [phone] to request a post-test genetic counseling appointment.    Letty Garcia PA-C will be notified of your results.  A copy of the [email or web address] results report is available to you in your [facility] medical record and the [employer] patient portal. If you have any difficulty accessing your report, please let our office know so we can mail you a hard copy.    Results  The [email or web address] Common Hereditary Cancers DNA+RNA Panel analyzes 47 genes associated with hereditary cancer: APC, ROBI, AXIN2, BARD1, BMPR1A, BRCA1, BRCA2, BRIP1, CDH1, CDK4, CDKN2A, CHEK2, CTNNA1, DICER1, EPCAM, GREM1, HOXB13, KIT, MEN1, MLH1, MSH2, MSH3, MSH6, MUTYH, NBN, NF1, NTHL1, PALB2, PDGFRA, PMS2, POLD1, POLE, PTEN, RAD50, RAD51C, RAD51D, SDHA, SDHB, SDHC, SDHD, SMAD4, SMARCA4, STK11, TP53, TSC1, TSC2, VHL.    The test did not identify any genetic variants known to cause cancer.    No pathogenic (harmful) mutations were detected.    A variant of uncertain significance (VUS) in the BRIP1 gene was detected.     Variant classification:   · Pathogenic variants are known to be harmful and increase the risk for cancer.   · Benign variants are known to be harmless and do not increase the risk for cancer.   · Variants of uncertain significance (VUS) haven't been seen in enough people to know if they are harmful or harmless. Most VUS (approximately 91%) are eventually reclassified as benign, while others are reclassified as pathogenic or likely pathogenic. When the variant is reclassified, [employer] will issue an amended results report and notify the patient and  genetics team. No actions are needed for a VUS other than checking in with [facility] or your genetics provider periodically to see if it has been reclassified.     The BRIP1 gene is associated with autosomal dominant predisposition to ovarian cancer and autosomal recessive Fanconi anemia.     What do these results mean?  In regards to your family history of cancer, this result is considered an uninformative negative, as it is unknown if your relatives with cancer have/had a genetic variant you did not inherit. If possible, your relatives with a history of cancer should have genetic testing to see if they have a mutation that caused their cancer. If they are  or decline testing, their children could proceed with genetic testing as they have a 50% chance of having inherited any mutation their parent has. The cancers in your family that are suspicious for a hereditary predisposition to cancer are the breast cancer in your aunt Susi Hsieh and your mom's cousin who was diagnosed at age 30, the colon cancer in your mom's cousin at age 40 and the pancreatic cancer in your maternal great grandmother at age 60. People who should have genetic testing would include your aunt Susi Hsieh, the children of your mom's cousins who had breast and colon cancer, and your mother. If your mother doesn't want testing, your brother should consider testing.     Your relatives should speak with a healthcare provider about their cancer risks and whether genetic counseling/testing may be indicated for them.  Anyone interested in pursuing cancer-genetic counseling/testing may contact the [facility] at [phone] to schedule an appointment or visit [email or web address] to locate a local genetic specialist.     Hereditary versus random/sporadic cancer:   Only a small percentage of cancers (5-10%) are hereditary, meaning they are caused by an inherited genetic variant (mutation). The remaining cancers (90-95%) are random or sporadic, caused  often by a combination of risk factors including age, sex, race, physical characteristics and environmental and lifestyle factors (diet, obesity, smoking, lack of exercise, etc). Family members often share these risk factors resulting in multiple individuals with cancer. Even though your test was negative, you may still be at risk for certain cancers based on factors such as family history, genetic causes not evaluated with this test, or other lifestyle and environmental influences.     Cancer screening and risk reduction  It is important that you and your relatives establish care with a primary care provider (PCP), whom you see at least annually for routine health maintenance and guidance on cancer screening and cancer risk reduction. Keep your PCP updated on your personal and family history. If you are not established with a PCP, please contact me so I can submit a referral.     The following cancer screening is recommended:   1. Breast and gynecological cancer screening with your gynecologist.   2. Colonoscopy at the interval recommended by your gastroenterologist. Everyone should have a colonoscopy starting at age 45. Notify your relatives if they haven't started yet.        What you can do to reduce your risk for cancer:   Avoid tobacco use; exercise; maintain a healthy weight; eat a diet rich in fruits, vegetables, and whole grains and low in saturated/trans fat, red meat, and processed meat; limit alcohol consumption; protect against sexually transmitted infections; protect against sun exposure, avoid tanning beds; and get regular cancer screenings as recommended by your healthcare team.    Follow-up    Please update me through [employer] with any changes to your personal or family history and with any relative's genetic testing results. I'm happy to review their results to determine how they might affect you and other family members. Genetics is an evolving field, so I invite you to contact me periodically to  determine if any updated genetic testing is recommended for you or your relatives.     Sincerely,  DELBERT Mckinley, PA-C  Physician Assistant, Hereditary/High Risk Clinic  [facility]    Phone:  [phone]

## 2022-03-14 NOTE — PROGRESS NOTES
Health  Consult Note    Name: Rosa Nagel [facility] Number: [id number]  Physician: Shavon Price MD  Past Medical History:   Diagnosis Date    Bipolar disorder     Hyperlipidemia     Hypertension     Macular degeneration of both eyes      Time In: 1:30 PM  Time Out: 2:30 PM    Health  Agreement signed: Yes- 3/7/22    Coaching performed: in person    Subjective:   Patient reports that she is a vegetarian and does not eat bad/unhealthy food.  She is an emotional eater and has trouble with controlling food portions.  She feels like she has weak muscles and has broken her right ankles 3 times and had surgery on it once.  The Pt wants to take a very active trip to [place] with her friends, which is motivating her to move more and get stronger.  Currently she is not working and wants to start playing her new keyboard.  She wants to set up some kind of exercise program and continue using food tracker.  Shona likes to eat when she is bored or depressed.      Vision: I want to be able to be active and keep up with my friend; to be able to go on active vacations    Strengths: I am good at tracking, any kind of tracking.  I just started tracking my food using [employer]    Challenges: suffers from depression, it is hard to get or stay motivated when having a hard time    Support: Trip to [place] with my friends to see the brown [employer]    Hobbies: music, I compose and play    Objective:  Rosa was instructed to jot down exercise sessions on her calendar.  She likes to keep an electronic calendar and likes the idea of adding workouts to that calendar.        INITIAL date 3/14/22  One a scale of 1-10, with 10 being 100% happy, how would you rate your happiness in each of the wellness areas below?    Happiness:         1     2     3     4     5     6     7     8     9     10    Initial Date: 3/14/22 DC Date: +/- Total Change   Exercise/Movement 1     Physical Health 2     Stress Level 4     Nutrition 7     Sleep 2      Play 3     Body Image 1     Relationships 9     Energy/Vitality 5       Assessment:   Patient is feeling good about starting the healthy back program and is hopeful that she can stay consistent with moving and nutrition goal.      Plan:  Patient goals for next consult include: I will walk at least 1x/week for at least 10-15 minutes.      Health : Melisa Rivera  3/14/2022

## 2022-03-15 ENCOUNTER — PES CALL (OUTPATIENT)
Dept: ADMINISTRATIVE | Facility: CLINIC | Age: 52
End: 2022-03-15
Payer: MEDICARE

## 2022-03-15 ENCOUNTER — CLINICAL SUPPORT (OUTPATIENT)
Dept: REHABILITATION | Facility: OTHER | Age: 52
End: 2022-03-15
Attending: INTERNAL MEDICINE
Payer: MEDICARE

## 2022-03-15 DIAGNOSIS — M25.69 DECREASED ROM OF TRUNK AND BACK: Primary | ICD-10-CM

## 2022-03-15 DIAGNOSIS — R29.898 DECREASED STRENGTH OF TRUNK AND BACK: ICD-10-CM

## 2022-03-15 PROCEDURE — 97110 THERAPEUTIC EXERCISES: CPT

## 2022-03-15 NOTE — PROGRESS NOTES
[facility]      Name: Rosa Lau  Clinic Number: [id number]    Therapy Diagnosis:   Encounter Diagnoses   Name Primary?    Decreased ROM of trunk and back Yes    Decreased strength of trunk and back      Physician: Shavon Price MD    Visit Date: 3/15/2022     Physician Orders: PT Eval and Treat   Medical Diagnosis from Referral: DDD (degenerative disc disease), lumbar  Evaluation Date: 2/10/2022  Authorization Period Expiration: M51.36 (ICD-10-CM) - DDD (degenerative disc disease), lumbar  Plan of Care Expiration: 5/10/2022  Visit # / Visits authorized:   (jennifer in MEDX)        Time In: 10:15 am  Time Out: 11:00 am   Total Billable Time: 45 minutes     Precautions: Standard     Pattern of pain determined: 1 (instability)    Subjective   Rosa  reports she is in [facility] and began a walking goal.  Pt note easy pace 15 min walk resulting no inc LBP but rather her stamina limiting.  Over the weekend, pt prepared her living room space for HEP performance and reports daily compliance.       Patient reports tolerating previous visit well with no complaints of increased soreness  Patient reports their pain to be 2/10 on a 0-10 scale with 0 being no pain and 10 being the worst pain imaginable.  Pain Location: right back and buttocks      Occupation: Disability since 2018  Leisure: Hanging out at home, watching TV    Pts goals: Be able to walk 30 minutes with only minimal low back pain.    Objective       Baseline Isometric Testing on Med X equipment: Testing administered by PT  Date of testin/10/2022  ROM 6-36 deg   Max Peak Torque 168    Min Peak Torque 66    Flex/Ext Ratio 2.8:1   % below normative data 11   *24% below at 0 degrees, also likely excess leg push at 36 degrees contributing to large ration        Limitation/Restriction for FOTO 2/10/2022 Survey     Therapist reviewed FOTO scores for Rosa Lau on 2/10/2022.   FOTO documents entered  "into [employer] - see Media section.     Limitation Score: 48%  Goal score: 30%          Treatment    Pt was instructed in and performed the following:     Rosa received therapeutic exercises to develop/improved posture, cardiovascular endurance, muscular endurance, lumbar/cervical ROM, strength and muscular endurance for 45 minutes including the following exercises:     LTR 10x5"  DKTC 10x5" /c towel for dec hip flexor activaiton    PPT 10x5"  Bridges /c PPT x10  Clamshells RTB x10  SOC x 20     [facility] 3/15/2022   Visit Number 4   VAS Pain Rating 2   Recumbent Bike Seat Pos. -   Time 5   Lumbar Stretches - Slouch Overcorrection 20   Flexion in Lying 10   Lumbar Extension Seat Pad -   Femur Restraint -   Top Dead Center -   Counterweight -   Lumbar Flexion 39   Lumbar Extension 3   Lumbar Peak Torque -   Min Torque -   Test Percent Below Normative Data -   Lumbar Weight 55   Repetitions 20   Rating of Perceived Exertion 6   Ice - Z Lie (in min.) 5       Peripheral muscle strengthening which included 1 set of 15-20 repetitions at a slow, controlled 10-13 second per rep pace focused on strengthening supporting musculature for improved body mechanics and functional mobility.  Pt and therapist focused on proper form during treatment to ensure optimal strengthening of each targeted muscle group.  Machines were utilized including torso rotation, leg extension, leg curl, chest press, upright row. Tricep extension, bicep curl, leg press, and hip abduction added visit 3    Rosa received the following manual therapy techniques: 00 Minutes    Home Exercises Provided and Patient Education Provided   Home exercises include:     LTR  DKTC  PPT   Bridge  Clamshells     Cardio program: visit 5  Lifting education date: Visit 11  Lumbar roll:  Not obtained as of 03/15/22    Education provided:   Written Home Exercises Provided: Patient instructed to cont prior HEP.  Exercises were reviewed and Hope was able to demonstrate " them prior to the end of the session.  Hope demonstrated good  understanding of the education provided.     See EMR under Patient Instructions for exercises provided prior visit.      Assessment     Pt able to complete HEP exercises /c min cue indicating compliance. PPT need cue for dec BLE use, however, core activation improve when PT lift legs indicating some improved core motor control. Pt advised to focus on dec BLE use during HEP.  Considering high RPE last visit, Lumbar MedX weight dec for improved reps and exercise tolerance.  However, pt ext and flex range inc for mobility challenge. Today pt perform 20 reps at 6 RPE indicating improved mobility.  Pt /c SOB /p MedX indicating most limited by deconditioning.  Pt advised to cont walking program per Health Coaching to address noted fatigue /c activity.          Patient is making fair progress towards established goals.  Pt will continue to benefit from skilled outpatient physical therapy to address the deficits stated in the impairment chart, provide pt/family education and to maximize pt's level of independence in the home and community environment.     Anticipated Barriers for therapy: None  Pt's spiritual, cultural and educational needs considered and pt agreeable to plan of care and goals as stated below:     GOALS: Pt is in agreement with the following goals.     Short term goals:  6 weeks or 10 visits   1.  Pt will demonstrate increased lumbar ROM by at least 9 degrees from the initial ROM value with improvements noted in functional ROM and ability to perform ADLs.  (approp and ongoing)  2.  Pt will demonstrate increased MedX average isometric strength value  by 25% from initial test resulting in improved ability to perform bending, lifting, and carrying activities safely, confidently.  (approp and ongoing)  3.  Patient report a reduction in worst pain score by 1-2 points for improved tolerance for standing and cooking in her kitchen.  (approp and  ongoing)  4.  Pt able to perform HEP correctly with minimal cueing or supervision from therapist to encourage independent management of symptoms. (approp and ongoing)        Long term goals: 10 weeks or 20 visits   1. Pt will demonstrate increased lumbar ROM by at least 18 degrees from initial ROM value, resulting in improved ability to perform functional fwd bending while standing and sitting. (approp and ongoing)  2. Pt will demonstrate increased MedX average isometric strength value  by 50% from initial test resulting in improved ability to perform bending, lifting, and carrying activities safely, confidently.  (approp and ongoing)  3. Pt to demonstrate ability to independently control and reduce their pain through posture positioning and mechanical movements throughout a typical day.  (approp and ongoing)  4.  Pt will demonstrate reduced pain and improved functional outcomes as reported on the FOTO by reaching a limitation score of < or = 30% or less in order to demonstrate subjective improvement in pt's condition.    (approp and ongoing)  5. Pt will demonstrate independence with the HEP at discharge  (approp and ongoing)  6.  Patient will report being able to stand for at least 30 minutes with no greater than 2/10 low back pain  (approp and ongoing)    Plan   Continue with established Plan of Care towards established PT goals.

## 2022-03-16 ENCOUNTER — TELEPHONE (OUTPATIENT)
Dept: HEMATOLOGY/ONCOLOGY | Facility: CLINIC | Age: 52
End: 2022-03-16
Payer: MEDICARE

## 2022-03-16 NOTE — TELEPHONE ENCOUNTER
Attempted to call and review the results, she did not answer. Left a brief message with my direct call back number.    ----- Message from Sonia Dutton PA-C sent at 3/14/2022  5:15 PM CDT -----  Regarding: Results release: JUMA Adler,    Please phone patient and notify her that:  1. 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   2. The results letter with recommendations has been sent to her via [facility] ([facility])    Please document your call and scan the results report into Epic.     Thank you,  Sonia

## 2022-03-17 ENCOUNTER — CLINICAL SUPPORT (OUTPATIENT)
Dept: REHABILITATION | Facility: OTHER | Age: 52
End: 2022-03-17
Attending: INTERNAL MEDICINE
Payer: MEDICARE

## 2022-03-17 DIAGNOSIS — R29.898 DECREASED STRENGTH OF TRUNK AND BACK: ICD-10-CM

## 2022-03-17 DIAGNOSIS — M25.69 DECREASED ROM OF TRUNK AND BACK: Primary | ICD-10-CM

## 2022-03-17 PROCEDURE — 97110 THERAPEUTIC EXERCISES: CPT | Mod: CQ

## 2022-03-17 NOTE — PROGRESS NOTES
[facility]      Name: Rosa Lau  Clinic Number: [id number]    Therapy Diagnosis:   Encounter Diagnoses   Name Primary?    Decreased ROM of trunk and back Yes    Decreased strength of trunk and back      Physician: Shavon Price MD    Visit Date: 3/17/2022     Physician Orders: PT Eval and Treat   Medical Diagnosis from Referral: DDD (degenerative disc disease), lumbar  Evaluation Date: 2/10/2022  Authorization Period Expiration: M51.36 (ICD-10-CM) - DDD (degenerative disc disease), lumbar  Plan of Care Expiration: 5/10/2022  Visit # / Visits authorized:   (jennifer in MEDX)        Time In: 9:50 am ( Patient with late arrival)  Time Out: 10:45 am   Total Billable Time: 45 minutes     Precautions: Standard     Pattern of pain determined: 1 (instability)    Subjective   Rosa  Reports only mild lower back pain.  States that she is looking to begin a walking program with her friend.    Patient reports tolerating previous visit well with no complaints of increased soreness  Patient reports their pain to be 2/10 on a 0-10 scale with 0 being no pain and 10 being the worst pain imaginable.  Pain Location: right back and buttocks      Occupation: Disability since 2018  Leisure: Hanging out at home, watching TV    Pts goals: Be able to walk 30 minutes with only minimal low back pain.    Objective       Baseline Isometric Testing on Med X equipment: Testing administered by PT  Date of testin/10/2022  ROM 6-36 deg   Max Peak Torque 168    Min Peak Torque 66    Flex/Ext Ratio 2.8:1   % below normative data 11   *24% below at 0 degrees, also likely excess leg push at 36 degrees contributing to large ration        Limitation/Restriction for FOTO 2/10/2022 Survey     Therapist reviewed FOTO scores for Rosa Lau on 2/10/2022.   FOTO documents entered into [employer] - see Media section.     Limitation Score: 48%  Goal score: 30%          Treatment    Pt was instructed in  "and performed the following:     Rosa received therapeutic exercises to develop/improved posture, cardiovascular endurance, muscular endurance, lumbar/cervical ROM, strength and muscular endurance for 45 minutes including the following exercises:     LTR 10x5"  DKTC 10x5" /c towel for dec hip flexor activation  PPT 10x5"  Bridges /c PPT + RTB x10  Clamshells RTB x10  SOC x 20     [facility] - Short 3/17/2022   Visit Number 5   VAS Pain Rating 2   Recumbent Bike - Seat Pos. -   Time 5   Lumbar Stretches - Slouch 20   Flexion in Lying 10   Lumbar Extension - Seat Pad -   Femur Restraint -   Top Dead Center -   Counterweight -   Lumbar Flexion -   Lumbar Extension -   Lumbar Peak Torque -   Lumbar Weight 57   Repetitions 15   Rating of Perceived Exertion 6     Peripheral muscle strengthening which included 1 set of 15-20 repetitions at a slow, controlled 10-13 second per rep pace focused on strengthening supporting musculature for improved body mechanics and functional mobility.  Pt and therapist focused on proper form during treatment to ensure optimal strengthening of each targeted muscle group.  Machines were utilized including torso rotation, leg extension, leg curl, chest press, upright row. Tricep extension, bicep curl, leg press, and hip abduction added visit 3    Rosa received the following manual therapy techniques: 00 Minutes    Home Exercises Provided and Patient Education Provided   Home exercises include:     LTR  DKTC  PPT   Bridge  Clamshells     Cardio program: visit 5, Benefits of cardio discussed 3/17/22 , patient states that she is looking to begin a walking program with a friend and also states that she has a recumbent bike she will start using.   Lifting education date: Visit 11  Lumbar roll:  Not obtained as of 03/15/22    Education provided:   Written Home Exercises Provided: Patient instructed to cont prior HEP.  Exercises were reviewed and Hope was able to demonstrate them prior to " the end of the session.  Hope demonstrated good  understanding of the education provided.     See EMR under Patient Instructions for exercises provided prior visit and today's visit    Assessment   Ms. Lau returned reporting mild LBP. Treatment continued with lumbopelvic/core flexibility and strengthening ex's which she was able to perform without increased pain.  Minimal cues provided to avoid valsalva with PPT ex's. She was educated on the benefits of incorporating cardiovascular ex into her daily/weekly routine to which she states that she is looking to begin a walking program with a friend and also states that she has a recumbent bike she will start using.   Lumbar MedX resistance increased to 57# and she was able to complete 15 reps with a RPE = 6/10. Will monitor and attempt to progress per HB protocol and patient tolerance.    Patient is making fair progress towards established goals.  Pt will continue to benefit from skilled outpatient physical therapy to address the deficits stated in the impairment chart, provide pt/family education and to maximize pt's level of independence in the home and community environment.     Anticipated Barriers for therapy: None  Pt's spiritual, cultural and educational needs considered and pt agreeable to plan of care and goals as stated below:     GOALS: Pt is in agreement with the following goals.     Short term goals:  6 weeks or 10 visits   1.  Pt will demonstrate increased lumbar ROM by at least 9 degrees from the initial ROM value with improvements noted in functional ROM and ability to perform ADLs.  (approp and ongoing)  2.  Pt will demonstrate increased MedX average isometric strength value  by 25% from initial test resulting in improved ability to perform bending, lifting, and carrying activities safely, confidently.  (approp and ongoing)  3.  Patient report a reduction in worst pain score by 1-2 points for improved tolerance for standing and cooking in her kitchen.   (approp and ongoing)  4.  Pt able to perform HEP correctly with minimal cueing or supervision from therapist to encourage independent management of symptoms. (approp and ongoing)      Long term goals: 10 weeks or 20 visits   1. Pt will demonstrate increased lumbar ROM by at least 18 degrees from initial ROM value, resulting in improved ability to perform functional fwd bending while standing and sitting. (approp and ongoing)  2. Pt will demonstrate increased MedX average isometric strength value  by 50% from initial test resulting in improved ability to perform bending, lifting, and carrying activities safely, confidently.  (approp and ongoing)  3. Pt to demonstrate ability to independently control and reduce their pain through posture positioning and mechanical movements throughout a typical day.  (approp and ongoing)  4.  Pt will demonstrate reduced pain and improved functional outcomes as reported on the FOTO by reaching a limitation score of < or = 30% or less in order to demonstrate subjective improvement in pt's condition.    (approp and ongoing)  5. Pt will demonstrate independence with the HEP at discharge  (approp and ongoing)  6.  Patient will report being able to stand for at least 30 minutes with no greater than 2/10 low back pain  (approp and ongoing)    Plan   Continue with established Plan of Care towards established PT goals.     Dickson Mayberry, PTA  3/17/2022

## 2022-03-22 ENCOUNTER — CLINICAL SUPPORT (OUTPATIENT)
Dept: REHABILITATION | Facility: OTHER | Age: 52
End: 2022-03-22
Attending: INTERNAL MEDICINE
Payer: MEDICARE

## 2022-03-22 DIAGNOSIS — M25.69 DECREASED ROM OF TRUNK AND BACK: Primary | ICD-10-CM

## 2022-03-22 DIAGNOSIS — R29.898 DECREASED STRENGTH OF TRUNK AND BACK: ICD-10-CM

## 2022-03-22 PROCEDURE — 97110 THERAPEUTIC EXERCISES: CPT | Mod: CQ

## 2022-03-22 NOTE — PROGRESS NOTES
[facility]      Name: Rosa Lau  Clinic Number: [id number]    Therapy Diagnosis:   Encounter Diagnoses   Name Primary?    Decreased ROM of trunk and back Yes    Decreased strength of trunk and back      Physician: Shavon Price MD    Visit Date: 3/22/2022     Physician Orders: PT Eval and Treat   Medical Diagnosis from Referral: DDD (degenerative disc disease), lumbar  Evaluation Date: 2/10/2022  Authorization Period Expiration: M51.36 (ICD-10-CM) - DDD (degenerative disc disease), lumbar  Plan of Care Expiration: 5/10/2022  Visit # / Visits authorized:   (jennifer in MEDX)        Time In: 10:20 am ( Late arrival)  Time Out: 11:15 am   Total Billable Time: 50 minutes     Precautions: Standard     Pattern of pain determined: 1 (instability)    Subjective   Rosa reports that her back is feeling pretty good. She reports min (R) ankle pain which she attributes to the pending inclement weather change (Previous Hx of multiple ankle/foot fractures). She notes improved function at home with her ability to get items off of the floor from a squat position without UE assistance.  ALso states that she was able to perform some of her walking program over the weekend with her friend and also by herself.     Patient reports tolerating previous visit well with no complaints of increased soreness  Patient reports their pain to be 0/10 on a 0-10 scale with 0 being no pain and 10 being the worst pain imaginable.  Pain Location: right back and buttocks      Occupation: Disability since 2018  Leisure: Hanging out at home, watching TV    Pts goals: Be able to walk 30 minutes with only minimal low back pain.    Objective       Baseline Isometric Testing on Med X equipment: Testing administered by PT  Date of testin/10/2022  ROM 6-36 deg   Max Peak Torque 168    Min Peak Torque 66    Flex/Ext Ratio 2.8:1   % below normative data 11   *24% below at 0 degrees, also likely excess  "leg push at 36 degrees contributing to large ration        Limitation/Restriction for FOTO 2/10/2022 Survey     Therapist reviewed FOTO scores for Rosa Lau on 2/10/2022.   FOTO documents entered into [employer] - see Media section.     Limitation Score: 48%  Goal score: 30%          Treatment    Pt was instructed in and performed the following:     Rosa received therapeutic exercises to develop/improved posture, cardiovascular endurance, muscular endurance, lumbar/cervical ROM, strength and muscular endurance for 45 minutes including the following exercises:     LTR 10x5"  DKTC 10x5" /c towel for dec hip flexor activation  PPT 10x5"  Bridges /c PPT + RTB x15  Clamshells RTB x15  SOC x 20     [facility] - Short 3/22/2022   Visit Number 6   VAS Pain Rating 0   Recumbent Bike - Seat Pos. -   Time 5   Lumbar Stretches - Slouch 20   Flexion in Lying 10   Lumbar Extension - Seat Pad -   Femur Restraint -   Top Dead Center -   Counterweight -   Lumbar Flexion -   Lumbar Extension -   Lumbar Peak Torque -   Lumbar Weight 57   Repetitions 16   Rating of Perceived Exertion 5       Peripheral muscle strengthening which included 1 set of 15-20 repetitions at a slow, controlled 10-13 second per rep pace focused on strengthening supporting musculature for improved body mechanics and functional mobility.  Pt and therapist focused on proper form during treatment to ensure optimal strengthening of each targeted muscle group.  Machines were utilized including torso rotation, leg extension, leg curl, chest press, upright row. Tricep extension, bicep curl, leg press, and hip abduction added visit 3    Rosa received the following manual therapy techniques: 00 Minutes    Home Exercises Provided and Patient Education Provided   Home exercises include:     LTR  DKTC  PPT   Bridge  Clamshells     Cardio program: visit 5, Benefits of cardio discussed 3/17/22 , patient states that she is looking to begin a walking program with " a friend and also states that she has a recumbent bike she will start using.   Lifting education date: Visit 11  Lumbar roll:  Not obtained as of 03/15/22    Education provided:   Written Home Exercises Provided: Patient instructed to cont prior HEP.  Exercises were reviewed and Hope was able to demonstrate them prior to the end of the session.  Shona demonstrated good  understanding of the education provided.     See EMR under Patient Instructions for exercises provided prior visit and today's visit    Assessment   Ms. Lau returned noting improved pain control and function at home with ability to retreive objects off of the floor from a squat position without UE assistance. Treatment continued with lumbopelvic/core flexibility and strengthening ex's which she was able to perform without increased pain including progression of reps for Bridging and clamshells.  Lumbar Medx resistance was maintained at 57# and she was able to complete 16 reps with a RPE = 5/10.  Will monitor and attempt to progress per HB protocol and patient tolerance.    Patient is making fair progress towards established goals.  Pt will continue to benefit from skilled outpatient physical therapy to address the deficits stated in the impairment chart, provide pt/family education and to maximize pt's level of independence in the home and community environment.     Anticipated Barriers for therapy: None  Pt's spiritual, cultural and educational needs considered and pt agreeable to plan of care and goals as stated below:     GOALS: Pt is in agreement with the following goals.     Short term goals:  6 weeks or 10 visits   1.  Pt will demonstrate increased lumbar ROM by at least 9 degrees from the initial ROM value with improvements noted in functional ROM and ability to perform ADLs.  (approp and ongoing)  2.  Pt will demonstrate increased MedX average isometric strength value  by 25% from initial test resulting in improved ability to perform  bending, lifting, and carrying activities safely, confidently.  (approp and ongoing)  3.  Patient report a reduction in worst pain score by 1-2 points for improved tolerance for standing and cooking in her kitchen.  (approp and ongoing)  4.  Pt able to perform HEP correctly with minimal cueing or supervision from therapist to encourage independent management of symptoms. (approp and ongoing)      Long term goals: 10 weeks or 20 visits   1. Pt will demonstrate increased lumbar ROM by at least 18 degrees from initial ROM value, resulting in improved ability to perform functional fwd bending while standing and sitting. (approp and ongoing)  2. Pt will demonstrate increased MedX average isometric strength value  by 50% from initial test resulting in improved ability to perform bending, lifting, and carrying activities safely, confidently.  (approp and ongoing)  3. Pt to demonstrate ability to independently control and reduce their pain through posture positioning and mechanical movements throughout a typical day.  (approp and ongoing)  4.  Pt will demonstrate reduced pain and improved functional outcomes as reported on the FOTO by reaching a limitation score of < or = 30% or less in order to demonstrate subjective improvement in pt's condition.    (approp and ongoing)  5. Pt will demonstrate independence with the HEP at discharge  (approp and ongoing)  6.  Patient will report being able to stand for at least 30 minutes with no greater than 2/10 low back pain  (approp and ongoing)    Plan   Continue with established Plan of Care towards established PT goals.     Dickson Mayberry, PTA  3/22/2022

## 2022-03-23 ENCOUNTER — PATIENT MESSAGE (OUTPATIENT)
Dept: HEMATOLOGY/ONCOLOGY | Facility: CLINIC | Age: 52
End: 2022-03-23
Payer: MEDICARE

## 2022-03-28 ENCOUNTER — CLINICAL SUPPORT (OUTPATIENT)
Dept: REHABILITATION | Facility: OTHER | Age: 52
End: 2022-03-28
Attending: INTERNAL MEDICINE
Payer: MEDICARE
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
## 2022-03-31 ENCOUNTER — OFFICE VISIT (OUTPATIENT)
Dept: OBSTETRICS AND GYNECOLOGY | Facility: CLINIC | Age: 52
End: 2022-03-31
Attending: OBSTETRICS & GYNECOLOGY
Payer: MEDICARE

## 2022-03-31 ENCOUNTER — PATIENT MESSAGE (OUTPATIENT)
Dept: OBSTETRICS AND GYNECOLOGY | Facility: CLINIC | Age: 52
End: 2022-03-31

## 2022-03-31 VITALS
DIASTOLIC BLOOD PRESSURE: 80 MMHG | HEIGHT: 59 IN | BODY MASS INDEX: 38.84 KG/M2 | WEIGHT: 192.69 LBS | SYSTOLIC BLOOD PRESSURE: 120 MMHG

## 2022-03-31 DIAGNOSIS — N83.202 LEFT OVARIAN CYST: Primary | ICD-10-CM

## 2022-03-31 DIAGNOSIS — Z01.419 ENCOUNTER FOR GYNECOLOGICAL EXAMINATION (GENERAL) (ROUTINE) WITHOUT ABNORMAL FINDINGS: ICD-10-CM

## 2022-03-31 PROCEDURE — G0101 PR CA SCREEN;PELVIC/BREAST EXAM: ICD-10-PCS | Mod: S$PBB,,, | Performed by: OBSTETRICS & GYNECOLOGY

## 2022-03-31 PROCEDURE — 99999 PR PBB SHADOW E&M-EST. PATIENT-LVL III: CPT | Mod: PBBFAC,,, | Performed by: OBSTETRICS & GYNECOLOGY

## 2022-03-31 PROCEDURE — G0101 CA SCREEN;PELVIC/BREAST EXAM: HCPCS | Mod: S$PBB,,, | Performed by: OBSTETRICS & GYNECOLOGY

## 2022-03-31 PROCEDURE — 99213 OFFICE O/P EST LOW 20 MIN: CPT | Mod: PBBFAC | Performed by: OBSTETRICS & GYNECOLOGY

## 2022-03-31 PROCEDURE — 99999 PR PBB SHADOW E&M-EST. PATIENT-LVL III: ICD-10-PCS | Mod: PBBFAC,,, | Performed by: OBSTETRICS & GYNECOLOGY

## 2022-03-31 RX ORDER — ARMODAFINIL 250 MG/1
250 TABLET ORAL EVERY MORNING
COMMUNITY
Start: 2022-02-08 | End: 2022-12-29

## 2022-03-31 NOTE — PROGRESS NOTES
Subjective:       Patient ID: Rosa Lau is a 52 y.o. female.    Chief Complaint:  Annual Exam (Last pap/hpv  normal , mammo  birads 1)        History of Present Illness  Rosa Lau is a 52 y.o. female  who presents for annual. Overall doing much better with HRT for menopause. Main complaints were joint pain that was debilitation and insomnia. Joint pain is much better and now able to do exercises with [facility]. Still with some insomnia but also has known sleep apnea. Overall happy with meds. Since starting HRT has had 3 periods in 2 months. Also had a left ovarian cyst on CT in 2021 that she was supposed to get a GYN u/s for but did not. Will order GYN u/s. I explained to patient that I am not too concerned with bleeding because labs were perimenopausal when we started HRT and one of the periods was after missing one day of meds/HRT. All questions answered.     Patient's last menstrual period was 10/31/2020 (exact date).   Date of Last Pap: No result found    Review of Systems  Review of Systems   Constitutional: Negative for chills and fever.        Objective:   Physical Exam:   Constitutional: She is oriented to person, place, and time. Vital signs are normal. She appears well-developed and well-nourished. No distress.        Pulmonary/Chest: She exhibits no mass. Right breast exhibits no mass, no nipple discharge, no skin change, no tenderness, no bleeding and no swelling. Left breast exhibits no mass, no nipple discharge, no skin change, no tenderness, no bleeding and no swelling. Breasts are symmetrical.        Abdominal: Soft. Bowel sounds are normal. She exhibits no distension and no mass. There is no abdominal tenderness. There is no rebound.     Genitourinary:    Vagina and uterus normal.   There is no rash, tenderness, lesion or injury on the right labia. There is no rash, tenderness, lesion or injury on the left labia. Cervix is normal. Right adnexum  displays no mass, no tenderness and no fullness. Left adnexum displays no mass, no tenderness and no fullness. No erythema,  no vaginal discharge, tenderness, rectocele, cystocele or unspecified prolapse of vaginal walls in the vagina. Cervix exhibits no motion tenderness, no discharge and no friability. Uterus is not deviated, not enlarged, not fixed, not tender and not hosting fibroids.           Musculoskeletal: Normal range of motion and moves all extremeties.      Lymphadenopathy:        Right: No supraclavicular adenopathy present.        Left: No supraclavicular adenopathy present.    Neurological: She is alert and oriented to person, place, and time.    Skin: Skin is warm and dry.    Psychiatric: She has a normal mood and affect. Her behavior is normal. Judgment normal.        Assessment/ Plan:     1. Left ovarian cyst  US Pelvis Comp with Transvag NON-OB (xpd   2. Encounter for gynecological examination (general) (routine) without abnormal findings         Follow up in about 1 year (around 3/31/2023) for Annual exam.    As of April 1, 2021, the Cures Act has been passed nationally. This new law requires that all doctors progress notes, lab results, pathology reports and radiology reports be released IMMEDIATELY to the patient in the patient portal. That means that the results are released to you at the EXACT same time they are released to me. Therefore, with all of the patients that I have I am not able to reply to each patient exactly when the results come in. So there will be a delay from when you see the results to when I see them and have time to come up with a response to send you. Also I only see these results when I am on the computer at work. So if the results come in over the weekend or after 5 pm of a work day, I will not see them until the next business day. As you can tell, this is a challenge as a physician to give every patient the quick response they hope for and deserve. So please be  patient! Thanks for understanding, Dr. Dumont

## 2022-04-01 ENCOUNTER — OFFICE VISIT (OUTPATIENT)
Dept: DERMATOLOGY | Facility: CLINIC | Age: 52
End: 2022-04-01
Payer: MEDICARE

## 2022-04-01 ENCOUNTER — CLINICAL SUPPORT (OUTPATIENT)
Dept: REHABILITATION | Facility: OTHER | Age: 52
End: 2022-04-01
Attending: INTERNAL MEDICINE
Payer: MEDICARE

## 2022-04-01 DIAGNOSIS — M25.69 DECREASED ROM OF TRUNK AND BACK: Primary | ICD-10-CM

## 2022-04-01 DIAGNOSIS — D18.01 CHERRY ANGIOMA: Primary | ICD-10-CM

## 2022-04-01 DIAGNOSIS — R29.898 DECREASED STRENGTH OF TRUNK AND BACK: ICD-10-CM

## 2022-04-01 PROCEDURE — 99202 OFFICE O/P NEW SF 15 MIN: CPT | Mod: 95,,, | Performed by: DERMATOLOGY

## 2022-04-01 PROCEDURE — 99202 PR OFFICE/OUTPT VISIT, NEW, LEVL II, 15-29 MIN: ICD-10-PCS | Mod: 95,,, | Performed by: DERMATOLOGY

## 2022-04-01 PROCEDURE — 97110 THERAPEUTIC EXERCISES: CPT

## 2022-04-01 NOTE — PROGRESS NOTES
The patient location is: home  The chief complaint leading to consultation is: spot    Visit type: audiovisual    Face to Face time with patient: 20 minutes of total time spent on the encounter, which includes face to face time and non-face to face time preparing to see the patient (eg, review of tests), Obtaining and/or reviewing separately obtained history, Documenting clinical information in the electronic or other health record, Independently interpreting results (not separately reported) and communicating results to the patient/family/caregiver, or Care coordination (not separately reported).     Each patient to whom he or she provides medical services by telemedicine is:  (1) informed of the relationship between the physician and patient and the respective role of any other health care provider with respect to management of the patient; and (2) notified that he or she may decline to receive medical services by telemedicine and may withdraw from such care at any time.      Subjective:       Patient ID:  Rosa Lau is a 52 y.o. female who presents for new mole of concern.    HPI  Noticed this growth approx 1 mo ago on abdomen. It is irregular and looks different than her other spots. She has never had a skin cancer screening. No symptoms.    Review of Systems     Objective:    Physical Exam   Constitutional: She appears well-developed and well-nourished. No distress.   Neurological: She is alert and oriented to person, place, and time. She is not disoriented.   Psychiatric: She has a normal mood and affect.   Skin:                 Diagram Legend     Erythematous scaling macule/papule c/w actinic keratosis       Vascular papule c/w angioma      Pigmented verrucoid papule/plaque c/w seborrheic keratosis      Yellow umbilicated papule c/w sebaceous hyperplasia      Irregularly shaped tan macule c/w lentigo     1-2 mm smooth white papules consistent with Milia      Movable subcutaneous cyst with punctum c/w  epidermal inclusion cyst      Subcutaneous movable cyst c/w pilar cyst      Firm pink to brown papule c/w dermatofibroma      Pedunculated fleshy papule(s) c/w skin tag(s)      Evenly pigmented macule c/w junctional nevus     Mildly variegated pigmented, slightly irregular-bordered macule c/w mildly atypical nevus      Flesh colored to evenly pigmented papule c/w intradermal nevus       Pink pearly papule/plaque c/w basal cell carcinoma      Erythematous hyperkeratotic cursted plaque c/w SCC      Surgical scar with no sign of skin cancer recurrence      Open and closed comedones      Inflammatory papules and pustules      Verrucoid papule consistent consistent with wart     Erythematous eczematous patches and plaques     Dystrophic onycholytic nail with subungual debris c/w onychomycosis     Umbilicated papule    Erythematous-base heme-crusted tan verrucoid plaque consistent with inflamed seborrheic keratosis     Erythematous Silvery Scaling Plaque c/w Psoriasis     See annotation              Assessment / Plan:        Cherry angioma  This is a benign vascular lesion. Reassurance given. No treatment required.     Would regardless advise pt come in for TBSE as she has not had this done before so that I can evaluate this lesion in person.    Staff to contact her on Monday to set this up. Anytime in next 1-2 months.           No follow-ups on file.

## 2022-04-05 ENCOUNTER — CLINICAL SUPPORT (OUTPATIENT)
Dept: REHABILITATION | Facility: OTHER | Age: 52
End: 2022-04-05
Attending: INTERNAL MEDICINE
Payer: MEDICARE

## 2022-04-05 DIAGNOSIS — M25.69 DECREASED ROM OF TRUNK AND BACK: Primary | ICD-10-CM

## 2022-04-05 DIAGNOSIS — R29.898 DECREASED STRENGTH OF TRUNK AND BACK: ICD-10-CM

## 2022-04-05 PROCEDURE — 97110 THERAPEUTIC EXERCISES: CPT | Mod: CQ

## 2022-04-05 NOTE — PROGRESS NOTES
"[facility]      Name: Rosa Lau  Clinic Number: [id number]    Therapy Diagnosis:   Encounter Diagnoses   Name Primary?    Decreased ROM of trunk and back Yes    Decreased strength of trunk and back      Physician: Shavon Price MD    Visit Date: 2022     Physician Orders: PT Eval and Treat   Medical Diagnosis from Referral: DDD (degenerative disc disease), lumbar  Evaluation Date: 2/10/2022  Authorization Period Expiration: M51.36 (ICD-10-CM) - DDD (degenerative disc disease), lumbar  Plan of Care Expiration: 5/10/2022  Visit # / Visits authorized:   (jennifer in MEDX)        Time In: 12:15 pm  Time Out: 1:05  pm   Total Billable Time: 45 minutes     Precautions: Standard     Pattern of pain determined: 1 (instability)    Subjective   Rosa reports increased R sided lower back pain following "cleaning my house over the weekend for house guests".  She reports she has been using heat to attempt to relieve pain.    Patient reports tolerating previous visit well with no complaints of increased soreness  Patient reports their pain to be 4/10 on a 0-10 scale with 0 being no pain and 10 being the worst pain imaginable.  Pain Location: right back and buttocks      Occupation: Disability since 2018  Leisure: Hanging out at home, watching TV    Pts goals: Be able to walk 30 minutes with only minimal low back pain.    Objective       Baseline Isometric Testing on Med X equipment: Testing administered by PT  Date of testin/10/2022  ROM 6-36 deg   Max Peak Torque 168    Min Peak Torque 66    Flex/Ext Ratio 2.8:1   % below normative data 11   *24% below at 0 degrees, also likely excess leg push at 36 degrees contributing to large ration        Limitation/Restriction for FOTO 2/10/2022 Survey     Therapist reviewed FOTO scores for Rosa Lau on 2/10/2022.   FOTO documents entered into [employer] - see Media section.     Limitation Score: 48%  Goal score: 30% " "         Treatment    Pt was instructed in and performed the following:     Rosa received therapeutic exercises to develop/improved posture, cardiovascular endurance, muscular endurance, lumbar/cervical ROM, strength and muscular endurance for 40 minutes including the following exercises:     LTR 10x5"  DKTC x10 5"  +Piriformis stretch 3x20"  PPT 10x5"  Bridges /c PPT x15  PPT + BKFO  x 10 ea c/cues for proper technique/sequencing  SOC x 10     [facility] - Short 4/5/2022   Visit Number 8   VAS Pain Rating 4   Treadmill Time (in min.) -   Recumbent Bike - Seat Pos. -   Time -   Lumbar Stretches - Slouch 10   Flexion in Lying 10   Manual Therapy 5   Lumbar Extension - Seat Pad -   Femur Restraint -   Top Dead Center -   Counterweight -   Lumbar Flexion -   Lumbar Extension -   Lumbar Peak Torque -   Lumbar Weight 57   Repetitions 14   Rating of Perceived Exertion 6       Peripheral muscle strengthening which included 1 set of 15-20 repetitions at a slow, controlled 10-13 second per rep pace focused on strengthening supporting musculature for improved body mechanics and functional mobility.  Pt and therapist focused on proper form during treatment to ensure optimal strengthening of each targeted muscle group.  Machines were utilized including torso rotation, leg extension, leg curl, chest press, upright row. Tricep extension, bicep curl, leg press, and hip abduction added visit 3    Rosa received the following manual therapy techniques: STM w/sarai pain relief cream to R sided lumbar paraspinals for 5 minutes    Home Exercises Provided and Patient Education Provided   Home exercises include:     LTR  DKTC  PPT   Bridge  Clamshells     Cardio program: visit 5, Benefits of cardio discussed 3/17/22 , patient states that she is looking to begin a walking program with a friend and also states that she has a recumbent bike she will start using.   Lifting education date: Visit 11  Lumbar roll:  Not obtained as " "of 03/15/22    Education provided:   Written Home Exercises Provided: Patient instructed to cont prior HEP.  Exercises were reviewed and Shona was able to demonstrate them prior to the end of the session.  Shona demonstrated good  understanding of the education provided.     See EMR under Patient Instructions for exercises provided prior visit and today's visit    Assessment   Shona returns reporting increased R sided lumbar pain following "over doing it" while cleaning her house over the weekend.  Cardio warmup was deferred due pain.  Treatment began with STM with sarai pain cream to R sided lumbar paraspinals to reduce pain and muscle tightness.  Guided lumbopelvic mobility and core/hip/glute exercises were continued with Hope reporting no increase in pain.  Medx resistance remained at 57#.  She completed 14 reps at a RPE of 6/10.  Consider a 5% decrease in resistance next visit in order to complete more reps and further challenge muscular endurance.    Patient is making fair progress towards established goals.  Pt will continue to benefit from skilled outpatient physical therapy to address the deficits stated in the impairment chart, provide pt/family education and to maximize pt's level of independence in the home and community environment.     Anticipated Barriers for therapy: None  Pt's spiritual, cultural and educational needs considered and pt agreeable to plan of care and goals as stated below:     GOALS: Pt is in agreement with the following goals.     Short term goals:  6 weeks or 10 visits   1.  Pt will demonstrate increased lumbar ROM by at least 9 degrees from the initial ROM value with improvements noted in functional ROM and ability to perform ADLs.  (approp and ongoing)  2.  Pt will demonstrate increased MedX average isometric strength value  by 25% from initial test resulting in improved ability to perform bending, lifting, and carrying activities safely, confidently.  (approp and ongoing)  3.  " Patient report a reduction in worst pain score by 1-2 points for improved tolerance for standing and cooking in her kitchen.  (approp and ongoing)  4.  Pt able to perform HEP correctly with minimal cueing or supervision from therapist to encourage independent management of symptoms. (approp and ongoing)      Long term goals: 10 weeks or 20 visits   1. Pt will demonstrate increased lumbar ROM by at least 18 degrees from initial ROM value, resulting in improved ability to perform functional fwd bending while standing and sitting. (approp and ongoing)  2. Pt will demonstrate increased MedX average isometric strength value  by 50% from initial test resulting in improved ability to perform bending, lifting, and carrying activities safely, confidently.  (approp and ongoing)  3. Pt to demonstrate ability to independently control and reduce their pain through posture positioning and mechanical movements throughout a typical day.  (approp and ongoing)  4.  Pt will demonstrate reduced pain and improved functional outcomes as reported on the FOTO by reaching a limitation score of < or = 30% or less in order to demonstrate subjective improvement in pt's condition.    (approp and ongoing)  5. Pt will demonstrate independence with the HEP at discharge  (approp and ongoing)  6.  Patient will report being able to stand for at least 30 minutes with no greater than 2/10 low back pain  (approp and ongoing)    Plan   Continue with established Plan of Care towards established PT goals.     Rodrigue Gorman, PTA  4/5/2022

## 2022-04-07 ENCOUNTER — CLINICAL SUPPORT (OUTPATIENT)
Dept: REHABILITATION | Facility: OTHER | Age: 52
End: 2022-04-07
Attending: INTERNAL MEDICINE
Payer: MEDICARE

## 2022-04-07 DIAGNOSIS — M25.69 DECREASED ROM OF TRUNK AND BACK: Primary | ICD-10-CM

## 2022-04-07 DIAGNOSIS — R29.898 DECREASED STRENGTH OF TRUNK AND BACK: ICD-10-CM

## 2022-04-07 PROCEDURE — 97110 THERAPEUTIC EXERCISES: CPT

## 2022-04-07 NOTE — PROGRESS NOTES
"[facility]      Name: Rosa Lau  Clinic Number: [id number]    Therapy Diagnosis:   Encounter Diagnoses   Name Primary?    Decreased ROM of trunk and back Yes    Decreased strength of trunk and back      Physician: Shavon Price MD    Visit Date: 2022     Physician Orders: PT Eval and Treat   Medical Diagnosis from Referral: DDD (degenerative disc disease), lumbar  Evaluation Date: 2/10/2022  Authorization Period Expiration: M51.36 (ICD-10-CM) - DDD (degenerative disc disease), lumbar  Plan of Care Expiration: 5/10/2022  Visit # / Visits authorized:   (jennifer in MEDX)        Time In: 1245  Time Out: 1345  Total Billable Time: 45 minutes     Precautions: Standard     Pattern of pain determined: 1 (instability)    Subjective   Rosa returns to PT and c/o of 1/10 LBP, "it is doing a lot better than last time I was here".     Patient reports tolerating previous visit well with no complaints of increased soreness  Patient reports their pain to be 1/10 on a 0-10 scale with 0 being no pain and 10 being the worst pain imaginable.  Pain Location: right back and buttocks      Occupation: Disability since 2018  Leisure: Hanging out at home, watching TV    Pts goals: Be able to walk 30 minutes with only minimal low back pain.    Objective       Baseline Isometric Testing on Med X equipment: Testing administered by PT  Date of testin/10/2022  ROM 6-36 deg   Max Peak Torque 168    Min Peak Torque 66    Flex/Ext Ratio 2.8:1   % below normative data 11   *24% below at 0 degrees, also likely excess leg push at 36 degrees contributing to large ration        Limitation/Restriction for FOTO 2/10/2022 Survey     Therapist reviewed FOTO scores for Rosa Lau on 2/10/2022.   FOTO documents entered into Number 100 - see Media section.     Limitation Score: 48%  Goal score: 30%          Treatment    Pt was instructed in and performed the following:     Rosa received " "therapeutic exercises to develop/improved posture, cardiovascular endurance, muscular endurance, lumbar/cervical ROM, strength and muscular endurance for 60 minutes including the following exercises:     Aerobic Exercise: Treadmill at 2.0 mph for 5 minutes    LTR 10x5"  DKTC x10 5"  +Piriformis stretch 3x20"  PPT 10x5"-NP  PPT + Bridges x15  PPT + BKFO with RTB x10  ea c/cues for proper technique/sequencing  SOC x 10     [facility] 4/7/2022   Visit Number 9   VAS Pain Rating 1   Treadmill Time (in min.) 5   Speed 2   Lumbar Stretches - Slouch Overcorrection 10   Flexion in Lying 10   Lumbar Weight 57   Repetitions 19   Rating of Perceived Exertion 6.5   Ice - Z Lie (in min.) 10       Peripheral muscle strengthening which included 1 set of 15-20 repetitions at a slow, controlled 10-13 second per rep pace focused on strengthening supporting musculature for improved body mechanics and functional mobility.  Pt and therapist focused on proper form during treatment to ensure optimal strengthening of each targeted muscle group.  Machines were utilized including torso rotation, leg extension, leg curl, chest press, upright row. Tricep extension, bicep curl, leg press, and hip abduction added visit 3    Rosa received the following manual therapy techniques: STM w/sarai pain relief cream to R sided lumbar paraspinals for 5 minutes    Home Exercises Provided and Patient Education Provided   Home exercises include:     LTR  DKTC  PPT   Bridge  Clamshells     Cardio program: visit 5, Benefits of cardio discussed 3/17/22 , patient states that she is looking to begin a walking program with a friend and also states that she has a recumbent bike she will start using.   Lifting education date: Visit 11  Lumbar roll:  Not obtained as of 03/15/22    Education provided:   Written Home Exercises Provided: Patient instructed to cont prior HEP.  Exercises were reviewed and Hope was able to demonstrate them prior to the end of " the session.  Shona demonstrated good  understanding of the education provided.     See EMR under Patient Instructions for exercises provided prior visit and today's visit    Assessment     Shona returned to PT with decreased LB irritation, at 1/10 pain and no worsening with stretches. She resumed previous lumbar weight of 57# on extension medx machine. PT instructed patient on breathing technique with exercises, to facilitate better performance. She proceeded to completed 19 reps at RPE of 6.5/10. Proceed with midpoint IM strength testing at next visit.     Patient is making fair progress towards established goals.  Pt will continue to benefit from skilled outpatient physical therapy to address the deficits stated in the impairment chart, provide pt/family education and to maximize pt's level of independence in the home and community environment.     Anticipated Barriers for therapy: None  Pt's spiritual, cultural and educational needs considered and pt agreeable to plan of care and goals as stated below:     GOALS: Pt is in agreement with the following goals.     Short term goals:  6 weeks or 10 visits   1.  Pt will demonstrate increased lumbar ROM by at least 9 degrees from the initial ROM value with improvements noted in functional ROM and ability to perform ADLs.  (approp and ongoing)  2.  Pt will demonstrate increased MedX average isometric strength value  by 25% from initial test resulting in improved ability to perform bending, lifting, and carrying activities safely, confidently.  (approp and ongoing)  3.  Patient report a reduction in worst pain score by 1-2 points for improved tolerance for standing and cooking in her kitchen.  (approp and ongoing)  4.  Pt able to perform HEP correctly with minimal cueing or supervision from therapist to encourage independent management of symptoms. (approp and ongoing)      Long term goals: 10 weeks or 20 visits   1. Pt will demonstrate increased lumbar ROM by at least 18  degrees from initial ROM value, resulting in improved ability to perform functional fwd bending while standing and sitting. (approp and ongoing)  2. Pt will demonstrate increased MedX average isometric strength value  by 50% from initial test resulting in improved ability to perform bending, lifting, and carrying activities safely, confidently.  (approp and ongoing)  3. Pt to demonstrate ability to independently control and reduce their pain through posture positioning and mechanical movements throughout a typical day.  (approp and ongoing)  4.  Pt will demonstrate reduced pain and improved functional outcomes as reported on the FOTO by reaching a limitation score of < or = 30% or less in order to demonstrate subjective improvement in pt's condition.    (approp and ongoing)  5. Pt will demonstrate independence with the HEP at discharge  (approp and ongoing)  6.  Patient will report being able to stand for at least 30 minutes with no greater than 2/10 low back pain  (approp and ongoing)    Plan   Continue with established Plan of Care towards established PT goals.     Eliza Colón, PT  4/7/2022

## 2022-04-11 ENCOUNTER — CLINICAL SUPPORT (OUTPATIENT)
Dept: REHABILITATION | Facility: OTHER | Age: 52
End: 2022-04-11
Attending: INTERNAL MEDICINE
Payer: MEDICARE

## 2022-04-11 NOTE — PROGRESS NOTES
Health  Consult Note    Name: Rosa Nagel [facility] Number: [id number]  Physician: Shavon Price MD  Past Medical History:   Diagnosis Date    Bipolar disorder     Genetic testing 02/16/2022    BRIP1 variant of uncertain significance, 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  She cleaned the entire house in two days and was not able to do much afterwards. She came in to physical therapy and they massaged the area in pain and this has helped.  The Pt was able to do all machines except warm-up machines.  This past Thursday she forgot to put inserts in her new shoes and her left knee was hurting right after completing physical therapy.  She is still feeling some pain on the knee but much better today.  She will start making a list of all the little things she can do around her house.  The Pt stretched at 5 times per week and would like to start stretching daily.  She has been eating good and has not stepped on a scale since she started healthy back; she has lost 5# and can now wear her wedding ring.  She is feeling good about things and her wife is also getting motivated by her changes.   Shona will be able to book her trip to [place] on January 2023 and will have until July of 2023 to be physically prepared to go on the trip.  The Pt is hoping to be in better shape by then.  We talked about how it would be possible if she stays consistent with her plan.      Vision: I want to be able to be active and keep up with my friend; to be able to go on active vacations    Strengths: I am good at tracking, any kind of tracking.  I just started tracking my food using [employer]    Challenges: suffers from depression, it is  hard to get or stay motivated when having a hard time    Support: Trip to [place] with my friends to see the brown bears    Hobbies: music, I compose and play    Objective:  Rosa was instructed to jot down exercise sessions on her calendar.  She likes to keep an electronic calendar and likes the idea of adding workouts to that calendar.        INITIAL date 3/14/22  One a scale of 1-10, with 10 being 100% happy, how would you rate your happiness in each of the wellness areas below?    Happiness:         1     2     3     4     5     6     7     8     9     10    Initial Date: 3/14/22 DC Date: +/- Total Change   Exercise/Movement 1     Physical Health 2     Stress Level 4     Nutrition 7     Sleep 2     Play 3     Body Image 1     Relationships 9     Energy/Vitality 5       Assessment:   Patient is feeling good about starting the healthy back program and is hopeful that she can stay consistent with moving and nutrition goal.      Plan:  Patient goals for next consult include: I will walk at least 2x/week for at least 10-15 minutes.  I will also stretched every day, Monday - Sunday.  I will get on the recumbent bike if the weather is bad (5-8 minutes).   The Pt will talk to therapist about another stretch that could be helpful for her.      Health : Melisa Rivrea  4/11/2022       The patient location is: home  The chief complaint leading to consultation is: follow-up HC session    Visit type: audiovisual    Face to Face time with patient: 30 minutes  30 minutes of total time spent on the encounter, which includes face to face time and non-face to face time preparing to see the patient (eg, review of tests), Obtaining and/or reviewing separately obtained history, Documenting clinical information in the electronic or other health record, Independently interpreting results (not separately reported) and communicating results to the patient/family/caregiver, or Care coordination (not separately reported).     Each  patient to whom he or she provides medical services by telemedicine is:  (1) informed of the relationship between the physician and patient and the respective role of any other health care provider with respect to management of the patient; and (2) notified that he or she may decline to receive medical services by telemedicine and may withdraw from such care at any time.    Notes:

## 2022-04-12 ENCOUNTER — CLINICAL SUPPORT (OUTPATIENT)
Dept: REHABILITATION | Facility: OTHER | Age: 52
End: 2022-04-12
Attending: INTERNAL MEDICINE
Payer: MEDICARE

## 2022-04-12 DIAGNOSIS — M25.69 DECREASED ROM OF TRUNK AND BACK: Primary | ICD-10-CM

## 2022-04-12 DIAGNOSIS — R29.898 DECREASED STRENGTH OF TRUNK AND BACK: ICD-10-CM

## 2022-04-12 PROCEDURE — 97110 THERAPEUTIC EXERCISES: CPT | Mod: CQ

## 2022-04-12 NOTE — PROGRESS NOTES
[facility]      Name: Rosa Lau  Clinic Number: [id number]    Therapy Diagnosis:   Encounter Diagnoses   Name Primary?    Decreased ROM of trunk and back Yes    Decreased strength of trunk and back      Physician: Shavon Price MD    Visit Date: 2022     Physician Orders: PT Eval and Treat   Medical Diagnosis from Referral: DDD (degenerative disc disease), lumbar  Evaluation Date: 2/10/2022  Authorization Period Expiration: M51.36 (ICD-10-CM) - DDD (degenerative disc disease), lumbar  Plan of Care Expiration: 5/10/2022  Visit # / Visits authorized: 10/ 20  (jennifer in MEDX) (Needs Retest Visit 11)        Time In: 10:10 AM  Time Out: 11:05 AM  Total Billable Time: 50 minutes     Precautions: Standard     Pattern of pain determined: 1 (instability)    Subjective   Rosa returns with low level back pain. She reports some increased (L) knee pain from last week after forgetting to use her shoe inserts. She reports minimal mid back discomfort when reaching overhead.    Patient reports tolerating previous visit well with no complaints of increased soreness  Patient reports their pain to be 1/10 on a 0-10 scale with 0 being no pain and 10 being the worst pain imaginable.  Pain Location: 1/10 right back and buttocks, 3.5/10 to (L) knee      Occupation: Disability since 2018  Leisure: Hanging out at home, watching TV    Pts goals: Be able to walk 30 minutes with only minimal low back pain.    Objective     Baseline Isometric Testing on Med X equipment: Testing administered by PT  Date of testin/10/2022  ROM 6-36 deg   Max Peak Torque 168    Min Peak Torque 66    Flex/Ext Ratio 2.8:1   % below normative data 11   *24% below at 0 degrees, also likely excess leg push at 36 degrees contributing to large ration               Treatment    Pt was instructed in and performed the following:     Rosa received therapeutic exercises to develop/improved posture,  "cardiovascular endurance, muscular endurance, lumbar/cervical ROM, strength and muscular endurance for 50 minutes including the following exercises:     Aerobic Exercise: recumbent bike x 5 minutes     LTR 10x5"  DKTC x10 5"  Piriformis stretch 3x20"  PPT 10x5"   PPT + Bridges + RTB x10  PPT + BKFO with RTB x10  ea c/cues for proper technique/sequencing  SOC x 10   +seated thoracic extension c/o bolster 10 x5"  +Lifting education   Floor<->waist lift of 5# crate   Golfer's lift    [facility] - Short 4/12/2022   Visit Number 10   VAS Pain Rating 1   Treadmill Time (in min.) -   Speed -   Recumbent Bike - Seat Pos. -   Time 5   Lumbar Stretches - Slouch 10   Flexion in Lying 10   Manual Therapy -   Lumbar Extension - Seat Pad -   Femur Restraint -   Top Dead Center -   Counterweight -   Lumbar Flexion -   Lumbar Extension -   Lumbar Peak Torque -   Lumbar Weight 57   Repetitions 16   Rating of Perceived Exertion 5       Peripheral muscle strengthening which included 1 set of 15-20 repetitions at a slow, controlled 10-13 second per rep pace focused on strengthening supporting musculature for improved body mechanics and functional mobility.  Pt and therapist focused on proper form during treatment to ensure optimal strengthening of each targeted muscle group.  Machines were utilized including torso rotation, leg extension, leg curl, chest press, upright row. Tricep extension, bicep curl, leg press, and hip abduction added visit 3    Rosa received the following manual therapy techniques:  00 minutes.    Home Exercises Provided and Patient Education Provided   Home exercises include:     LTR  DKTC  PPT   Bridge  Clamshells     Cardio program: visit 5, Benefits of cardio discussed 3/17/22 , patient states that she is looking to begin a walking program with a friend and also states that she has a recumbent bike she will start using.   Lifting education date: Visit 10, 4/12/22 Do's and Don'ts of lifting handout " provided  Lumbar roll:  Not obtained as of 03/15/22    Education provided:   Written Home Exercises Provided: Patient instructed to cont prior HEP.  Exercises were reviewed and Shona was able to demonstrate them prior to the end of the session.  Shona demonstrated good  understanding of the education provided.     See EMR under Patient Instructions for exercises provided prior visit and today's visit    Assessment   Rosa returns with low level lower back pain. She does report onset of some increased (L) knee pain after forgetting to put new show inserts in last week. She also reports mid lower back pain when reaching overhead and seated thoracic extension stretch with bolster was added to address this which she reported feeling a good stretch. Lifting education was also provided this visit as she will need retesting next visit ( Visit11) (PT not available for retesting today). Lumbar MedX resistance was maintained at 57# completing 16 reps with a RPE = 5/10.  The peripheral knee extension ex was deferred today due to her subjective c/o (L) knee pain. Will reassess tolerance for this again next visit. Updated Foto scoring today reflects slight improvement.    Patient is making fair progress towards established goals.  Pt will continue to benefit from skilled outpatient physical therapy to address the deficits stated in the impairment chart, provide pt/family education and to maximize pt's level of independence in the home and community environment.     Anticipated Barriers for therapy: None  Pt's spiritual, cultural and educational needs considered and pt agreeable to plan of care and goals as stated below:     GOALS: Pt is in agreement with the following goals.     Short term goals:  6 weeks or 10 visits   1.  Pt will demonstrate increased lumbar ROM by at least 9 degrees from the initial ROM value with improvements noted in functional ROM and ability to perform ADLs.  (approp and ongoing)  2.  Pt will demonstrate  increased MedX average isometric strength value  by 25% from initial test resulting in improved ability to perform bending, lifting, and carrying activities safely, confidently.  (approp and ongoing)  3.  Patient report a reduction in worst pain score by 1-2 points for improved tolerance for standing and cooking in her kitchen.  (approp and ongoing)  4.  Pt able to perform HEP correctly with minimal cueing or supervision from therapist to encourage independent management of symptoms. (approp and ongoing)      Long term goals: 10 weeks or 20 visits   1. Pt will demonstrate increased lumbar ROM by at least 18 degrees from initial ROM value, resulting in improved ability to perform functional fwd bending while standing and sitting. (approp and ongoing)  2. Pt will demonstrate increased MedX average isometric strength value  by 50% from initial test resulting in improved ability to perform bending, lifting, and carrying activities safely, confidently.  (approp and ongoing)  3. Pt to demonstrate ability to independently control and reduce their pain through posture positioning and mechanical movements throughout a typical day.  (approp and ongoing)  4.  Pt will demonstrate reduced pain and improved functional outcomes as reported on the FOTO by reaching a limitation score of < or = 30% or less in order to demonstrate subjective improvement in pt's condition.    (approp and ongoing)  5. Pt will demonstrate independence with the HEP at discharge  (approp and ongoing)  6.  Patient will report being able to stand for at least 30 minutes with no greater than 2/10 low back pain  (approp and ongoing)    Plan   Continue with established Plan of Care towards established PT goals.     Dickson Mayberry, PTA  4/12/2022

## 2022-04-14 ENCOUNTER — CLINICAL SUPPORT (OUTPATIENT)
Dept: REHABILITATION | Facility: OTHER | Age: 52
End: 2022-04-14
Attending: INTERNAL MEDICINE
Payer: MEDICARE

## 2022-04-14 DIAGNOSIS — R29.898 DECREASED STRENGTH OF TRUNK AND BACK: ICD-10-CM

## 2022-04-14 DIAGNOSIS — M25.69 DECREASED ROM OF TRUNK AND BACK: Primary | ICD-10-CM

## 2022-04-14 PROCEDURE — 97110 THERAPEUTIC EXERCISES: CPT

## 2022-04-14 NOTE — PROGRESS NOTES
[facility]      Name: Rosa Lau  Clinic Number: [id number]    Therapy Diagnosis:   Encounter Diagnoses   Name Primary?    Decreased ROM of trunk and back Yes    Decreased strength of trunk and back      Physician: Shavon Price MD    Visit Date: 2022     Physician Orders: PT Eval and Treat   Medical Diagnosis from Referral: DDD (degenerative disc disease), lumbar  Evaluation Date: 2/10/2022  Authorization Period Expiration: M51.36 (ICD-10-CM) - DDD (degenerative disc disease), lumbar  Plan of Care Expiration: 5/10/2022  Visit # / Visits authorized:   (jennifer in [employer])        Time In: 11:00 AM  Time Out: 12:00 AM  Total Billable Time: 50 minutes     Precautions: Standard     Pattern of pain determined: 1 (instability)    Subjective   Rosa returns with minimal back pain but a new report of L shoulder pain, though this was near fully resolved with STM.     Patient reports tolerating previous visit well with no complaints of increased soreness  Patient reports their pain to be 2/10 on a 0-10 scale with 0 being no pain and 10 being the worst pain imaginable.  Pain Location: 2/10 L shoulder     Occupation: Disability since 2018  Leisure: Hanging out at home, watching TV    Pts goals: Be able to walk 30 minutes with only minimal low back pain.    Objective     Baseline Isometric Testing on Med X equipment: Testing administered by PT  Date of testin/10/2022  ROM 6-36 deg   Max Peak Torque 168    Min Peak Torque 66    Flex/Ext Ratio 2.8:1   % below normative data 11   *24% below at 0 degrees, also likely excess leg push at 36 degrees contributing to large ration    Baseline Isometric Testing on Med X equipment: Testing administered by PT  Date of testin2022  ROM 0-45 deg   Max Peak Torque 131    Min Peak Torque 56    Flex/Ext Ratio 2.5:1   % below normative data  % loss from initial 27  11      OUTCOMES: FOTO  Initial: 48% limitation  Visit 6:  "48%  Visit 10: 47%       Treatment    Pt was instructed in and performed the following:     Rosa received therapeutic exercises to develop/improved posture, cardiovascular endurance, muscular endurance, lumbar/cervical ROM, strength and muscular endurance for 50 minutes including the following exercises:     Aerobic Exercise: recumbent bike x 5 minutes     LTR 10x5"  Alt SKTC x10 5"  Piriformis stretch 3x20"  Seated thoracic extension c/o bolster 10 x5"    [facility] 4/14/2022   Visit Number 11   VAS Pain Rating 1   Treadmill Time (in min.) -   Speed -   Recumbent Bike Seat Pos. -   Time -   Lumbar Stretches - Slouch Overcorrection 10   Flexion in Lying 10   Manual Therapy -   Lumbar Extension Seat Pad -   Femur Restraint -   Top Dead Center -   Counterweight -   Lumbar Flexion 45   Lumbar Extension 0   Lumbar Peak Torque 131   Min Torque 56   Test Percent Below Normative Data 27   Test Percent Loss in Strength from Initial  11   Lumbar Weight -   Repetitions -   Rating of Perceived Exertion -   Ice - Z Lie (in min.) 5         Not performed 4/14/22  PPT 10x5"   PPT + Bridges + RTB x10  PPT + BKFO with RTB x10  ea c/cues for proper technique/sequencing  SOC x 10         Peripheral muscle strengthening which included 1 set of 15-20 repetitions at a slow, controlled 10-13 second per rep pace focused on strengthening supporting musculature for improved body mechanics and functional mobility.  Pt and therapist focused on proper form during treatment to ensure optimal strengthening of each targeted muscle group.  Machines were utilized including torso rotation, leg extension, leg curl, chest press, upright row. Tricep extension, bicep curl, leg press, and hip abduction added visit 3    Rosa received the following manual therapy techniques:  00 minutes.    Home Exercises Provided and Patient Education Provided   Home exercises include:     LTR  DKTC  PPT   Bridge  Clamshells     Cardio program: visit 5, " Benefits of cardio discussed 3/17/22 , patient states that she is looking to begin a walking program with a friend and also states that she has a recumbent bike she will start using.   Lifting education date: Visit 10 Do's and Don'ts of lifting handout provided  Lumbar roll:  Not obtained    Education provided:   Written Home Exercises Provided: Patient instructed to cont prior HEP.  Exercises were reviewed and Hope was able to demonstrate them prior to the end of the session.  Hope demonstrated good  understanding of the education provided.     See EMR under Patient Instructions for exercises provided prior visit and today's visit    Assessment   Patient has attended 10 visits at [facility] which included MD evaluation, PT evaluation with isometric testing, and physical therapy treatment including HEP instruction, education, aerobic work, dynamic strengthening on med ex equipment for the spine, and whole body strengthening on med ex equipment with increasing weight loads.  Patient  is demonstrating increased ability to reduce symptoms, improved posture, improved   ROM, and improved   strength as follows:    -Improved lumbar ROM,  initially on med ex test 6-36 and   currently 0-45  -Improved strength at each test point on lumbar med ex IM test, though MedX results skewed by significant LE engagement during initial test, though Reduced pain  Noted by patient  -Initial outcome tool score 52 and current outcome tool score  53 indicating reduced pain and improved function          Patient is making fair progress towards established goals.  Pt will continue to benefit from skilled outpatient physical therapy to address the deficits stated in the impairment chart, provide pt/family education and to maximize pt's level of independence in the home and community environment.     Anticipated Barriers for therapy: None  Pt's spiritual, cultural and educational needs considered and pt agreeable to plan of care and  goals as stated below:     GOALS: Pt is in agreement with the following goals.     Short term goals:  6 weeks or 10 visits   1.  Pt will demonstrate increased lumbar ROM by at least 9 degrees from the initial ROM value with improvements noted in functional ROM and ability to perform ADLs.  (MET)  2.  Pt will demonstrate increased MedX average isometric strength value  by 25% from initial test resulting in improved ability to perform bending, lifting, and carrying activities safely, confidently.  (partially met)  3.  Patient report a reduction in worst pain score by 1-2 points for improved tolerance for standing and cooking in her kitchen.  (MET)  4.  Pt able to perform HEP correctly with minimal cueing or supervision from therapist to encourage independent management of symptoms. (MET)      Long term goals: 10 weeks or 20 visits   1. Pt will demonstrate increased lumbar ROM by at least 18 degrees from initial ROM value, resulting in improved ability to perform functional fwd bending while standing and sitting. (approp and ongoing)  2. Pt will demonstrate increased MedX average isometric strength value  by 50% from initial test resulting in improved ability to perform bending, lifting, and carrying activities safely, confidently.  (approp and ongoing)  3. Pt to demonstrate ability to independently control and reduce their pain through posture positioning and mechanical movements throughout a typical day.  (approp and ongoing)  4.  Pt will demonstrate reduced pain and improved functional outcomes as reported on the FOTO by reaching a limitation score of < or = 30% or less in order to demonstrate subjective improvement in pt's condition.    (approp and ongoing)  5. Pt will demonstrate independence with the HEP at discharge  (approp and ongoing)  6.  Patient will report being able to stand for at least 30 minutes with no greater than 2/10 low back pain  (approp and ongoing)    Plan   Continue with established Plan of  Care towards established PT goals.     Uvaldo Turk, PT  4/14/2022

## 2022-04-19 ENCOUNTER — TELEPHONE (OUTPATIENT)
Dept: OBSTETRICS AND GYNECOLOGY | Facility: CLINIC | Age: 52
End: 2022-04-19
Payer: MEDICARE

## 2022-04-19 ENCOUNTER — CLINICAL SUPPORT (OUTPATIENT)
Dept: REHABILITATION | Facility: OTHER | Age: 52
End: 2022-04-19
Attending: INTERNAL MEDICINE
Payer: MEDICARE

## 2022-04-19 DIAGNOSIS — M25.69 DECREASED ROM OF TRUNK AND BACK: Primary | ICD-10-CM

## 2022-04-19 DIAGNOSIS — R29.898 DECREASED STRENGTH OF TRUNK AND BACK: ICD-10-CM

## 2022-04-19 PROCEDURE — 97110 THERAPEUTIC EXERCISES: CPT | Mod: CQ

## 2022-04-19 NOTE — PROGRESS NOTES
[facility]      Name: Rosa Lau  Clinic Number: [id number]    Therapy Diagnosis:   Encounter Diagnoses   Name Primary?    Decreased ROM of trunk and back Yes    Decreased strength of trunk and back      Physician: Shavon Price MD    Visit Date: 2022     Physician Orders: PT Eval and Treat   Medical Diagnosis from Referral: DDD (degenerative disc disease), lumbar  Evaluation Date: 2/10/2022  Authorization Period Expiration: M51.36 (ICD-10-CM) - DDD (degenerative disc disease), lumbar  Plan of Care Expiration: 5/10/2022  Visit # / Visits authorized:   (jennifer in [employer])      Time In: 10:15 AM  Time Out: 11:05 AM  Total Billable Time: 45 minutes     Precautions: Standard     Pattern of pain determined: 1 (instability)    Subjective   Rosa reports that she is feeling pretty good today and states that she is pain-free.  States that she is excited to be going out to lunch today for the first time since Covid and will be doing a lot of walking with her friend.     Patient reports tolerating previous visit well with no complaints of increased soreness  Patient reports their pain to be 0/10 on a 0-10 scale with 0 being no pain and 10 being the worst pain imaginable.  Pain Location: 2/10 L shoulder     Occupation: Disability since 2018  Leisure: Hanging out at home, watching TV    Pts goals: Be able to walk 30 minutes with only minimal low back pain.    Objective     Baseline Isometric Testing on Med X equipment: Testing administered by PT  Date of testin/10/2022  ROM 6-36 deg   Max Peak Torque 168    Min Peak Torque 66    Flex/Ext Ratio 2.8:1   % below normative data 11   *24% below at 0 degrees, also likely excess leg push at 36 degrees contributing to large ration    Baseline Isometric Testing on Med X equipment: Testing administered by PT  Date of testin2022  ROM 0-45 deg   Max Peak Torque 131    Min Peak Torque 56    Flex/Ext Ratio 2.5:1   %  "below normative data  % loss from initial 27  11      OUTCOMES: FOTO  Initial: 48% limitation  Visit 6: 48%  Visit 10: 47%       Treatment    Pt was instructed in and performed the following:     Rosa received therapeutic exercises to develop/improved posture, cardiovascular endurance, muscular endurance, lumbar/cervical ROM, strength and muscular endurance for 45 minutes including the following exercises:     Aerobic Exercise: recumbent bike x 5 minutes     LTR 10x5"  Alt SKTC x10 5"  Piriformis stretch 3x20"  PPT + Bridges + GTB x10  SOC x 10     Not performed 4/14/22  PPT 10x5"   PPT + BKFO with RTB x10  ea c/cues for proper technique/sequencing  Seated thoracic extension c/o bolster 10 x5"    [facility] 4/19/2022   Visit Number 12   VAS Pain Rating 0   Treadmill Time (in min.) -   Speed -   Recumbent Bike - Seat Pos. -   Time 5   Lumbar Stretches - Slouch 10   Flexion in Lying 10   Manual Therapy -   Lumbar Extension - Seat Pad -   Femur Restraint -   Top Dead Center -   Counterweight -   Lumbar Flexion -   Lumbar Extension -   Lumbar Peak Torque -   Lumbar Weight 57   Repetitions 17   Rating of Perceived Exertion 5       Peripheral muscle strengthening which included 1 set of 15-20 repetitions at a slow, controlled 10-13 second per rep pace focused on strengthening supporting musculature for improved body mechanics and functional mobility.  Pt and therapist focused on proper form during treatment to ensure optimal strengthening of each targeted muscle group.  Machines were utilized including torso rotation, leg extension, leg curl, chest press, upright row. Tricep extension, bicep curl, leg press, and hip abduction added visit 3    Rosa received the following manual therapy techniques:  00 minutes.    Home Exercises Provided and Patient Education Provided   Home exercises include:     LTR  DKTC  PPT   Bridge  Elyse     Cardio program: visit 5, Benefits of cardio discussed 3/17/22 , " patient states that she is looking to begin a walking program with a friend and also states that she has a recumbent bike she will start using.   Lifting education date: Visit 10 Do's and Don'ts of lifting handout provided  Lumbar roll:  Purchased and received in March 2021.     Education provided:   Written Home Exercises Provided: Patient instructed to cont prior HEP.  Exercises were reviewed and Shona was able to demonstrate them prior to the end of the session.  Hope demonstrated good  understanding of the education provided.     See EMR under Patient Instructions for exercises provided prior visit     Assessment   Hope returns without c/o pain today.  Treatment continued with lumbopelvic/core flexibility and strengthening ex's which she was able to perform without c/o including progression to GTB for Bridging ex. Lumbar Medx resistance was maintained at 57# and she was able to complete 17 reps with a RPE = 5/10. Will monitor and attempt to progress per HB protocol and patient tolerance.    Patient is making Good progress towards established goals.  Pt will continue to benefit from skilled outpatient physical therapy to address the deficits stated in the impairment chart, provide pt/family education and to maximize pt's level of independence in the home and community environment.     Anticipated Barriers for therapy: None  Pt's spiritual, cultural and educational needs considered and pt agreeable to plan of care and goals as stated below:     GOALS: Pt is in agreement with the following goals.     Short term goals:  6 weeks or 10 visits   1.  Pt will demonstrate increased lumbar ROM by at least 9 degrees from the initial ROM value with improvements noted in functional ROM and ability to perform ADLs.  (MET)  2.  Pt will demonstrate increased MedX average isometric strength value  by 25% from initial test resulting in improved ability to perform bending, lifting, and carrying activities safely, confidently.   (partially met)  3.  Patient report a reduction in worst pain score by 1-2 points for improved tolerance for standing and cooking in her kitchen.  (MET)  4.  Pt able to perform HEP correctly with minimal cueing or supervision from therapist to encourage independent management of symptoms. (MET)      Long term goals: 10 weeks or 20 visits   1. Pt will demonstrate increased lumbar ROM by at least 18 degrees from initial ROM value, resulting in improved ability to perform functional fwd bending while standing and sitting. (approp and ongoing)  2. Pt will demonstrate increased MedX average isometric strength value  by 50% from initial test resulting in improved ability to perform bending, lifting, and carrying activities safely, confidently.  (approp and ongoing)  3. Pt to demonstrate ability to independently control and reduce their pain through posture positioning and mechanical movements throughout a typical day.  (approp and ongoing)  4.  Pt will demonstrate reduced pain and improved functional outcomes as reported on the FOTO by reaching a limitation score of < or = 30% or less in order to demonstrate subjective improvement in pt's condition.    (approp and ongoing)  5. Pt will demonstrate independence with the HEP at discharge  (approp and ongoing)  6.  Patient will report being able to stand for at least 30 minutes with no greater than 2/10 low back pain  (approp and ongoing)    Plan   Continue with established Plan of Care towards established PT goals.     Dickson Mayberry, PTA  4/19/2022

## 2022-04-19 NOTE — TELEPHONE ENCOUNTER
----- Message from Elle Alonzo Patient Care Assistant sent at 3/31/2022  2:26 PM CDT -----  Needs u/s with virtual visit

## 2022-04-21 ENCOUNTER — PATIENT MESSAGE (OUTPATIENT)
Dept: OBSTETRICS AND GYNECOLOGY | Facility: CLINIC | Age: 52
End: 2022-04-21
Payer: MEDICARE

## 2022-04-21 ENCOUNTER — CLINICAL SUPPORT (OUTPATIENT)
Dept: REHABILITATION | Facility: OTHER | Age: 52
End: 2022-04-21
Attending: INTERNAL MEDICINE
Payer: MEDICARE

## 2022-04-21 DIAGNOSIS — R29.898 DECREASED STRENGTH OF TRUNK AND BACK: ICD-10-CM

## 2022-04-21 DIAGNOSIS — M25.69 DECREASED ROM OF TRUNK AND BACK: Primary | ICD-10-CM

## 2022-04-21 PROCEDURE — 97110 THERAPEUTIC EXERCISES: CPT

## 2022-04-21 NOTE — PROGRESS NOTES
[facility]      Name: Rosa Lau  Clinic Number: [id number]    Therapy Diagnosis:   Encounter Diagnoses   Name Primary?    Decreased ROM of trunk and back Yes    Decreased strength of trunk and back      Physician: Shavon Price MD    Visit Date: 2022     Physician Orders: PT Eval and Treat   Medical Diagnosis from Referral: DDD (degenerative disc disease), lumbar  Evaluation Date: 2/10/2022  Authorization Period Expiration: M51.36 (ICD-10-CM) - DDD (degenerative disc disease), lumbar  Plan of Care Expiration: 5/10/2022  Visit # / Visits authorized:   (jennifer in [employer])      Time In: 10:15 AM  Time Out: 11:10 AM  Total Billable Time: 50 minutes     Precautions: Standard     Pattern of pain determined: 1 (instability)    Subjective   Rosa reports that she is feeling pretty good today, is looking forward to focusing a bit more on her posture now that her back is stronger. She went to lunch with her friend and did really well, she just had some low level back soreness the following morning that has since resolved.     Patient reports tolerating previous visit well with no complaints of increased soreness  Patient reports their pain to be 0/10 on a 0-10 scale with 0 being no pain and 10 being the worst pain imaginable.  Pain Location: 2/10 L shoulder     Occupation: Disability since 2018  Leisure: Hanging out at home, watching TV    Pts goals: Be able to walk 30 minutes with only minimal low back pain.    Objective     Baseline Isometric Testing on Med X equipment: Testing administered by PT  Date of testin/10/2022  ROM 6-36 deg   Max Peak Torque 168    Min Peak Torque 66    Flex/Ext Ratio 2.8:1   % below normative data 11   *24% below at 0 degrees, also likely excess leg push at 36 degrees contributing to large ration    Baseline Isometric Testing on Med X equipment: Testing administered by PT  Date of testin2022  ROM 0-45 deg   Max Peak Torque  "131    Min Peak Torque 56    Flex/Ext Ratio 2.5:1   % below normative data  % loss from initial 27  11      OUTCOMES: FOTO  Initial: 48% limitation  Visit 6: 48%  Visit 10: 47%   Goal: <30%    Treatment    Pt was instructed in and performed the following:     Rosa received therapeutic exercises to develop/improved posture, cardiovascular endurance, muscular endurance, lumbar/cervical ROM, strength and muscular endurance for 45 minutes including the following exercises:       LTR 10x5"  Alt SKTC x10 5"  Piriformis stretch 3x20"  Seated scap retraction + no money x 20  SOC x 10     Not performed 4/21/22  PPT 10x5"   PPT + Bridges + GTB x10  PPT + BKFO with RTB x10  ea c/cues for proper technique/sequencing  Seated thoracic extension c/o bolster 10 x5"    [facility] 4/21/2022   Visit Number 13   VAS Pain Rating 0   Treadmill Time (in min.) -   Speed -   Recumbent Bike - Seat Pos. -   Time -   Lumbar Stretches - Slouch 10   Flexion in Lying 10   Manual Therapy -   Lumbar Extension - Seat Pad -   Femur Restraint -   Top Dead Center -   Counterweight -   Lumbar Flexion -   Lumbar Extension -   Lumbar Peak Torque -   Lumbar Weight 57   Repetitions 17   Rating of Perceived Exertion 5           Peripheral muscle strengthening which included 1 set of 15-20 repetitions at a slow, controlled 10-13 second per rep pace focused on strengthening supporting musculature for improved body mechanics and functional mobility.  Pt and therapist focused on proper form during treatment to ensure optimal strengthening of each targeted muscle group.  Machines were utilized including torso rotation, leg extension, leg curl, chest press, upright row. Tricep extension, bicep curl, leg press, and hip abduction added visit 3    Rosa received the following manual therapy techniques:  00 minutes.    Home Exercises Provided and Patient Education Provided   Home exercises include:     LTR  DKTC  PPT   Bridge  Clamshells     Cardio " program: visit 5, Benefits of cardio discussed 3/17/22 , patient states that she is looking to begin a walking program with a friend and also states that she has a recumbent bike she will start using.   Lifting education date: Visit 10 Do's and Don'ts of lifting handout provided  Lumbar roll:  Purchased and received in March 2021.     Education provided:   Written Home Exercises Provided: Patient instructed to cont prior HEP.  Exercises were reviewed and Shona was able to demonstrate them prior to the end of the session.  Shona demonstrated good  understanding of the education provided.     See EMR under Patient Instructions for exercises provided prior visit     Assessment   Shona returns without c/o pain today, which she is happy with considering the amount of walking she did two days ago and the soreness she had yesterday morning. Treatment continued with lumbopelvic/core flexibility and strengthening ex's which she was able to perform without issue. Lumbar Medx resistance was maintained at 57# and she was able to complete 17 reps with a RPE = 5/10. Will monitor and attempt to progress per HB protocol and patient tolerance.    Patient is making Good progress towards established goals.  Pt will continue to benefit from skilled outpatient physical therapy to address the deficits stated in the impairment chart, provide pt/family education and to maximize pt's level of independence in the home and community environment.     Anticipated Barriers for therapy: None  Pt's spiritual, cultural and educational needs considered and pt agreeable to plan of care and goals as stated below:     GOALS: Pt is in agreement with the following goals.     Short term goals:  6 weeks or 10 visits   1.  Pt will demonstrate increased lumbar ROM by at least 9 degrees from the initial ROM value with improvements noted in functional ROM and ability to perform ADLs.  (MET)  2.  Pt will demonstrate increased MedX average isometric strength value   by 25% from initial test resulting in improved ability to perform bending, lifting, and carrying activities safely, confidently.  (partially met)  3.  Patient report a reduction in worst pain score by 1-2 points for improved tolerance for standing and cooking in her kitchen.  (MET)  4.  Pt able to perform HEP correctly with minimal cueing or supervision from therapist to encourage independent management of symptoms. (MET)      Long term goals: 10 weeks or 20 visits   1. Pt will demonstrate increased lumbar ROM by at least 18 degrees from initial ROM value, resulting in improved ability to perform functional fwd bending while standing and sitting. (approp and ongoing)  2. Pt will demonstrate increased MedX average isometric strength value  by 50% from initial test resulting in improved ability to perform bending, lifting, and carrying activities safely, confidently.  (approp and ongoing)  3. Pt to demonstrate ability to independently control and reduce their pain through posture positioning and mechanical movements throughout a typical day.  (approp and ongoing)  4.  Pt will demonstrate reduced pain and improved functional outcomes as reported on the FOTO by reaching a limitation score of < or = 30% or less in order to demonstrate subjective improvement in pt's condition.    (approp and ongoing)  5. Pt will demonstrate independence with the HEP at discharge  (approp and ongoing)  6.  Patient will report being able to stand for at least 30 minutes with no greater than 2/10 low back pain  (approp and ongoing)    Plan   Continue with established Plan of Care towards established PT goals.     Uvaldo Turk, PT  4/21/2022

## 2022-04-22 ENCOUNTER — PATIENT MESSAGE (OUTPATIENT)
Dept: OBSTETRICS AND GYNECOLOGY | Facility: CLINIC | Age: 52
End: 2022-04-22
Payer: MEDICARE

## 2022-04-22 NOTE — TELEPHONE ENCOUNTER
Have her do the ul;trasound sooner, next week and move the virtual visit up sooner also  Dx- irregular bleeding, perimenopausal bleeding

## 2022-04-26 ENCOUNTER — CLINICAL SUPPORT (OUTPATIENT)
Dept: REHABILITATION | Facility: OTHER | Age: 52
End: 2022-04-26
Attending: INTERNAL MEDICINE
Payer: MEDICARE

## 2022-04-26 DIAGNOSIS — M25.69 DECREASED ROM OF TRUNK AND BACK: Primary | ICD-10-CM

## 2022-04-26 DIAGNOSIS — R29.898 DECREASED STRENGTH OF TRUNK AND BACK: ICD-10-CM

## 2022-04-26 PROCEDURE — 97110 THERAPEUTIC EXERCISES: CPT | Mod: CQ

## 2022-04-26 NOTE — PROGRESS NOTES
[facility]      Name: Rosa Lau  Clinic Number: [id number]    Therapy Diagnosis:   Encounter Diagnoses   Name Primary?    Decreased ROM of trunk and back Yes    Decreased strength of trunk and back      Physician: Shavon Price MD    Visit Date: 2022     Physician Orders: PT Eval and Treat   Medical Diagnosis from Referral: DDD (degenerative disc disease), lumbar  Evaluation Date: 2/10/2022  Authorization Period Expiration: M51.36 (ICD-10-CM) - DDD (degenerative disc disease), lumbar  Plan of Care Expiration: 5/10/2022  Visit # / Visits authorized:   (jennifer in MED[employer])      Time In: 10:15 AM  Time Out: 11:10 AM  Total Billable Time: 50 minutes     Precautions: Standard     Pattern of pain determined: 1 (instability)    Subjective   Rosa reports Minimal lower back soreness after extended sitting in an uncomfortable chair over the weekend.    Patient reports tolerating previous visit well with no complaints of increased soreness  Patient reports their pain to be 1-2/10 on a 0-10 scale with 0 being no pain and 10 being the worst pain imaginable.  Pain Location: lower back     Occupation: Disability since 2018  Leisure: Hanging out at home, watching TV    Pts goals: Be able to walk 30 minutes with only minimal low back pain.    Objective     Baseline Isometric Testing on Med X equipment: Testing administered by PT  Date of testin/10/2022  ROM 6-36 deg   Max Peak Torque 168    Min Peak Torque 66    Flex/Ext Ratio 2.8:1   % below normative data 11   *24% below at 0 degrees, also likely excess leg push at 36 degrees contributing to large ration    Baseline Isometric Testing on Med X equipment: Testing administered by PT  Date of testin2022  ROM 0-45 deg   Max Peak Torque 131    Min Peak Torque 56    Flex/Ext Ratio 2.5:1   % below normative data  % loss from initial 27  11      OUTCOMES: FOTO  Initial: 48% limitation  Visit 6: 48%  Visit 10:  "47%   Goal: <30%    Treatment    Pt was instructed in and performed the following:     Rosa received therapeutic exercises to develop/improved posture, cardiovascular endurance, muscular endurance, lumbar/cervical ROM, strength and muscular endurance for 50 minutes including the following exercises:     LTR 10x5"  Alt SKTC x10 5"  Piriformis stretch 3x20"  +Open Book x 10 each  Seated scap retraction + no money + GTBx 20  SOC x 10     Not performed 4/21/22  PPT 10x5"   PPT + Bridges + GTB x10  PPT + BKFO with RTB x10  ea c/cues for proper technique/sequencing  Seated thoracic extension c/o bolster 10 x5"    Health[facility] Therapy - Short 4/26/2022   Visit Number 14   VAS Pain Rating 2   Treadmill Time (in min.) -   Speed -   Recumbent Bike - Seat Pos. -   Time 5   Lumbar Stretches - Slouch 10   Flexion in Lying 10   Manual Therapy -   Lumbar Extension - Seat Pad -   Femur Restraint -   Top Dead Center -   Counterweight -   Lumbar Flexion -   Lumbar Extension -   Lumbar Peak Torque -   Lumbar Weight 57   Repetitions 20   Rating of Perceived Exertion 5       Peripheral muscle strengthening which included 1 set of 15-20 repetitions at a slow, controlled 10-13 second per rep pace focused on strengthening supporting musculature for improved body mechanics and functional mobility.  Pt and therapist focused on proper form during treatment to ensure optimal strengthening of each targeted muscle group.  Machines were utilized including torso rotation, leg extension, leg curl, chest press, upright row. Tricep extension, bicep curl, leg press, and hip abduction added visit 3    Rosa received the following manual therapy techniques:  00 minutes.    Home Exercises Provided and Patient Education Provided   Home exercises include:     LTR  DKTC  PPT   Bridge  Elyse     Cardio program: visit 5, Benefits of cardio discussed 3/17/22 , patient states that she is looking to begin a walking program with a friend and also states " that she has a recumbent bike she will start using.   Lifting education date: Visit 10 Do's and Don'ts of lifting handout provided  Lumbar roll:  Purchased and received in March 2021.     Education provided:   Written Home Exercises Provided: Patient instructed to cont prior HEP. Added Open book 4/26/22.   Exercises were reviewed and Shona was able to demonstrate them prior to the end of the session.  Shona demonstrated good  understanding of the education provided.     See EMR under Patient Instructions for exercises provided prior visit     Assessment   Shona returns with mild report of lower back pain and stiffness today after prolonged sitting over the weekend in and uncomfortable chair. Treatment continued with lumbopelvic/core flexibility and strengthening ex's which she was able to perform without c/o. Added Open book for additional postural stretch which was reported as comfortable and helpful therefore, added to HEP. She was also progressed to GTB resistance for no money performing with muscular fatigue but without increased pain. Lumbar MedX resistance was maintained at 57# and she was able to progress to complete 20 reps with a RPE = 4/10. Will monitor and attempt to progress per HB protocol and patient tolerance.    Patient is making Good progress towards established goals.  Pt will continue to benefit from skilled outpatient physical therapy to address the deficits stated in the impairment chart, provide pt/family education and to maximize pt's level of independence in the home and community environment.     Anticipated Barriers for therapy: None  Pt's spiritual, cultural and educational needs considered and pt agreeable to plan of care and goals as stated below:     GOALS: Pt is in agreement with the following goals.     Short term goals:  6 weeks or 10 visits   1.  Pt will demonstrate increased lumbar ROM by at least 9 degrees from the initial ROM value with improvements noted in functional ROM and  ability to perform ADLs.  (MET)  2.  Pt will demonstrate increased MedX average isometric strength value  by 25% from initial test resulting in improved ability to perform bending, lifting, and carrying activities safely, confidently.  (partially met)  3.  Patient report a reduction in worst pain score by 1-2 points for improved tolerance for standing and cooking in her kitchen.  (MET)  4.  Pt able to perform HEP correctly with minimal cueing or supervision from therapist to encourage independent management of symptoms. (MET)      Long term goals: 10 weeks or 20 visits   1. Pt will demonstrate increased lumbar ROM by at least 18 degrees from initial ROM value, resulting in improved ability to perform functional fwd bending while standing and sitting. (approp and ongoing)  2. Pt will demonstrate increased MedX average isometric strength value  by 50% from initial test resulting in improved ability to perform bending, lifting, and carrying activities safely, confidently.  (approp and ongoing)  3. Pt to demonstrate ability to independently control and reduce their pain through posture positioning and mechanical movements throughout a typical day.  (approp and ongoing)  4.  Pt will demonstrate reduced pain and improved functional outcomes as reported on the FOTO by reaching a limitation score of < or = 30% or less in order to demonstrate subjective improvement in pt's condition.    (approp and ongoing)  5. Pt will demonstrate independence with the HEP at discharge  (approp and ongoing)  6.  Patient will report being able to stand for at least 30 minutes with no greater than 2/10 low back pain  (approp and ongoing)    Plan   Continue with established Plan of Care towards established PT goals.     Dickson Mayberry, PTA  4/26/2022

## 2022-04-27 ENCOUNTER — HOSPITAL ENCOUNTER (OUTPATIENT)
Dept: RADIOLOGY | Facility: OTHER | Age: 52
Discharge: HOME | End: 2022-04-27
Attending: OBSTETRICS & GYNECOLOGY
Payer: MEDICARE

## 2022-04-27 DIAGNOSIS — N83.202 LEFT OVARIAN CYST: ICD-10-CM

## 2022-04-27 PROCEDURE — 76830 TRANSVAGINAL US NON-OB: CPT | Mod: 26,,, | Performed by: RADIOLOGY

## 2022-04-27 PROCEDURE — 76856 US PELVIS COMP WITH TRANSVAG NON-OB (XPD): ICD-10-PCS | Mod: 26,,, | Performed by: RADIOLOGY

## 2022-04-27 PROCEDURE — 76830 US PELVIS COMP WITH TRANSVAG NON-OB (XPD): ICD-10-PCS | Mod: 26,,, | Performed by: RADIOLOGY

## 2022-04-27 PROCEDURE — 76856 US EXAM PELVIC COMPLETE: CPT | Mod: 26,,, | Performed by: RADIOLOGY

## 2022-04-27 PROCEDURE — 76830 TRANSVAGINAL US NON-OB: CPT | Mod: TC

## 2022-04-29 ENCOUNTER — TELEPHONE (OUTPATIENT)
Dept: OBSTETRICS AND GYNECOLOGY | Facility: CLINIC | Age: 52
End: 2022-04-29
Payer: MEDICARE

## 2022-04-29 ENCOUNTER — OFFICE VISIT (OUTPATIENT)
Dept: OBSTETRICS AND GYNECOLOGY | Facility: CLINIC | Age: 52
End: 2022-04-29
Attending: OBSTETRICS & GYNECOLOGY
Payer: MEDICARE

## 2022-04-29 DIAGNOSIS — N95.1 MENOPAUSAL SYNDROME: Primary | ICD-10-CM

## 2022-04-29 PROCEDURE — 99499 UNLISTED E&M SERVICE: CPT | Mod: 95,,, | Performed by: OBSTETRICS & GYNECOLOGY

## 2022-04-29 PROCEDURE — 99499 NO LOS: ICD-10-PCS | Mod: 95,,, | Performed by: OBSTETRICS & GYNECOLOGY

## 2022-04-29 NOTE — TELEPHONE ENCOUNTER
I called pt to discuss u/s. Had virtual visit today at 4 pm that I had to delay because of office overflow. I left  that I would call pt Monday

## 2022-05-02 ENCOUNTER — PATIENT MESSAGE (OUTPATIENT)
Dept: REHABILITATION | Facility: OTHER | Age: 52
End: 2022-05-02
Payer: MEDICARE

## 2022-05-03 ENCOUNTER — PATIENT MESSAGE (OUTPATIENT)
Dept: OBSTETRICS AND GYNECOLOGY | Facility: CLINIC | Age: 52
End: 2022-05-03
Payer: MEDICARE

## 2022-05-03 DIAGNOSIS — N92.4 ABNORMAL PERIMENOPAUSAL BLEEDING: Primary | ICD-10-CM

## 2022-05-04 RX ORDER — LEVONORGESTREL / ETHINYL ESTRADIOL AND ETHINYL ESTRADIOL 150-30(84)
1 KIT ORAL DAILY
Qty: 84 EACH | Refills: 3 | Status: SHIPPED | OUTPATIENT
Start: 2022-05-04 | End: 2022-09-12

## 2022-05-04 NOTE — TELEPHONE ENCOUNTER
I called pt. Having periods every 2 weeks on HRT. She does feel much better on HRT. We discussed. I think perimenopausal. U/s showed EMS 5 mm and ovarian cyst that is unchanged in size from CT done 7/2021.  We discussed options and  I recommend go back to OCP for a little while. She did well on them in the past. Explained coagulopathy is similar to HRT anyway. She agrees. Did well on Seasonique in the past. rx sent

## 2022-05-04 NOTE — PROGRESS NOTES
I was not able to do virtual visit because I was still seeing clinic patients. I ended up calling the patient on 5/4 to discuss her u/s and bleeding. See separate tel call

## 2022-05-05 ENCOUNTER — CLINICAL SUPPORT (OUTPATIENT)
Dept: REHABILITATION | Facility: OTHER | Age: 52
End: 2022-05-05
Attending: INTERNAL MEDICINE
Payer: MEDICARE

## 2022-05-05 DIAGNOSIS — R29.898 DECREASED STRENGTH OF TRUNK AND BACK: ICD-10-CM

## 2022-05-05 DIAGNOSIS — M25.69 DECREASED ROM OF TRUNK AND BACK: Primary | ICD-10-CM

## 2022-05-05 PROCEDURE — 97110 THERAPEUTIC EXERCISES: CPT | Mod: CQ

## 2022-05-05 NOTE — PROGRESS NOTES
"[facility] Physical Therapy Treatment      Name: Rosa Lau  Clinic Number: [id number]    Therapy Diagnosis:   Encounter Diagnoses   Name Primary?    Decreased ROM of trunk and back Yes    Decreased strength of trunk and back      Physician: Shavon Price MD    Visit Date: 2022     Physician Orders: PT Eval and Treat   Medical Diagnosis from Referral: DDD (degenerative disc disease), lumbar  Evaluation Date: 2/10/2022  Authorization Period Expiration: M51.36 (ICD-10-CM) - DDD (degenerative disc disease), lumbar  Plan of Care Expiration: 5/10/2022  Visit # / Visits authorized: 15/ 20  (jennifer in MEDX)      Time In: 12:00 PM  Time Out: 12:50 PM  Total Billable Time: 50 minutes     Precautions: Standard     Pattern of pain determined: 1 (instability)    Subjective   Rosa denies any lower back pain upon arrival but reports increased cervical/UT tightness following driving to and from [place].  She also has a new compliant of feeling "unsteady or off-balance" with prolonged standing and walking.    Patient reports tolerating previous visit well with no complaints of increased soreness  Patient reports their pain to be 0/10 on a 0-10 scale with 0 being no pain and 10 being the worst pain imaginable.  Pain Location: lower back     Occupation: Disability since 2018  Leisure: Hanging out at home, watching TV    Pts goals: Be able to walk 30 minutes with only minimal low back pain.    Objective     Baseline Isometric Testing on Med X equipment: Testing administered by PT  Date of testin/10/2022  ROM 6-36 deg   Max Peak Torque 168    Min Peak Torque 66    Flex/Ext Ratio 2.8:1   % below normative data 11   *24% below at 0 degrees, also likely excess leg push at 36 degrees contributing to large ration    Baseline Isometric Testing on Med X equipment: Testing administered by PT  Date of testin2022  ROM 0-45 deg   Max Peak Torque 131    Min Peak Torque 56    Flex/Ext Ratio 2.5:1   % " "below normative data  % loss from initial 27  11      OUTCOMES: FOTO  Initial: 48% limitation  Visit 6: 48%  Visit 10: 47%   Goal: <30%    Treatment    Pt was instructed in and performed the following:     Rosa received therapeutic exercises to develop/improved posture, cardiovascular endurance, muscular endurance, lumbar/cervical ROM, strength and muscular endurance for 45 minutes including the following exercises:     +UT stretch 3 x 20"  LTR 10x5"  PPT + Bridges + GTB x10  Open Book x 10 each  Seated scap retraction + no money GTBx 20  SOC x 10   +Paloff Press w/ GTB x10 ea    Not performed 4/21/22  PPT 10x5"   Piriformis stretch 3x20"  PPT + BKFO with RTB x10  ea c/cues for proper technique/sequencing  Seated thoracic extension c/o bolster 10 x5"  Alt SKTC x10 5"    Health[facility] Therapy - Short 4/26/2022   Visit Number 14   VAS Pain Rating 2   Treadmill Time (in min.) -   Speed -   Recumbent Bike - Seat Pos. -   Time 5   Lumbar Stretches - Slouch 10   Flexion in Lying 10   Manual Therapy -   Lumbar Extension - Seat Pad -   Femur Restraint -   Top Dead Center -   Counterweight -   Lumbar Flexion -   Lumbar Extension -   Lumbar Peak Torque -   Lumbar Weight 57   Repetitions 20   Rating of Perceived Exertion 5       Peripheral muscle strengthening which included 1 set of 15-20 repetitions at a slow, controlled 10-13 second per rep pace focused on strengthening supporting musculature for improved body mechanics and functional mobility.  Pt and therapist focused on proper form during treatment to ensure optimal strengthening of each targeted muscle group.  Machines were utilized including torso rotation, leg extension, leg curl, chest press, upright row. Tricep extension, bicep curl, leg press, and hip abduction added visit 3    Rosa received the following manual therapy techniques:  00 minutes.    Home Exercises Provided and Patient Education Provided   Home exercises include:     LTR  DKTC  PPT " "  [facility]     Cardio program: visit 5, Benefits of cardio discussed 3/17/22 , patient states that she is looking to begin a walking program with a friend and also states that she has a recumbent bike she will start using.   Lifting education date: Visit 10 Do's and Don'ts of lifting handout provided  Lumbar roll:  Purchased and received in March 2021.     Education provided:   Written Home Exercises Provided: Patient instructed to cont prior HEP. Added Open book 4/26/22.   Exercises were reviewed and Shona was able to demonstrate them prior to the end of the session.  Hope demonstrated good  understanding of the education provided.     See EMR under Patient Instructions for exercises provided prior visit     Assessment   Hope returned without complaints of lower back pain but reports cervical/UT muscle stiffness and a feeling of "unstediness or off-balance".  UT stretch and paloff press added today to address pt's reports of UT tightness and to work on dynamic core activation.  Medx resistance increased to 61#.  He completed 15 reps at a RPE of 6/10.  Will monitor and attempt to progress per pt's tolerance and HB protocol.    Patient is making Good progress towards established goals.  Pt will continue to benefit from skilled outpatient physical therapy to address the deficits stated in the impairment chart, provide pt/family education and to maximize pt's level of independence in the home and community environment.     Anticipated Barriers for therapy: None  Pt's spiritual, cultural and educational needs considered and pt agreeable to plan of care and goals as stated below:     GOALS: Pt is in agreement with the following goals.     Short term goals:  6 weeks or 10 visits   1.  Pt will demonstrate increased lumbar ROM by at least 9 degrees from the initial ROM value with improvements noted in functional ROM and ability to perform ADLs.  (MET)  2.  Pt will demonstrate increased MedX average isometric " strength value  by 25% from initial test resulting in improved ability to perform bending, lifting, and carrying activities safely, confidently.  (partially met)  3.  Patient report a reduction in worst pain score by 1-2 points for improved tolerance for standing and cooking in her kitchen.  (MET)  4.  Pt able to perform HEP correctly with minimal cueing or supervision from therapist to encourage independent management of symptoms. (MET)      Long term goals: 10 weeks or 20 visits   1. Pt will demonstrate increased lumbar ROM by at least 18 degrees from initial ROM value, resulting in improved ability to perform functional fwd bending while standing and sitting. (approp and ongoing)  2. Pt will demonstrate increased MedX average isometric strength value  by 50% from initial test resulting in improved ability to perform bending, lifting, and carrying activities safely, confidently.  (approp and ongoing)  3. Pt to demonstrate ability to independently control and reduce their pain through posture positioning and mechanical movements throughout a typical day.  (approp and ongoing)  4.  Pt will demonstrate reduced pain and improved functional outcomes as reported on the FOTO by reaching a limitation score of < or = 30% or less in order to demonstrate subjective improvement in pt's condition.    (approp and ongoing)  5. Pt will demonstrate independence with the HEP at discharge  (approp and ongoing)  6.  Patient will report being able to stand for at least 30 minutes with no greater than 2/10 low back pain  (approp and ongoing)    Plan   Continue with established Plan of Care towards established PT goals.     Rodrigue Gorman, PTA  5/5/2022

## 2022-05-09 ENCOUNTER — CLINICAL SUPPORT (OUTPATIENT)
Dept: REHABILITATION | Facility: OTHER | Age: 52
End: 2022-05-09
Attending: INTERNAL MEDICINE
Payer: MEDICARE

## 2022-05-10 NOTE — PROGRESS NOTES
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 Shona was not able exercise much during that time.  She walked over the weekend with her friend Leyda, they walked at [place] for 30 minutes or so.  Festival international in [place] took place recently and she did not think she could make it but she was able to drive there, hangout for 4 hours and drove back to her brother's house to spend the night.  She is using the D roll when she is in her car and it really helps.  She was happy to be able to help take care of her mother and even cooked a few meals.  Since she got back, it has been hard to walk outside because of the heat.  She is more susceptible to the heat now then she was when she was younger.  Mrs. aNgel has gotten on her recumbent bike twice for about 6 minutes.  She will continue to get on the bike when it is too hot to walk outside.  She is working on sorting out a room full of decorations and such.  She want to do some purging then bringing some of the stuff to a storage unit they rented.  The Pt has been stretching daily and only missed when she was at her mom's house.     Vision: I want to be able to be active and keep up with my friend; to be able to go on active vacations    Strengths: I am good at tracking, any kind of tracking.  I just started tracking my food using [employer]    Challenges: suffers from depression, it is hard to get or stay  motivated when having a hard time    Support: Trip to [place] with my friends to see the brown bears    Hobbies: music, I compose and play    Objective:  Rosa was instructed to jot down exercise sessions on her calendar.  She likes to keep an electronic calendar and likes the idea of adding workouts to that calendar.        INITIAL date 3/14/22  One a scale of 1-10, with 10 being 100% happy, how would you rate your happiness in each of the wellness areas below?    Happiness:         1     2     3     4     5     6     7     8     9     10    Initial Date: 3/14/22 DC Date: +/- Total Change   Exercise/Movement 1     Physical Health 2     Stress Level 4     Nutrition 7     Sleep 2     Play 3     Body Image 1     Relationships 9     Energy/Vitality 5       Assessment:   Patient is feeling good about starting the healthy back program and is hopeful that she can stay consistent with moving and nutrition goal.      Plan:  Patient goals for next consult include: I will walk/ride bike at least 2x/week for at least 15-20 minutes (walk) and 7-8 min (recumbent bike).  I will also stretch every day, Monday - Sunday.        Health : Melisa Rivera  5/10/2022       The patient location is: home  The chief complaint leading to consultation is: follow-up HC session    Visit type: audiovisual    Face to Face time with patient: 30 minutes  30 minutes of total time spent on the encounter, which includes face to face time and non-face to face time preparing to see the patient (eg, review of tests), Obtaining and/or reviewing separately obtained history, Documenting clinical information in the electronic or other health record, Independently interpreting results (not separately reported) and communicating results to the patient/family/caregiver, or Care coordination (not separately reported).     Each patient to whom he or she provides medical services by telemedicine is:  (1) informed of the relationship between the physician and  patient and the respective role of any other health care provider with respect to management of the patient; and (2) notified that he or she may decline to receive medical services by telemedicine and may withdraw from such care at any time.    Notes:

## 2022-05-12 ENCOUNTER — CLINICAL SUPPORT (OUTPATIENT)
Dept: REHABILITATION | Facility: OTHER | Age: 52
End: 2022-05-12
Attending: INTERNAL MEDICINE
Payer: MEDICARE

## 2022-05-12 DIAGNOSIS — M25.69 DECREASED ROM OF TRUNK AND BACK: Primary | ICD-10-CM

## 2022-05-12 DIAGNOSIS — R29.898 DECREASED STRENGTH OF TRUNK AND BACK: ICD-10-CM

## 2022-05-12 PROCEDURE — 97110 THERAPEUTIC EXERCISES: CPT | Mod: CQ

## 2022-05-12 NOTE — PROGRESS NOTES
[facility]      Name: Rosa Lau  Clinic Number: [id number]    Therapy Diagnosis:   Encounter Diagnoses   Name Primary?    Decreased ROM of trunk and back Yes    Decreased strength of trunk and back      Physician: Shavon Price MD    Visit Date: 2022     Physician Orders: PT Eval and Treat   Medical Diagnosis from Referral: DDD (degenerative disc disease), lumbar  Evaluation Date: 2/10/2022  Authorization Period Expiration: M51.36 (ICD-10-CM) - DDD (degenerative disc disease), lumbar  Plan of Care Expiration: 5/10/2022  Visit # / Visits authorized:   (jennifer in [employer])      Time In: 12:00 PM  Time Out: 12:50 PM  Total Billable Time: 45 minutes     Precautions: Standard     Pattern of pain determined: 1 (instability)    Subjective   Rosa denies any lower back pain upon arrival.  She reports she has attempted self massage with a tennis ball for her UT muscle tightness and reports it has helped relived her  pain/discomfort    Patient reports tolerating previous visit well with no complaints of increased soreness  Patient reports their pain to be 0/10 on a 0-10 scale with 0 being no pain and 10 being the worst pain imaginable.  Pain Location: lower back     Occupation: Disability since 2018  Leisure: Hanging out at home, watching TV    Pts goals: Be able to walk 30 minutes with only minimal low back pain.    Objective     Baseline Isometric Testing on Med X equipment: Testing administered by PT  Date of testin/10/2022  ROM 6-36 deg   Max Peak Torque 168    Min Peak Torque 66    Flex/Ext Ratio 2.8:1   % below normative data 11   *24% below at 0 degrees, also likely excess leg push at 36 degrees contributing to large ration    Baseline Isometric Testing on Med X equipment: Testing administered by PT  Date of testin2022  ROM 0-45 deg   Max Peak Torque 131    Min Peak Torque 56    Flex/Ext Ratio 2.5:1   % below normative data  % loss from initial  "27  11      OUTCOMES: FOTO  Initial: 48% limitation  Visit 6: 48%  Visit 10: 47%   Goal: <30%    Treatment    Pt was instructed in and performed the following:     Rosa received therapeutic exercises to develop/improved posture, cardiovascular endurance, muscular endurance, lumbar/cervical ROM, strength and muscular endurance for 45 minutes including the following exercises:     LTR 10x5"  PPT + Bridges + GTB x10  PPT + BKFO with RTB x10  ea  Open Book x 10 each  SOC+no money GTBx 15  Paloff Press w/ GTB x10 ea    Not performed 4/21/22  PPT 10x5"   UT stretch 3 x 20"  Piriformis stretch 3x20"  Seated thoracic extension c/o bolster 10 x5"  Alt SKTC x10 5"    [facility] 5/12/2022   Visit Number 16   VAS Pain Rating 0   Treadmill Time (in min.) -   Speed -   Recumbent Bike - Seat Pos. -   Time 5   Lumbar Stretches - Slouch 10   Flexion in Lying 10   Manual Therapy -   Lumbar Extension - Seat Pad -   Femur Restraint -   Top Dead Center -   Counterweight -   Lumbar Flexion -   Lumbar Extension -   Lumbar Peak Torque -   Lumbar Weight 61   Repetitions 17   Rating of Perceived Exertion 4       Peripheral muscle strengthening which included 1 set of 15-20 repetitions at a slow, controlled 10-13 second per rep pace focused on strengthening supporting musculature for improved body mechanics and functional mobility.  Pt and therapist focused on proper form during treatment to ensure optimal strengthening of each targeted muscle group.  Machines were utilized including torso rotation, leg extension, leg curl, chest press, upright row. Tricep extension, bicep curl, leg press, and hip abduction added visit 3    Rosa received the following manual therapy techniques:  00 minutes.    Home Exercises Provided and Patient Education Provided   Home exercises include:     LTR  DKTC  PPT   Bridge  Clamshells     Cardio program: visit 5, Benefits of cardio discussed 3/17/22 , patient states that she is looking to begin " a walking program with a friend and also states that she has a recumbent bike she will start using.   Lifting education date: Visit 10 Do's and Don'ts of lifting handout provided  Lumbar roll:  Purchased and received in March 2021.     Education provided:   Written Home Exercises Provided: Patient instructed to cont prior HEP. Added Open book 4/26/22.   Exercises were reviewed and Shona was able to demonstrate them prior to the end of the session.  Hope demonstrated good  understanding of the education provided.     See EMR under Patient Instructions for exercises provided prior visit     Assessment   Hope returned continuing to deny any lower back pain and reporting decreased cervical/UT discomfort following at home self massage with tennis ball.  Lumbopelvic mobility and hip/core/glute exercises continued with no adverse effects.  Medx resistance remained at 61#.  She completed 17 reps at a RPE of 4/10. Will monitor and progress per pt's tolerance and HB protocol.    Patient is making Good progress towards established goals.  Pt will continue to benefit from skilled outpatient physical therapy to address the deficits stated in the impairment chart, provide pt/family education and to maximize pt's level of independence in the home and community environment.     Anticipated Barriers for therapy: None  Pt's spiritual, cultural and educational needs considered and pt agreeable to plan of care and goals as stated below:     GOALS: Pt is in agreement with the following goals.     Short term goals:  6 weeks or 10 visits   1.  Pt will demonstrate increased lumbar ROM by at least 9 degrees from the initial ROM value with improvements noted in functional ROM and ability to perform ADLs.  (MET)  2.  Pt will demonstrate increased MedX average isometric strength value  by 25% from initial test resulting in improved ability to perform bending, lifting, and carrying activities safely, confidently.  (partially met)  3.  Patient  report a reduction in worst pain score by 1-2 points for improved tolerance for standing and cooking in her kitchen.  (MET)  4.  Pt able to perform HEP correctly with minimal cueing or supervision from therapist to encourage independent management of symptoms. (MET)      Long term goals: 10 weeks or 20 visits   1. Pt will demonstrate increased lumbar ROM by at least 18 degrees from initial ROM value, resulting in improved ability to perform functional fwd bending while standing and sitting. (approp and ongoing)  2. Pt will demonstrate increased MedX average isometric strength value  by 50% from initial test resulting in improved ability to perform bending, lifting, and carrying activities safely, confidently.  (approp and ongoing)  3. Pt to demonstrate ability to independently control and reduce their pain through posture positioning and mechanical movements throughout a typical day.  (approp and ongoing)  4.  Pt will demonstrate reduced pain and improved functional outcomes as reported on the FOTO by reaching a limitation score of < or = 30% or less in order to demonstrate subjective improvement in pt's condition.    (approp and ongoing)  5. Pt will demonstrate independence with the HEP at discharge  (approp and ongoing)  6.  Patient will report being able to stand for at least 30 minutes with no greater than 2/10 low back pain  (approp and ongoing)    Plan   Continue with established Plan of Care towards established PT goals.     Rodrigue Gorman, PTA  5/12/2022

## 2022-05-18 ENCOUNTER — CLINICAL SUPPORT (OUTPATIENT)
Dept: REHABILITATION | Facility: OTHER | Age: 52
End: 2022-05-18
Attending: INTERNAL MEDICINE
Payer: MEDICARE

## 2022-05-18 DIAGNOSIS — M25.69 DECREASED ROM OF TRUNK AND BACK: Primary | ICD-10-CM

## 2022-05-18 DIAGNOSIS — R29.898 DECREASED STRENGTH OF TRUNK AND BACK: ICD-10-CM

## 2022-05-18 PROCEDURE — 97110 THERAPEUTIC EXERCISES: CPT

## 2022-05-18 NOTE — PROGRESS NOTES
[facility]      Name: Rosa Lau  Clinic Number: [id number]    Therapy Diagnosis:   Encounter Diagnoses   Name Primary?    Decreased ROM of trunk and back Yes    Decreased strength of trunk and back      Physician: Shavon Price MD    Visit Date: 2022     Physician Orders: PT Eval and Treat   Medical Diagnosis from Referral: DDD (degenerative disc disease), lumbar  Evaluation Date: 2/10/2022  Authorization Period Expiration: M51.36 (ICD-10-CM) - DDD (degenerative disc disease), lumbar  Plan of Care Expiration: 5/10/2022  Visit # / Visits authorized:   (jennifer in [employer])      Time In: 12:50 PM  Time Out: 1:44 PM  Total Billable Time: 54 minutes     Precautions: Standard     Pattern of pain determined: 1 (instability)    Subjective   Rosa denies any lower back pain upon arrival, she feels stronger    Patient reports tolerating previous visit well with no complaints of increased soreness  Patient reports their pain to be 0/10 on a 0-10 scale with 0 being no pain and 10 being the worst pain imaginable.  Pain Location: lower back     Occupation: Disability since 2018  Leisure: Hanging out at home, watching TV    Pts goals: Be able to walk 30 minutes with only minimal low back pain.    Objective     Baseline Isometric Testing on Med X equipment: Testing administered by PT  Date of testin/10/2022  ROM 6-36 deg   Max Peak Torque 168    Min Peak Torque 66    Flex/Ext Ratio 2.8:1   % below normative data 11   *24% below at 0 degrees, also likely excess leg push at 36 degrees contributing to large ration    Baseline Isometric Testing on Med X equipment: Testing administered by PT  Date of testin2022  ROM 0-45 deg   Max Peak Torque 131    Min Peak Torque 56    Flex/Ext Ratio 2.5:1   % below normative data  % loss from initial 27  11      OUTCOMES: FOTO  Initial: 48% limitation  Visit 6: 48%  Visit 10: 47%   Goal: <30%    Treatment    Pt was instructed in  "and performed the following:     Rosa received therapeutic exercises to develop/improved posture, cardiovascular endurance, muscular endurance, lumbar/cervical ROM, strength and muscular endurance for 45 minutes including the following exercises:     LTR 10x5"  PPT + Bridges + GTB x10  PPT + BKFO with RTB x10  Ea  +PPT + March 10x  Open Book x 10 each  SOC+no money GTBx 15  Paloff Press w/ GTB x10 ea    Not performed 4/21/22  PPT 10x5"   UT stretch 3 x 20"  Piriformis stretch 3x20"  Seated thoracic extension c/o bolster 10 x5"  Alt SKTC x10 5"    [facility] 5/18/2022   Visit Number 17   VAS Pain Rating 0   Treadmill Time (in min.) -   Speed -   Recumbent Bike Seat Pos. -   Time 5   Lumbar Stretches - Slouch Overcorrection 10   Flexion in Lying 10   Manual Therapy -   Lumbar Extension Seat Pad -   Femur Restraint 6   Top Dead Center -   Counterweight -   Lumbar Flexion -   Lumbar Extension -   Lumbar Peak Torque -   Min Torque -   Test Percent Below Normative Data -   Lumbar Weight 61   Repetitions 18   Rating of Perceived Exertion 4   Ice - Z Lie (in min.) 5         Peripheral muscle strengthening which included 1 set of 15-20 repetitions at a slow, controlled 10-13 second per rep pace focused on strengthening supporting musculature for improved body mechanics and functional mobility.  Pt and therapist focused on proper form during treatment to ensure optimal strengthening of each targeted muscle group.  Machines were utilized including torso rotation, leg extension, leg curl, chest press, upright row. Tricep extension, bicep curl, leg press, and hip abduction added visit 3    Rosa received the following manual therapy techniques:  00 minutes.    Home Exercises Provided and Patient Education Provided   Home exercises include:     LTR  DKTC  PPT   Bridge  Clamshells     Cardio program: visit 5, Benefits of cardio discussed 3/17/22 , patient states that she is looking to begin a walking program with a " friend and also states that she has a recumbent bike she will start using.   Lifting education date: Visit 10 Do's and Don'ts of lifting handout provided  Lumbar roll:  Purchased and received in March 2021.     Education provided:   Written Home Exercises Provided: Patient instructed to cont prior HEP. Added Open book 4/26/22.   Exercises were reviewed and Shona was able to demonstrate them prior to the end of the session.  Hope demonstrated good  understanding of the education provided.     See EMR under Patient Instructions for exercises provided prior visit     Assessment   Hope returned continuing to deny any lower back pain and increase in functional mobility and activity tolerance. She is feeling stronger and was able to clean her home and fold laundry without increased pain. Resumed stretches and added marches with pelvic tilt. Maintained resistance on the lumbar medx at 61 ft lbs, 18 reps and 4/10 RPE.    Patient is making Good progress towards established goals.  Pt will continue to benefit from skilled outpatient physical therapy to address the deficits stated in the impairment chart, provide pt/family education and to maximize pt's level of independence in the home and community environment.     Anticipated Barriers for therapy: None  Pt's spiritual, cultural and educational needs considered and pt agreeable to plan of care and goals as stated below:     GOALS: Pt is in agreement with the following goals.     Short term goals:  6 weeks or 10 visits   1.  Pt will demonstrate increased lumbar ROM by at least 9 degrees from the initial ROM value with improvements noted in functional ROM and ability to perform ADLs.  (MET)  2.  Pt will demonstrate increased MedX average isometric strength value  by 25% from initial test resulting in improved ability to perform bending, lifting, and carrying activities safely, confidently.  (partially met)  3.  Patient report a reduction in worst pain score by 1-2 points for  improved tolerance for standing and cooking in her kitchen.  (MET)  4.  Pt able to perform HEP correctly with minimal cueing or supervision from therapist to encourage independent management of symptoms. (MET)      Long term goals: 10 weeks or 20 visits   1. Pt will demonstrate increased lumbar ROM by at least 18 degrees from initial ROM value, resulting in improved ability to perform functional fwd bending while standing and sitting. (approp and ongoing)  2. Pt will demonstrate increased MedX average isometric strength value  by 50% from initial test resulting in improved ability to perform bending, lifting, and carrying activities safely, confidently.  (approp and ongoing)  3. Pt to demonstrate ability to independently control and reduce their pain through posture positioning and mechanical movements throughout a typical day.  (approp and ongoing)  4.  Pt will demonstrate reduced pain and improved functional outcomes as reported on the FOTO by reaching a limitation score of < or = 30% or less in order to demonstrate subjective improvement in pt's condition.    (approp and ongoing)  5. Pt will demonstrate independence with the HEP at discharge  (approp and ongoing)  6.  Patient will report being able to stand for at least 30 minutes with no greater than 2/10 low back pain  (approp and ongoing)    Plan   Continue with established Plan of Care towards established PT goals.     Pia Lee, PT  5/18/2022

## 2022-05-20 ENCOUNTER — OFFICE VISIT (OUTPATIENT)
Dept: OBSTETRICS AND GYNECOLOGY | Facility: CLINIC | Age: 52
End: 2022-05-20
Attending: OBSTETRICS & GYNECOLOGY
Payer: MEDICARE

## 2022-05-20 DIAGNOSIS — N95.1 MENOPAUSAL SYNDROME: Primary | ICD-10-CM

## 2022-05-20 PROCEDURE — 99213 PR OFFICE/OUTPT VISIT, EST, LEVL III, 20-29 MIN: ICD-10-PCS | Mod: 95,,, | Performed by: OBSTETRICS & GYNECOLOGY

## 2022-05-20 PROCEDURE — 99213 OFFICE O/P EST LOW 20 MIN: CPT | Mod: 95,,, | Performed by: OBSTETRICS & GYNECOLOGY

## 2022-05-20 NOTE — PROGRESS NOTES
The patient location is: [place]  The chief complaint leading to consultation is: follow up HRT    Visit type: audiovisual    Face to Face time with patient: 15 minutes  20 minutes of total time spent on the encounter, which includes face to face time and non-face to face time preparing to see the patient (eg, review of tests), Obtaining and/or reviewing separately obtained history, Documenting clinical information in the electronic or other health record, Independently interpreting results (not separately reported) and communicating results to the patient/family/caregiver, or Care coordination (not separately reported).         Each patient to whom he or she provides medical services by telemedicine is:  (1) informed of the relationship between the physician and patient and the respective role of any other health care provider with respect to management of the patient; and (2) notified that he or she may decline to receive medical services by telemedicine and may withdraw from such care at any time.    Notes: Here for telemedicine visit. Last visit we discussed that she is having periods every 2-3 weeks since on HRT. She feels so much better overall. We discussed going back to OCP last visit. She says she wanted to give it a little longer since she felt so good. Then she had another period. It was 22 days after that last one. We discussed again. Just finished period. Will see what happens and if she bleeds again then she will go back to OCP. Had u/s which showed EMS 5 mm so I am not concerned. All questions answered.

## 2022-05-23 ENCOUNTER — CLINICAL SUPPORT (OUTPATIENT)
Dept: REHABILITATION | Facility: OTHER | Age: 52
End: 2022-05-23
Attending: INTERNAL MEDICINE
Payer: MEDICARE

## 2022-05-23 DIAGNOSIS — R29.898 DECREASED STRENGTH OF TRUNK AND BACK: ICD-10-CM

## 2022-05-23 DIAGNOSIS — M25.69 DECREASED ROM OF TRUNK AND BACK: Primary | ICD-10-CM

## 2022-05-23 PROCEDURE — 97110 THERAPEUTIC EXERCISES: CPT

## 2022-05-23 NOTE — PROGRESS NOTES
"[facility] Treatment      Name: Rosa Lau  Clinic Number: [id number]    Therapy Diagnosis:   Encounter Diagnoses   Name Primary?    Decreased ROM of trunk and back Yes    Decreased strength of trunk and back      Physician: Shavon Price MD    Visit Date: 2022     Physician Orders: PT Eval and Treat   Medical Diagnosis from Referral: DDD (degenerative disc disease), lumbar  Evaluation Date: 2/10/2022  Authorization Period Expiration: M51.36 (ICD-10-CM) - DDD (degenerative disc disease), lumbar  Plan of Care Expiration: UPDATE 22  Visit # / Visits authorized:   (jennifer in [employer])      Time In: 12:30 pm  Time Out: 1:15 pm  Total Billable Time: 45 minutes     Precautions: Standard     Pattern of pain determined: 1 (instability)    Subjective   Rosa reports no LB flare up since last visit. Pt report beginning "spring cleaning" over the weekend resulting in feels "tweak" in R LB but notes she is managing it /c ice and OTC meds.   Pt reports beginning walking program /c friend achieving 30 min bouts.  Pt owns recumbent bike and states she plans on using it for cardio since it is inside.   Pt intends to return for Wellness /p d/c.     Patient reports tolerating previous visit well /c no c/o of excess discomfort   Patient reports their pain to be 2/10 on a 0-10 scale with 0 being no pain and 10 being the worst pain imaginable.  Pain Location: lower back     Occupation: Disability since 2018  Leisure: Hanging out at home, watching TV    Pts goals: Be able to walk 30 minutes with only minimal low back pain.    Objective     Baseline Isometric Testing on Med X equipment: Testing administered by PT  Date of testin/10/2022  ROM 6-36 deg   Max Peak Torque 168    Min Peak Torque 66    Flex/Ext Ratio 2.8:1   % below normative data 11   *24% below at 0 degrees, also likely excess leg push at 36 degrees contributing to large ration    [facility] Isometric Testing on Med X " "equipment: Testing administered by PT  Date of testin2022  ROM 0-45 deg   Max Peak Torque 131    Min Peak Torque 56    Flex/Ext Ratio 2.5:1   % below normative data  % loss from initial 27  11      OUTCOMES: FOTO  Initial: 48% limitation  Visit 6: 48%  Visit 10: 47%   Goal: <30%    Treatment    Pt was instructed in and performed the following:     Rosa received therapeutic exercises to develop/improved posture, cardiovascular endurance, muscular endurance, lumbar/cervical ROM, strength and muscular endurance for 45 minutes including the following exercises:     LTR 10x5"  Piriformis stretch x 3 20 sec B   Open Book x 10 each    PPT x 5 5 sec hold   PPT + Bridges + GTB 2 x10  Marches /c TA brace  x 10  90/90 heel tap /c TA brace x 10     SOC /c no money GTB x 20      Not performed  PPT + BKFO with RTB x10  Ea  UT stretch 3 x 20"  Seated thoracic extension c/o bolster 10 x5"  Alt SKTC x10 5"  Paloff Press w/ GTB x10 ea    [facility] 2022   Visit Number 18   VAS Pain Rating 2   Treadmill Time (in min.) -   Speed -   Recumbent Bike Seat Pos. -   Time 5   Lumbar Stretches - Slouch Overcorrection 20   Flexion in Lying -   Manual Therapy -   Lumbar Extension Seat Pad -   Femur Restraint -   Top Dead Center -   Counterweight -   Lumbar Flexion 48   Lumbar Extension 0   Lumbar Peak Torque -   Min Torque -   Test Percent Below Normative Data -   Lumbar Weight 61   Repetitions 20   Rating of Perceived Exertion 5   Ice - Z Lie (in min.) 5         Peripheral muscle strengthening which included 1 set of 15-20 repetitions at a slow, controlled 10-13 second per rep pace focused on strengthening supporting musculature for improved body mechanics and functional mobility.  Pt and therapist focused on proper form during treatment to ensure optimal strengthening of each targeted muscle group.  Machines were utilized including torso rotation, leg extension, leg curl, chest press, upright row. Tricep extension, " bicep curl, leg press, and hip abduction added visit 3    Rosa received the following manual therapy techniques:  00 minutes.    Home Exercises Provided and Patient Education Provided   Home exercises include:     LTR  DKTC  PPT   Bridge  Open books  SOC    Cardio program: visit 5, Benefits of cardio discussed 3/17/22 , patient states that she is looking to begin a walking program with a friend and also states that she has a recumbent bike she will start using.   Lifting education date: Visit 10 Do's and Don'ts of lifting handout provided  Lumbar roll:  Purchased and received in March 2021.     Education provided:   Written Home Exercises Provided: Patient instructed to cont prior HEP.   Exercises were reviewed and Hope was able to demonstrate them prior to the end of the session.  Hope demonstrated good  understanding of the education provided.     See EMR under Patient Instructions for exercises provided prior visit     Assessment     Pt subjective report indicate improved self efficacy /c attempts at house work.  Pt also demo inc sx self management /c addition of ice for acute sx presentation.  Tx today challenge exercise tolerance /c inc in reps for inc in intensity.  Pt complete exercises noting some dec in LB discomfort indicating appropriateness of cont progression.  Pt advised to cont HEP performance especially /c minor acute discomfort.  Lumbar MedX weight maintained per pt tolerance last visit.   Inc flexion range for mobility challenge.  Today pt perform 20 reps at 5 RPE indicating inc strength and mobility.  Progress per HB protocol.         Patient is making Good progress towards established goals.  Pt will continue to benefit from skilled outpatient physical therapy to address the deficits stated in the impairment chart, provide pt/family education and to maximize pt's level of independence in the home and community environment.     Anticipated Barriers for therapy: None  Pt's spiritual, cultural and  educational needs considered and pt agreeable to plan of care and goals as stated below:     GOALS: Pt is in agreement with the following goals.     Short term goals:  6 weeks or 10 visits   1.  Pt will demonstrate increased lumbar ROM by at least 9 degrees from the initial ROM value with improvements noted in functional ROM and ability to perform ADLs.  (MET)  2.  Pt will demonstrate increased MedX average isometric strength value  by 25% from initial test resulting in improved ability to perform bending, lifting, and carrying activities safely, confidently.  (partially met)  3.  Patient report a reduction in worst pain score by 1-2 points for improved tolerance for standing and cooking in her kitchen.  (MET)  4.  Pt able to perform HEP correctly with minimal cueing or supervision from therapist to encourage independent management of symptoms. (MET)      Long term goals: 10 weeks or 20 visits   1. Pt will demonstrate increased lumbar ROM by at least 18 degrees from initial ROM value, resulting in improved ability to perform functional fwd bending while standing and sitting. (approp and ongoing)  2. Pt will demonstrate increased MedX average isometric strength value  by 50% from initial test resulting in improved ability to perform bending, lifting, and carrying activities safely, confidently.  (approp and ongoing)  3. Pt to demonstrate ability to independently control and reduce their pain through posture positioning and mechanical movements throughout a typical day.  (approp and ongoing)  4.  Pt will demonstrate reduced pain and improved functional outcomes as reported on the FOTO by reaching a limitation score of < or = 30% or less in order to demonstrate subjective improvement in pt's condition.    (approp and ongoing)  5. Pt will demonstrate independence with the HEP at discharge  (approp and ongoing)  6.  Patient will report being able to stand for at least 30 minutes with no greater than 2/10 low back pain   (approp and ongoing)    Plan   Continue with established Plan of Care towards established PT goals.     Kevin Guan, PT  5/23/2022

## 2022-05-23 NOTE — PLAN OF CARE
"    Outpatient Therapy Updated Plan of Care     Visit Date: 2022    Name: Rosa Lau  Clinic Number: [id number]    Therapy Diagnosis:   Encounter Diagnoses   Name Primary?    Decreased ROM of trunk and back Yes    Decreased strength of trunk and back      Physician: Shavon Price MD    Physician Orders: PT Eval and Treat   Medical Diagnosis from Referral: DDD (degenerative disc disease), lumbar  Evaluation Date: 2/10/2022  Authorization Period Expiration: M51.36 (ICD-10-CM) - DDD (degenerative disc disease), lumbar  Plan of Care Expiration: UPDATE 22  Visit # / Visits authorized:   (jennifer in [employer])      Time In: 12:30 pm  Time Out: 1:15 pm  Total Billable Time: 45 minutes     Precautions: Standard     Pattern of pain determined: 1 (instability)    Subjective       Update  Rosa reports no LB flare up since last visit. Pt report beginning "spring cleaning" over the weekend resulting in feels "tweak" in R LB but notes she is managing it /c ice and OTC meds.   Pt reports beginning walking program /c friend achieving 30 min bouts.  Pt owns recumbent bike and states she plans on using it for cardio since it is inside.   Pt intends to return for Wellness /p d/c.     Patient reports tolerating previous visit well /c no c/o of excess discomfort   Patient reports their pain to be 2/10 on a 0-10 scale with 0 being no pain and 10 being the worst pain imaginable.  Pain Location: lower back     Occupation: Disability since 2018  Leisure: Hanging out at home, watching TV    Pts goals: Be able to walk 30 minutes with only minimal low back pain.      Objective     Update:     Baseline Isometric Testing on Med X equipment: Testing administered by PT  Date of testin/10/2022  ROM 6-36 deg   Max Peak Torque 168    Min Peak Torque 66    Flex/Ext Ratio 2.8:1   % below normative data 11   *24% below at 0 degrees, also likely excess leg push at 36 degrees contributing to large ration    [facility] " Isometric Testing on Med X equipment: Testing administered by PT  Date of testin2022  ROM 0-45 deg   Max Peak Torque 131    Min Peak Torque 56    Flex/Ext Ratio 2.5:1   % below normative data  % loss from initial 27  11      OUTCOMES: FOTO  Initial: 48% limitation  Visit 6: 48%  Visit 10: 47%   Goal: <30%        Assessment     Update:     Pt subjective report indicate improved self efficacy /c attempts at house work.  Pt also demo inc sx self management /c addition of ice for acute sx presentation.  Tx today challenge exercise tolerance /c inc in reps for inc in intensity.  Pt complete exercises noting some dec in LB discomfort indicating appropriateness of cont progression.  Pt advised to cont HEP performance especially /c minor acute discomfort.  Lumbar MedX weight maintained per pt tolerance last visit.   Inc flexion range for mobility challenge.  Today pt perform 20 reps at 5 RPE indicating inc strength and mobility.  Progress per HB protocol.         Patient is making Good progress towards established goals.  Pt will continue to benefit from skilled outpatient physical therapy to address the deficits stated in the impairment chart, provide pt/family education and to maximize pt's level of independence in the home and community environment.     Anticipated Barriers for therapy: None  Pt's spiritual, cultural and educational needs considered and pt agreeable to plan of care and goals as stated below:     GOALS: Pt is in agreement with the following goals.     Short term goals:  6 weeks or 10 visits   1.  Pt will demonstrate increased lumbar ROM by at least 9 degrees from the initial ROM value with improvements noted in functional ROM and ability to perform ADLs.  (MET)  2.  Pt will demonstrate increased MedX average isometric strength value  by 25% from initial test resulting in improved ability to perform bending, lifting, and carrying activities safely, confidently.  (partially met)  3.  Patient report  a reduction in worst pain score by 1-2 points for improved tolerance for standing and cooking in her kitchen.  (MET)  4.  Pt able to perform HEP correctly with minimal cueing or supervision from therapist to encourage independent management of symptoms. (MET)      Long term goals: 10 weeks or 20 visits   1. Pt will demonstrate increased lumbar ROM by at least 18 degrees from initial ROM value, resulting in improved ability to perform functional fwd bending while standing and sitting. (approp and ongoing)  2. Pt will demonstrate increased MedX average isometric strength value  by 50% from initial test resulting in improved ability to perform bending, lifting, and carrying activities safely, confidently.  (approp and ongoing)  3. Pt to demonstrate ability to independently control and reduce their pain through posture positioning and mechanical movements throughout a typical day.  (approp and ongoing)  4.  Pt will demonstrate reduced pain and improved functional outcomes as reported on the FOTO by reaching a limitation score of < or = 30% or less in order to demonstrate subjective improvement in pt's condition.    (approp and ongoing)  5. Pt will demonstrate independence with the HEP at discharge  (approp and ongoing)  6.  Patient will report being able to stand for at least 30 minutes with no greater than 2/10 low back pain  (approp and ongoing)      Long Term Goal Status:   continue per initial plan of care.  Reasons for Recertification of Therapy:   To reach 20 visits per [facility] protocol     Plan     Updated Certification Period: 5/23/2022 to 06/23/22  Recommended Treatment Plan:   Outpatient physical therapy 2x week for 4 weeks or 20 visits to include the following:   - Patient education  - Therapeutic exercise  - Manual therapy  - Performance testing   - Neuromuscular Re-education  - Therapeutic activity   - Modalities    Pt may be seen by PTA as part of the rehabilitation team.     Therapist: Kevin  "Roge, PT  5/23/2022    "I certify the need for these services furnished under this plan of treatment and while under my care."    ____________________________________  Physician/Referring Practitioner    _______________  Date of Signature    Kevin Guan, PT  5/23/2022      I CERTIFY THE NEED FOR THESE SERVICES FURNISHED UNDER THIS PLAN OF TREATMENT AND WHILE UNDER MY CARE    Physician's comments:        Physician's Signature: ___________________________________________________      "

## 2022-06-14 ENCOUNTER — PATIENT MESSAGE (OUTPATIENT)
Dept: OBSTETRICS AND GYNECOLOGY | Facility: CLINIC | Age: 52
End: 2022-06-14
Payer: MEDICARE

## 2022-06-14 DIAGNOSIS — N92.4 ABNORMAL PERIMENOPAUSAL BLEEDING: Primary | ICD-10-CM

## 2022-06-16 NOTE — TELEPHONE ENCOUNTER
I called pt and she switched back to OCP because she kept bleeding on HRT even though she feels much better on HRT. Wants to go back to HRT but does not want to have heavy periods all the time. Discussed options. Hysterectomy is an option. Uterus is normal on u/s with EMS 5 mm so unlikely to have pathology. Does have persistent 4 cm cyst on ovary. I recommend we go back to HRT and try doing the prometrium cyclic for 10 days every month and see if that makes the bleeding better. If not then I agree we should just proceed with hysterectomy. Will start that authorization process and schedule in case this plan does not work. Pt agrees.     Requested Date: September 14   Requested Time: 8 am   Case Length:120 minutes    Surgeon: Randa Dumont      Assistant Surgeon: Stefany Curran   Visit Type: Outpatient    PROCEDURE:DVH, BSO  Diagnosis:menorrhagia, ovarian cyst  Anesthesia type: General   Comments/Special Equipment Needed:  Rep needed: yes  Does the patient need a PreOp appointment with MD: yes  U/S needed at pre-op: no  PCP clearance: Not Needed  When does she need her Post op appointment: 2 weeks in person and 8 weeks  Do you need additional time blocked out from clinic? yes  If so, what time do you want to be back in clinic? Blocked out the whole day  When can the patient go back to work? three weeks

## 2022-06-17 ENCOUNTER — PATIENT MESSAGE (OUTPATIENT)
Dept: REHABILITATION | Facility: OTHER | Age: 52
End: 2022-06-17
Payer: MEDICARE

## 2022-06-23 ENCOUNTER — PATIENT MESSAGE (OUTPATIENT)
Dept: OBSTETRICS AND GYNECOLOGY | Facility: CLINIC | Age: 52
End: 2022-06-23
Payer: MEDICARE

## 2022-06-23 ENCOUNTER — CLINICAL SUPPORT (OUTPATIENT)
Dept: REHABILITATION | Facility: OTHER | Age: 52
End: 2022-06-23
Attending: INTERNAL MEDICINE
Payer: MEDICARE

## 2022-06-23 DIAGNOSIS — M25.69 DECREASED ROM OF TRUNK AND BACK: Primary | ICD-10-CM

## 2022-06-23 DIAGNOSIS — R29.898 DECREASED STRENGTH OF TRUNK AND BACK: ICD-10-CM

## 2022-06-23 PROCEDURE — 97110 THERAPEUTIC EXERCISES: CPT

## 2022-06-23 NOTE — PLAN OF CARE
Outpatient Therapy Updated Plan of Care     Visit Date: 2022    Name: Rosa Lau  Clinic Number: [id number]    Therapy Diagnosis:   Encounter Diagnoses   Name Primary?    Decreased ROM of trunk and back Yes    Decreased strength of trunk and back      Physician: Shavon Price MD      Physician Orders: PT Eval and Treat   Medical Diagnosis from Referral: DDD (degenerative disc disease), lumbar  Evaluation Date: 2/10/2022  Authorization Period Expiration: M51.36 (ICD-10-CM) - DDD (degenerative disc disease), lumbar  Plan of Care Expiration: UPDATE 22  Visit # / Visits authorized:   (jennifer in [employer])      Time In: 1:15 pm  Time Out: 2:00 pm  Total Billable Time: 45 minutes     Precautions: Standard     Pattern of pain determined: 1 (instability)    Subjective     Update:  Rosa reports extended time since last visit due to medical conditions requiring time away and surgery in Oct. Pt note no present restrictions given by MD.  Pt note the gyne condition has elevated pain in that region but not the LB. Pt report the new non LB pain has limited her HEP performance but she has attempted HEP 3 x wk and completes recumbent bike rides.  Pt intends to return for Wellness /p d/c.  Pt intends to return to cardio challenges /c her recumbent bike, visiting a friends pool.       Patient reports tolerating previous visit well /c no c/o of excess discomfort   Patient reports their pain to be 3/10 on a 0-10 scale with 0 being no pain and 10 being the worst pain imaginable.  Pain Location: lower back     Occupation: Disability since 2018  Leisure: Hanging out at home, watching TV    Pts goals: Be able to walk 30 minutes with only minimal low back pain.      Objective     Update:       Baseline Isometric Testing on Med X equipment: Testing administered by PT  Date of testin/10/2022  ROM 6-36 deg   Max Peak Torque 168    Min Peak Torque 66    Flex/Ext Ratio 2.8:1   % below normative data 11   *24%  below at 0 degrees, also likely excess leg push at 36 degrees contributing to large ration    [facility] Isometric Testing on Med X equipment: Testing administered by PT  Date of testin2022  ROM 0-45 deg   Max Peak Torque 131    Min Peak Torque 56    Flex/Ext Ratio 2.5:1   % below normative data  % loss from initial 27  11      OUTCOMES: FOTO  Initial: 48% limitation  Visit 6: 48%  Visit 10: 47%   Goal: <30%        Assessment     Update:     Pt extended time since last visit due to gyne medical condition, therefore tx return to last levels to reassess exercise tolerance.  Pt able to complete supine mobility and core stability exercises /s issue and notes slight dec in LB discomfort indicating appropriateness of present POC. Considering extended time since last visit, Lumbar MedX weight maintained for safety and exercise tolerance reassessment.  Today pt perform 16 reps at 5 RPE indicating some decline in exercise tolerance likely from non performance the last month.  Plan lumbar MedX reassessment next visit per HB protocol in anticipation of d/c.  Pt issued handout /c HEP progressions to promote cont HEP performance.         Patient is making Good progress towards established goals.  Pt will continue to benefit from skilled outpatient physical therapy to address the deficits stated in the impairment chart, provide pt/family education and to maximize pt's level of independence in the home and community environment.     Anticipated Barriers for therapy: None  Pt's spiritual, cultural and educational needs considered and pt agreeable to plan of care and goals as stated below:     GOALS: Pt is in agreement with the following goals.     Short term goals:  6 weeks or 10 visits   1.  Pt will demonstrate increased lumbar ROM by at least 9 degrees from the initial ROM value with improvements noted in functional ROM and ability to perform ADLs.  (MET)  2.  Pt will demonstrate increased MedX average isometric strength  value  by 25% from initial test resulting in improved ability to perform bending, lifting, and carrying activities safely, confidently.  (partially met)  3.  Patient report a reduction in worst pain score by 1-2 points for improved tolerance for standing and cooking in her kitchen.  (MET)  4.  Pt able to perform HEP correctly with minimal cueing or supervision from therapist to encourage independent management of symptoms. (MET)      Long term goals: 10 weeks or 20 visits   1. Pt will demonstrate increased lumbar ROM by at least 18 degrees from initial ROM value, resulting in improved ability to perform functional fwd bending while standing and sitting. (approp and ongoing)  2. Pt will demonstrate increased MedX average isometric strength value  by 50% from initial test resulting in improved ability to perform bending, lifting, and carrying activities safely, confidently.  (approp and ongoing)  3. Pt to demonstrate ability to independently control and reduce their pain through posture positioning and mechanical movements throughout a typical day.  (approp and ongoing)  4.  Pt will demonstrate reduced pain and improved functional outcomes as reported on the FOTO by reaching a limitation score of < or = 30% or less in order to demonstrate subjective improvement in pt's condition.    (approp and ongoing)  5. Pt will demonstrate independence with the HEP at discharge  (approp and ongoing)  6.  Patient will report being able to stand for at least 30 minutes with no greater than 2/10 low back pain  (approp and ongoing)      Reasons for Recertification of Therapy:   To meet 20 visits per HB protocol     Plan     Updated Certification Period: 6/23/2022 to 07/08/22  Recommended Treatment Plan:   Outpatient physical therapy 2x week for 2 weeks or 20 visits to include the following:   - Patient education  - Therapeutic exercise  - Manual therapy  - Performance testing   - Neuromuscular Re-education  - Therapeutic activity   -  "Modalities    Pt may be seen by PTA as part of the rehabilitation team.     Therapist: Kevin Guan, PT  6/23/2022    "I certify the need for these services furnished under this plan of treatment and while under my care."    ____________________________________  Physician/Referring Practitioner    _______________  Date of Signature    Kevin Guan, PT  6/23/2022      I CERTIFY THE NEED FOR THESE SERVICES FURNISHED UNDER THIS PLAN OF TREATMENT AND WHILE UNDER MY CARE    Physician's comments:        Physician's Signature: ___________________________________________________      "

## 2022-06-23 NOTE — PROGRESS NOTES
[facility]      Name: Rosa Lau  Clinic Number: [id number]    Therapy Diagnosis:   Encounter Diagnoses   Name Primary?    Decreased ROM of trunk and back Yes    Decreased strength of trunk and back      Physician: Shavon Price MD    Visit Date: 2022     Physician Orders: PT Eval and Treat   Medical Diagnosis from Referral: DDD (degenerative disc disease), lumbar  Evaluation Date: 2/10/2022  Authorization Period Expiration: M51.36 (ICD-10-CM) - DDD (degenerative disc disease), lumbar  Plan of Care Expiration: UPDATE 22  Visit # / Visits authorized:   (jennifer in MEDX)      Time In: 1:15 pm  Time Out: 2:00 pm  Total Billable Time: 45 minutes     Precautions: Standard     Pattern of pain determined: 1 (instability)    Subjective   Rosa reports extended time since last visit due to medical conditions requiring time away and surgery in Oct. Pt note no present restrictions given by MD.  Pt note the gyne condition has elevated pain in that region but not the LB. Pt report the new non LB pain has limited her HEP performance but she has attempted HEP 3 x wk and completes recumbent bike rides.  Pt intends to return for Wellness /p d/c.  Pt intends to return to cardio challenges /c her recumbent bike, visiting a friends pool.       Patient reports tolerating previous visit well /c no c/o of excess discomfort   Patient reports their pain to be 3/10 on a 0-10 scale with 0 being no pain and 10 being the worst pain imaginable.  Pain Location: lower back     Occupation: Disability since 2018  Leisure: Hanging out at home, watching TV    Pts goals: Be able to walk 30 minutes with only minimal low back pain.    Objective     Baseline Isometric Testing on Med X equipment: Testing administered by PT  Date of testin/10/2022  ROM 6-36 deg   Max Peak Torque 168    Min Peak Torque 66    Flex/Ext Ratio 2.8:1   % below normative data 11   *24% below at 0 degrees,  "also likely excess leg push at 36 degrees contributing to large ration    [facility] Isometric Testing on Med X equipment: Testing administered by PT  Date of testin2022  ROM 0-45 deg   Max Peak Torque 131    Min Peak Torque 56    Flex/Ext Ratio 2.5:1   % below normative data  % loss from initial 27  11      OUTCOMES: FOTO  Initial: 48% limitation  Visit 6: 48%  Visit 10: 47%   Goal: <30%    Treatment    Pt was instructed in and performed the following:     Rosa received therapeutic exercises to develop/improved posture, cardiovascular endurance, muscular endurance, lumbar/cervical ROM, strength and muscular endurance for 45 minutes including the following exercises:     LTR 10x5"  Piriformis stretch x 3 20 sec B   Open Book x 10 each    PPT x 5 5 sec hold   PPT + Bridges 2 x10  Marches /c TA brace  x 10  90/90 heel tap /c TA brace x 10     SOC /c no money x 20      Not performed  PPT + BKFO with RTB x10  Ea  UT stretch 3 x 20"  Seated thoracic extension c/o bolster 10 x5"  Alt SKTC x10 5"  Paloff Press w/ GTB x10 ea    [facility] 2022   Visit Number 19   VAS Pain Rating 3   Treadmill Time (in min.) -   Speed -   Recumbent Bike Seat Pos. -   Time -   Lumbar Stretches - Slouch Overcorrection 20   Flexion in Lying -   Manual Therapy -   Lumbar Extension Seat Pad -   Femur Restraint -   Top Dead Center -   Counterweight -   Lumbar Flexion -   Lumbar Extension -   Lumbar Peak Torque -   Min Torque -   Test Percent Below Normative Data -   Lumbar Weight 61   Repetitions 16   Rating of Perceived Exertion 5   Ice - Z Lie (in min.) 5       Peripheral muscle strengthening which included 1 set of 15-20 repetitions at a slow, controlled 10-13 second per rep pace focused on strengthening supporting musculature for improved body mechanics and functional mobility.  Pt and therapist focused on proper form during treatment to ensure optimal strengthening of each targeted muscle group.  Machines were utilized " including torso rotation, leg extension, leg curl, chest press, upright row. Tricep extension, bicep curl, leg press, and hip abduction added visit 3    Rosa received the following manual therapy techniques:  00 minutes.    Home Exercises Provided and Patient Education Provided   Home exercises include:     LTR  DKTC  PPT   Bridge  Open books  SOC    Cardio program: visit 5, Benefits of cardio discussed 3/17/22 , patient states that she is looking to begin a walking program with a friend and also states that she has a recumbent bike she will start using.   Lifting education date: Visit 10 Do's and Don'ts of lifting handout provided  Lumbar roll:  Purchased and received in March 2021.     Education provided:   Written Home Exercises Provided: Patient instructed to cont prior HEP.   Exercises were reviewed and Hope was able to demonstrate them prior to the end of the session.  Hope demonstrated good  understanding of the education provided.     See EMR under Patient Instructions for exercises provided prior visit     Assessment     Pt extended time since last visit due to gyne medical condition, therefore tx return to last levels to reassess exercise tolerance.  Pt able to complete supine mobility and core stability exercises /s issue and notes slight dec in LB discomfort indicating appropriateness of present POC. Considering extended time since last visit, Lumbar MedX weight maintained for safety and exercise tolerance reassessment.  Today pt perform 16 reps at 5 RPE indicating some decline in exercise tolerance likely from non performance the last month.  Plan lumbar MedX reassessment next visit per HB protocol in anticipation of d/c.  Pt issued handout /c HEP progressions to promote cont HEP performance.         Patient is making Good progress towards established goals.  Pt will continue to benefit from skilled outpatient physical therapy to address the deficits stated in the impairment chart, provide pt/family  education and to maximize pt's level of independence in the home and community environment.     Anticipated Barriers for therapy: None  Pt's spiritual, cultural and educational needs considered and pt agreeable to plan of care and goals as stated below:     GOALS: Pt is in agreement with the following goals.     Short term goals:  6 weeks or 10 visits   1.  Pt will demonstrate increased lumbar ROM by at least 9 degrees from the initial ROM value with improvements noted in functional ROM and ability to perform ADLs.  (MET)  2.  Pt will demonstrate increased MedX average isometric strength value  by 25% from initial test resulting in improved ability to perform bending, lifting, and carrying activities safely, confidently.  (partially met)  3.  Patient report a reduction in worst pain score by 1-2 points for improved tolerance for standing and cooking in her kitchen.  (MET)  4.  Pt able to perform HEP correctly with minimal cueing or supervision from therapist to encourage independent management of symptoms. (MET)      Long term goals: 10 weeks or 20 visits   1. Pt will demonstrate increased lumbar ROM by at least 18 degrees from initial ROM value, resulting in improved ability to perform functional fwd bending while standing and sitting. (approp and ongoing)  2. Pt will demonstrate increased MedX average isometric strength value  by 50% from initial test resulting in improved ability to perform bending, lifting, and carrying activities safely, confidently.  (approp and ongoing)  3. Pt to demonstrate ability to independently control and reduce their pain through posture positioning and mechanical movements throughout a typical day.  (approp and ongoing)  4.  Pt will demonstrate reduced pain and improved functional outcomes as reported on the FOTO by reaching a limitation score of < or = 30% or less in order to demonstrate subjective improvement in pt's condition.    (approp and ongoing)  5. Pt will demonstrate  independence with the HEP at discharge  (approp and ongoing)  6.  Patient will report being able to stand for at least 30 minutes with no greater than 2/10 low back pain  (approp and ongoing)    Plan   Continue with established Plan of Care towards established PT goals.     Kevin Guan, PT  6/23/2022

## 2022-06-28 ENCOUNTER — CLINICAL SUPPORT (OUTPATIENT)
Dept: REHABILITATION | Facility: OTHER | Age: 52
End: 2022-06-28
Attending: INTERNAL MEDICINE
Payer: MEDICARE

## 2022-06-28 DIAGNOSIS — M25.69 DECREASED ROM OF TRUNK AND BACK: Primary | ICD-10-CM

## 2022-06-28 DIAGNOSIS — R29.898 DECREASED STRENGTH OF TRUNK AND BACK: ICD-10-CM

## 2022-06-28 PROCEDURE — 97110 THERAPEUTIC EXERCISES: CPT

## 2022-06-28 NOTE — PROGRESS NOTES
[facility]      Name: Rosa Lau  Clinic Number: [id number]    Therapy Diagnosis:   Encounter Diagnoses   Name Primary?    Decreased ROM of trunk and back Yes    Decreased strength of trunk and back      Physician: Shavon Price MD    Visit Date: 2022     Physician Orders: PT Eval and Treat   Medical Diagnosis from Referral: DDD (degenerative disc disease), lumbar  Evaluation Date: 2/10/2022  Authorization Period Expiration: M51.36 (ICD-10-CM) - DDD (degenerative disc disease), lumbar  Plan of Care Expiration: UPDATE 22  Visit # / Visits authorized:   (jennifer in MEDX)      Time In: 0947  Time Out: 1038  Total Billable Time: 51 minutes     Precautions: Standard     Pattern of pain determined: 1 (instability)    Subjective   Rosa arrives to PT and repeorts she has been up all night suffering from an asthma attack, and dealing with it this morning. She has taken her medication. She would like to avoid the cardio exercise today. She denies any LBP today.    Patient reports tolerating previous visit well /c no c/o of excess discomfort   Patient reports their pain to be 0/10 on a 0-10 scale with 0 being no pain and 10 being the worst pain imaginable.  Pain Location: lower back     Occupation: Disability since 2018  Leisure: Hanging out at home, watching TV    Pts goals: Be able to walk 30 minutes with only minimal low back pain.    Objective     Baseline Isometric Testing on Med X equipment: Testing administered by PT  Date of testin/10/2022  ROM 6-36 deg   Max Peak Torque 168    Min Peak Torque 66    Flex/Ext Ratio 2.8:1   % below normative data 11   *24% below at 0 degrees, also likely excess leg push at 36 degrees contributing to large ration    [facility] Isometric Testing on Med X equipment: Testing administered by PT  Date of testin2022  ROM 0-45 deg   Max Peak Torque 131    Min Peak Torque 56    Flex/Ext Ratio 2.5:1   % below normative  "data  % loss from initial 27  11      FINAL (D/C) Isometric Testing on Med X equipment: Testing administered by PT  Date of testin22  ROM 0-48 deg   Max Peak Torque 129   Min Peak Torque 48   Flex/Ext Ratio    Total ROM gain  18 degrees     % Loss  from initial -7% (pt used legs on initial eval) inaccurate comparison .      % below normative data 24     OUTCOMES: FOTO  Initial: 48% limitation  Visit 6: 48%  Visit 10: 47%  Visit 20: 43%     Goal: <30%      Treatment    Pt was instructed in and performed the following:     Rosa received therapeutic exercises to develop/improved posture, cardiovascular endurance, muscular endurance, lumbar/cervical ROM, strength and muscular endurance for 51 minutes including the following exercises:     LTR 10x5"  Piriformis stretch x 3 20 sec B   Open Book 10x5"    PPT and PPT + Bridges 20x5"  Marches /c TA brace  x20 alternating  90/90 heel tap /c TA brace x 10   SOC /c no money x 20    [facility] 2022   Visit Number 20   VAS Pain Rating 0   Lumbar Stretches - Slouch Overcorrection 20   Lumbar Flexion 48   Lumbar Extension 0   Lumbar Peak Torque 129   Min Torque 48   Test Percent Below Normative Data 27   Lumbar Weight 45   Ice - Z Lie (in min.) 5       Peripheral muscle strengthening which included 1 set of 15-20 repetitions at a slow, controlled 10-13 second per rep pace focused on strengthening supporting musculature for improved body mechanics and functional mobility.  Pt and therapist focused on proper form during treatment to ensure optimal strengthening of each targeted muscle group.  Machines were utilized including torso rotation, leg extension, leg curl, chest press, upright row. Tricep extension, bicep curl, leg press, and hip abduction added visit 3    Rosa received the following manual therapy techniques:  00 minutes.    Home Exercises Provided and Patient Education Provided   Home exercises include:     LTR  DKTC  PPT   Bridge  Open " books  SOC    Cardio program: visit 5, Benefits of cardio discussed 3/17/22 , patient states that she is looking to begin a walking program with a friend and also states that she has a recumbent bike she will start using.   Lifting education date: Visit 10 Do's and Don'ts of lifting handout provided  Lumbar roll:  Purchased and received in March 2021.     Education provided:   Written Home Exercises Provided: Patient instructed to cont prior HEP.   Exercises were reviewed and Shona was able to demonstrate them prior to the end of the session.  Shona demonstrated good  understanding of the education provided.     See EMR under Patient Instructions for exercises provided prior visit     Assessment     Shona  has attended 20 visits at [facility] which included MD evaluation, PT evaluation with isometric testing, and physical therapy treatment including HEP instruction, education, aerobic work, dynamic strengthening on med ex equipment for the spine, and whole body strengthening on med ex equipment with increasing weight loads.  Patient  is demonstrating increased ability to reduce symptoms, improved posture, improved   ROM, and improved   strength as follows:    -Improved posture,  using lumbar roll and HEP.  -Improved lumbar ROM on Medx machines, by a total of 18 deg gain.   -Progressed Lumbar extensors strength with exercises, at 24% below normative data for age group. Patient reported improvement with function, with Reduced pain.  Medx results do not reflect pt reports due to inaccurate testing at initial evaluation; pt used her LE to compensate during IM testing.   -Initial outcome tool score of 48% impaired/limitated with function;  and current outcome tool score  43%,  indicating some improved function.      Patient is making Good progress towards established goals.  Pt will continue to benefit from skilled outpatient physical therapy to address the deficits stated in the impairment chart, provide pt/family  education and to maximize pt's level of independence in the home and community environment.     Anticipated Barriers for therapy: None  Pt's spiritual, cultural and educational needs considered and pt agreeable to plan of care and goals as stated below:     GOALS: Pt is in agreement with the following goals.     Short term goals:  6 weeks or 10 visits   1.  Pt will demonstrate increased lumbar ROM by at least 9 degrees from the initial ROM value with improvements noted in functional ROM and ability to perform ADLs.  (MET)  2.  Pt will demonstrate increased MedX average isometric strength value  by 25% from initial test resulting in improved ability to perform bending, lifting, and carrying activities safely, confidently.  (partially met)  3.  Patient report a reduction in worst pain score by 1-2 points for improved tolerance for standing and cooking in her kitchen.  (MET)  4.  Pt able to perform HEP correctly with minimal cueing or supervision from therapist to encourage independent management of symptoms. (MET)      Long term goals: 10 weeks or 20 visits   1. Pt will demonstrate increased lumbar ROM by at least 18 degrees from initial ROM value, resulting in improved ability to perform functional fwd bending while standing and sitting. (MET)  2. Pt will demonstrate increased MedX average isometric strength value  by 50% from initial test resulting in improved ability to perform bending, lifting, and carrying activities safely, confidently.  (Not MET)  3. Pt to demonstrate ability to independently control and reduce their pain through posture positioning and mechanical movements throughout a typical day.   (Progressed)  4.  Pt will demonstrate reduced pain and improved functional outcomes as reported on the FOTO by reaching a limitation score of < or = 30% or less in order to demonstrate subjective improvement in pt's condition.    (Progressed)   5. Pt will demonstrate independence with the HEP at discharge    (Progressed)  6.  Patient will report being able to stand for at least 30 minutes with no greater than 2/10 low back pain  (Progressed)    Plan   Continue with established Plan of Care towards established PT goals.     Eliza Colón, PT  6/28/2022

## 2022-06-28 NOTE — PATIENT INSTRUCTIONS
"HEALTHY BACK TOOLS        KEEP YOUR SPINE FEELING FINE   HEALTHY HABITS   Do your "GO TO" stretches 2/day   Get a good night's REST   Watch your POSTURE in sitting/standing Drink PLENTY of water   Use a lumbar roll Eat LOTS of fruits & vegetables   GET UP often (walk and/or stretch) Manage your STRESS   Make your workplace IDEAL FOR YOU  Don't smoke   Lift correctly EXERCISE                           WHAT TO DO WHEN SYMPTOMS FLARE UP  Back and neck pain may occasionally flare up.  If you experience a flare   up, remember your tools. Be encouraged, by remembering that flare-ups will   usually pass.   My Tools:    ~Use your "Go To" Stretches/Positions   ~Keep Moving-pain usually gets better if you move  ~Z lie (with or without ice)  10 min several times a day until symptoms reduce  ~Slowly resume normal activities   ~Practice Deep Breathing and Relaxation techniques                                                 MY EXERCISE PLAN  GO TO STRETCHES  2/day (like brushing your teeth) STRENGTHENING  2-3 times/week CARDIO PROGRAM  30-45 min; 3-5x/week   Open book 10x5" both sides PPT and PPT + Bridges 20x5" Walking   Piriformis stretch/cross leg pull 3x20" both sides.      Lower Trunk Rotation 10x5" both sides.      Slouch and overcorrect x20. Marches /c TA brace  x20 alternating.      90/90 heel tap /c TA brace  x10  Biking       "

## 2022-07-11 ENCOUNTER — DOCUMENTATION ONLY (OUTPATIENT)
Dept: REHABILITATION | Facility: OTHER | Age: 52
End: 2022-07-11
Attending: INTERNAL MEDICINE
Payer: MEDICARE

## 2022-07-11 NOTE — PROGRESS NOTES
Health  Wellness Visit Note    Name: Rosa Nagel [facility] Number: [id number]  Physician: Shavon Price MD  Diagnosis: No diagnosis found.  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  Reviewed last week's topic of N/A    Subjective:   Patient reports that her lower back pain is at 2/10 today; she is stretching some days of the week.  The Pt has been getting in the pool a few times per week; she does laps on an off for about 3 hours.      Objective:   Rosa completed therapeutic stretches (EIL, GREGG) and the following MedX exercise machines: core lumbar, torso rotation l/r, leg extension, leg curl, upright row, chest press, biceps curl, triceps extension, leg press    See exercise log in patient folder for rate of exertion and repetitions completed.       Fitness Machine Education Key:  E=education on equipment initiated and further follow up and education needed  I=independent with  and exercise.  The patient:   Adjusts machines to his/her settings   Uses equipment levers, pins, weights safely   Maintains safe and correct posture while exercising   Moves through exercise with correct pace and control   Gets on and off equipment safely      Core lumbar strength  Core Torso Rotation  Leg Press    Leg Extension  Seated Leg Curl  Chest Press    Row  Abductor   Hip ADD X   Triceps   Biceps  Other: X       Assessment:   Patient tolerated the Med X Core lumbar strength and all other peripheral exercises without an increase in symptoms.  The Pt warmed up on the recumbent bike for 5 minutes, stretched, and iced lower back at the end of the workout.      Plan:  Continue with established plan of care towards wellness goals.     Health  : Melisa Rivera  7/11/2022

## 2022-07-26 ENCOUNTER — OFFICE VISIT (OUTPATIENT)
Dept: DERMATOLOGY | Facility: CLINIC | Age: 52
End: 2022-07-26
Payer: MEDICARE

## 2022-07-26 DIAGNOSIS — Z12.83 SCREENING FOR SKIN CANCER: ICD-10-CM

## 2022-07-26 DIAGNOSIS — L85.8 KERATOSIS PILARIS: ICD-10-CM

## 2022-07-26 DIAGNOSIS — D18.01 CHERRY ANGIOMA: Primary | ICD-10-CM

## 2022-07-26 DIAGNOSIS — L71.9 ROSACEA: ICD-10-CM

## 2022-07-26 PROCEDURE — 99213 OFFICE O/P EST LOW 20 MIN: CPT | Mod: PBBFAC | Performed by: DERMATOLOGY

## 2022-07-26 PROCEDURE — 99999 PR PBB SHADOW E&M-EST. PATIENT-LVL III: CPT | Mod: PBBFAC,,, | Performed by: DERMATOLOGY

## 2022-07-26 PROCEDURE — 99214 PR OFFICE/OUTPT VISIT, EST, LEVL IV, 30-39 MIN: ICD-10-PCS | Mod: S$PBB,GC,, | Performed by: DERMATOLOGY

## 2022-07-26 PROCEDURE — 99999 PR PBB SHADOW E&M-EST. PATIENT-LVL III: ICD-10-PCS | Mod: PBBFAC,,, | Performed by: DERMATOLOGY

## 2022-07-26 PROCEDURE — 99214 OFFICE O/P EST MOD 30 MIN: CPT | Mod: S$PBB,GC,, | Performed by: DERMATOLOGY

## 2022-07-26 RX ORDER — METRONIDAZOLE 7.5 MG/G
GEL TOPICAL 2 TIMES DAILY
Qty: 45 G | Refills: 10 | Status: SHIPPED | OUTPATIENT
Start: 2022-07-26 | End: 2024-01-03

## 2022-07-26 NOTE — PROGRESS NOTES
"  Subjective:       Patient ID:  Rosa Lau is a 52 y.o. female who presents for   Chief Complaint   Patient presents with    Skin Check     Patient is here today for a "mole" check.   Pt has a history of  extensive sun exposure in the past.   Pt recalls several blistering sunburns in the past- no  Pt has history of tanning bed use- no  Pt has  had moles removed in the past- no  Pt has history of melanoma in first degree relatives-  no    Ms. Lau is a 53yo F with no personal or family hx of skin cancer who presents for a TBSE. There are no spots she is worried about. She does not she has red bumps on her arms and legs and has had this for years and it never goes away. She uses an exfoliative rag when she washes. She also notes when she goes outside and gets hot and in the sun her face flushes and she gets red areas on her cheeks.        Review of Systems   Skin: Positive for activity-related sunscreen use. Negative for daily sunscreen use and recent sunburn.   Hematologic/Lymphatic: Does not bruise/bleed easily.        Objective:    Physical Exam   Constitutional: She appears well-developed and well-nourished.   Neurological: She is alert and oriented to person, place, and time.   Skin:   Areas Examined (abnormalities noted in diagram):   Scalp / Hair Palpated and Inspected  Head / Face Inspection Performed  Neck Inspection Performed  Chest / Axilla Inspection Performed  Abdomen Inspection Performed  Genitals / Buttocks / Groin Inspection Performed  Back Inspection Performed  RUE Inspected  LUE Inspection Performed  RLE Inspected  LLE Inspection Performed              Diagram Legend     Erythematous scaling macule/papule c/w actinic keratosis       Vascular papule c/w angioma      Pigmented verrucoid papule/plaque c/w seborrheic keratosis      Yellow umbilicated papule c/w sebaceous hyperplasia      Irregularly shaped tan macule c/w lentigo     1-2 mm smooth white papules consistent with Milia      Movable " subcutaneous cyst with punctum c/w epidermal inclusion cyst      Subcutaneous movable cyst c/w pilar cyst      Firm pink to brown papule c/w dermatofibroma      Pedunculated fleshy papule(s) c/w skin tag(s)      Evenly pigmented macule c/w junctional nevus     Mildly variegated pigmented, slightly irregular-bordered macule c/w mildly atypical nevus      Flesh colored to evenly pigmented papule c/w intradermal nevus       Pink pearly papule/plaque c/w basal cell carcinoma      Erythematous hyperkeratotic cursted plaque c/w SCC      Surgical scar with no sign of skin cancer recurrence      Open and closed comedones      Inflammatory papules and pustules      Verrucoid papule consistent consistent with wart     Erythematous eczematous patches and plaques     Dystrophic onycholytic nail with subungual debris c/w onychomycosis     Umbilicated papule    Erythematous-base heme-crusted tan verrucoid plaque consistent with inflamed seborrheic keratosis     Erythematous Silvery Scaling Plaque c/w Psoriasis     See annotation      Assessment / Plan:        Cherry angioma  -These are benign growths without a malignant potential. Reassurance given to patient. No treatment is necessary.     Keratosis pilaris  -discussed diagnosis and OTC treatment options including the ones listed below  -list given to patient in AVS  -recommend trying one for at least 1-2 months before switching to a different product  -Recommend using a mild, moisturizing soap as Keratosis pilaris is exacerbated by dry skin.    Discussed [place] apricot scrub or other gently exfoliating wash or gentle use of Buf Puf.  OTC keratolytics (Am lactin, Carmol, Ureamide, Kerasal) recommended for daily use after bath or shower.  Other OTC options:   Bare 40 - amazon $18   Gold bond rough and bumpy skin cream   [place] In Shower Wash Off Lotion   Glytone wash and Cerave SA cream   Glytone KP kit - it's 2 steps ([employer]) - SE: sunburn   Differin every evening, am  lactin every morning    Screening for skin cancer  -no personal or family hx of skin cancer  -no concerning lesions on exam today  -discussed concerning lesions to look out for on home skin checks, such as growing, bleeding or changing lesions and nonhealing wounds or pimples that won't resolve    Rosacea  -     metroNIDAZOLE (METROGEL) 0.75 % gel; Apply topically 2 (two) times daily.  Dispense: 45 g; Refill: 10  -start Metrogel BID to the face  -counseled patient on regular sunscreen use of spf 30 or greater  -discussed triggers such as heat and sun, pamphlet given today on rosacea         Follow up if symptoms worsen or fail to improve.

## 2022-08-15 ENCOUNTER — TELEPHONE (OUTPATIENT)
Dept: ADMINISTRATIVE | Facility: CLINIC | Age: 52
End: 2022-08-15
Payer: MEDICARE

## 2022-08-15 NOTE — TELEPHONE ENCOUNTER
Called pt, no answer; left message informing pt I was calling to remind pt of her in office EAWV on 8/16/22 and to return my call if she had any questions; left my name and number

## 2022-08-31 DIAGNOSIS — I10 ESSENTIAL HYPERTENSION: ICD-10-CM

## 2022-09-09 ENCOUNTER — TELEPHONE (OUTPATIENT)
Dept: ADMINISTRATIVE | Facility: CLINIC | Age: 52
End: 2022-09-09
Payer: MEDICARE

## 2022-09-09 NOTE — TELEPHONE ENCOUNTER
Called pt, no answer; left message informing pt I was calling to remind pt of her in office EAWV on 9/12/22 and to return my call if she had any questions; left my name and number

## 2022-09-12 ENCOUNTER — PATIENT MESSAGE (OUTPATIENT)
Dept: OBSTETRICS AND GYNECOLOGY | Facility: CLINIC | Age: 52
End: 2022-09-12
Payer: MEDICARE

## 2022-09-12 ENCOUNTER — OFFICE VISIT (OUTPATIENT)
Dept: INTERNAL MEDICINE | Facility: CLINIC | Age: 52
End: 2022-09-12
Payer: MEDICARE

## 2022-09-12 VITALS
WEIGHT: 172 LBS | BODY MASS INDEX: 36.11 KG/M2 | HEIGHT: 58 IN | HEART RATE: 77 BPM | SYSTOLIC BLOOD PRESSURE: 110 MMHG | DIASTOLIC BLOOD PRESSURE: 60 MMHG | OXYGEN SATURATION: 98 %

## 2022-09-12 DIAGNOSIS — M1A.9XX0 CHRONIC GOUT INVOLVING TOE OF RIGHT FOOT WITHOUT TOPHUS, UNSPECIFIED CAUSE: ICD-10-CM

## 2022-09-12 DIAGNOSIS — D75.1 POLYCYTHEMIA: ICD-10-CM

## 2022-09-12 DIAGNOSIS — H33.103 RETINOSCHISIS, BILATERAL: ICD-10-CM

## 2022-09-12 DIAGNOSIS — F06.8 COGNITIVE DYSFUNCTION IN BIPOLAR DISORDER: ICD-10-CM

## 2022-09-12 DIAGNOSIS — F32.81 PMDD (PREMENSTRUAL DYSPHORIC DISORDER): ICD-10-CM

## 2022-09-12 DIAGNOSIS — I10 ESSENTIAL HYPERTENSION: ICD-10-CM

## 2022-09-12 DIAGNOSIS — J30.9 ALLERGIC RHINITIS, UNSPECIFIED SEASONALITY, UNSPECIFIED TRIGGER: ICD-10-CM

## 2022-09-12 DIAGNOSIS — K21.9 GASTROESOPHAGEAL REFLUX DISEASE, UNSPECIFIED WHETHER ESOPHAGITIS PRESENT: ICD-10-CM

## 2022-09-12 DIAGNOSIS — M51.36 DDD (DEGENERATIVE DISC DISEASE), LUMBAR: ICD-10-CM

## 2022-09-12 DIAGNOSIS — F41.1 GAD (GENERALIZED ANXIETY DISORDER): ICD-10-CM

## 2022-09-12 DIAGNOSIS — F31.9 BIPOLAR 1 DISORDER: ICD-10-CM

## 2022-09-12 DIAGNOSIS — H32 PRESUMED OCULAR HISTOPLASMOSIS SYNDROME (POHS) OF RIGHT EYE: ICD-10-CM

## 2022-09-12 DIAGNOSIS — G47.33 OSA (OBSTRUCTIVE SLEEP APNEA): ICD-10-CM

## 2022-09-12 DIAGNOSIS — Z00.00 ENCOUNTER FOR PREVENTIVE HEALTH EXAMINATION: Primary | ICD-10-CM

## 2022-09-12 DIAGNOSIS — R41.3 MEMORY LOSS: ICD-10-CM

## 2022-09-12 DIAGNOSIS — Z86.010 HISTORY OF COLONIC POLYPS: ICD-10-CM

## 2022-09-12 DIAGNOSIS — H35.9: ICD-10-CM

## 2022-09-12 DIAGNOSIS — R29.898 DECREASED STRENGTH OF TRUNK AND BACK: ICD-10-CM

## 2022-09-12 DIAGNOSIS — M25.69 DECREASED ROM OF TRUNK AND BACK: ICD-10-CM

## 2022-09-12 DIAGNOSIS — J45.30 MILD PERSISTENT ASTHMA, UNCOMPLICATED: ICD-10-CM

## 2022-09-12 DIAGNOSIS — E78.2 MIXED HYPERLIPIDEMIA: ICD-10-CM

## 2022-09-12 DIAGNOSIS — G44.219 EPISODIC TENSION-TYPE HEADACHE, NOT INTRACTABLE: ICD-10-CM

## 2022-09-12 DIAGNOSIS — E66.9 OBESITY (BMI 35.0-39.9 WITHOUT COMORBIDITY): ICD-10-CM

## 2022-09-12 DIAGNOSIS — F31.9 COGNITIVE DYSFUNCTION IN BIPOLAR DISORDER: ICD-10-CM

## 2022-09-12 DIAGNOSIS — B39.9 PRESUMED OCULAR HISTOPLASMOSIS SYNDROME (POHS) OF RIGHT EYE: ICD-10-CM

## 2022-09-12 DIAGNOSIS — R06.09 DOE (DYSPNEA ON EXERTION): ICD-10-CM

## 2022-09-12 DIAGNOSIS — E66.01 SEVERE OBESITY: ICD-10-CM

## 2022-09-12 DIAGNOSIS — Z91.89 INCREASED RISK OF BREAST CANCER: ICD-10-CM

## 2022-09-12 PROCEDURE — G0439 PR MEDICARE ANNUAL WELLNESS SUBSEQUENT VISIT: ICD-10-PCS | Mod: ,,, | Performed by: NURSE PRACTITIONER

## 2022-09-12 PROCEDURE — 99214 OFFICE O/P EST MOD 30 MIN: CPT | Mod: PBBFAC | Performed by: NURSE PRACTITIONER

## 2022-09-12 PROCEDURE — 99999 PR PBB SHADOW E&M-EST. PATIENT-LVL IV: CPT | Mod: PBBFAC,,, | Performed by: NURSE PRACTITIONER

## 2022-09-12 PROCEDURE — 99999 PR PBB SHADOW E&M-EST. PATIENT-LVL IV: ICD-10-PCS | Mod: PBBFAC,,, | Performed by: NURSE PRACTITIONER

## 2022-09-12 PROCEDURE — G0439 PPPS, SUBSEQ VISIT: HCPCS | Mod: ,,, | Performed by: NURSE PRACTITIONER

## 2022-09-12 NOTE — PATIENT INSTRUCTIONS
Counseling and Referral of Other Preventative  (Italic type indicates deductible and co-insurance are waived)    Patient Name: Rosa Lau  Today's Date: 9/12/2022    Health Maintenance       Date Due Completion Date    Pneumococcal Vaccines (Age 0-64) (1 - PCV) Never done ---    Shingles Vaccine (1 of 2) Never done ---    Influenza Vaccine (1) 09/01/2022 11/19/2021    Cervical Cancer Screening 10/24/2022 10/24/2019    Mammogram 02/22/2023 2/22/2022    Colorectal Cancer Screening 12/08/2025 12/8/2020    Lipid Panel 07/16/2026 7/16/2021    Override on 5/20/2019: Done    TETANUS VACCINE 06/12/2028 6/12/2018        No orders of the defined types were placed in this encounter.    The following information is provided to all patients.  This information is to help you find resources for any of the problems found today that may be affecting your health:                Living healthy guide: www.[facility].[place].[facility]      Understanding Diabetes: www.diabetes.org      Eating healthy: www.cdc.gov/healthyweight      [facility] home safety checklist: www.cdc.gov/steadi/patient.html      Agency on Aging: www.goea.[place].gov      Alcoholics anonymous (AA): www.aa.org      Physical Activity: www.rubén.nih.gov/ip5dqso      Tobacco use: www.quitwithusla.org

## 2022-09-12 NOTE — PROGRESS NOTES
"  Rosa Lau presented for a  Medicare AWV and comprehensive Health Risk Assessment today. The following components were reviewed and updated:    Medical history  Family History  Social history  Allergies and Current Medications  Health Risk Assessment  Health Maintenance  Care Team         ** See Completed Assessments for Annual Wellness Visit within the encounter summary.**         The following assessments were completed:  Living Situation  CAGE  Depression Screening  Timed Get Up and Go  Whisper Test  Cognitive Function Screening  Nutrition Screening  ADL Screening  PAQ Screening          Vitals:    09/12/22 1310   BP: 110/60   BP Location: Left arm   Patient Position: Sitting   Pulse: 77   SpO2: 98%   Weight: 78 kg (172 lb)   Height: 4' 10" (1.473 m)     Body mass index is 35.95 kg/m².    Physical Exam  Vitals reviewed.   Constitutional:       Appearance: She is well-developed.   HENT:      Head: Normocephalic and atraumatic. Not macrocephalic and not microcephalic. No raccoon eyes, Freedman's sign, abrasion, contusion, right periorbital erythema, left periorbital erythema or laceration. Hair is normal.      Right Ear: No decreased hearing noted. No laceration, drainage, swelling or tenderness. No middle ear effusion. No foreign body. No mastoid tenderness. No hemotympanum. Tympanic membrane is not injected, scarred, perforated, erythematous, retracted or bulging. Tympanic membrane has normal mobility.      Left Ear: No decreased hearing noted. No laceration, drainage, swelling or tenderness.  No middle ear effusion. No foreign body. No mastoid tenderness. No hemotympanum. Tympanic membrane is not injected, scarred, perforated, erythematous, retracted or bulging. Tympanic membrane has normal mobility.      Nose: Nose normal. No nasal deformity, laceration or mucosal edema.      Mouth/Throat:      Pharynx: Uvula midline.   Eyes:      General: Lids are normal. No scleral icterus.     Conjunctiva/sclera: " Conjunctivae normal.   Neck:      Thyroid: No thyroid mass or thyromegaly.      Trachea: Trachea normal.   Cardiovascular:      Rate and Rhythm: Normal rate and regular rhythm.   Pulmonary:      Effort: Pulmonary effort is normal. No respiratory distress.      Breath sounds: Normal breath sounds.   Abdominal:      Palpations: Abdomen is soft.   Musculoskeletal:         General: Normal range of motion.      Cervical back: Neck supple. No edema or erythema. No spinous process tenderness or muscular tenderness. Normal range of motion.   Lymphadenopathy:      Head:      Right side of head: No submental, submandibular, tonsillar, preauricular or posterior auricular adenopathy.      Left side of head: No submental, submandibular, tonsillar, preauricular, posterior auricular or occipital adenopathy.   Skin:     General: Skin is warm and dry.   Neurological:      Mental Status: She is alert and oriented to person, place, and time.      Cranial Nerves: No cranial nerve deficit.      Sensory: No sensory deficit.   Psychiatric:         Behavior: Behavior normal.         Thought Content: Thought content normal.         Judgment: Judgment normal.           Diagnoses and health risks identified today and associated recommendations/orders:    1. Encounter for preventive health examination  Annual Health Risk Assessment (HRA) visit today.  Counseling and referral of health maintenance and preventative health measures performed.  Patient given annual wellness paperwork to take home.  Encouraged to return in 1 year for subsequent HRA visit.  -Review for Opioid Screening: Patient does not have rx for Opioids.  -Review for Substance Use Disorders: Patient does not use substance.    2. LIA (generalized anxiety disorder)  Chronic. Stable. Continue current treatment plan as previously prescribed by PCP.    3. Cognitive dysfunction in bipolar disorder  Chronic. Stable. Continue current treatment plan as previously prescribed by PCP.    4.  DDD (degenerative disc disease), lumbar  Chronic. Stable. Continue current treatment plan as previously prescribed by PCP.    5. Memory loss  Chronic. Stable. Continue current treatment plan as previously prescribed by PCP.    6. Episodic tension-type headache, not intractable  Chronic. Stable. Continue current treatment plan as previously prescribed by PCP.    7. Bipolar 1 disorder  Chronic. Stable. Continue current treatment plan as previously prescribed by PCP.    8. Retinoschisis, bilateral  Chronic. Stable. Continue current treatment plan as previously prescribed by PCP.    9. Retinal disease, bilateral  Chronic. Stable. Continue current treatment plan as previously prescribed by PCP.    10. Allergic rhinitis, unspecified seasonality, unspecified trigger  Chronic. Stable. Continue current treatment plan as previously prescribed by PCP.    11. Mild persistent asthma, uncomplicated  Chronic. Stable. Continue current treatment plan as previously prescribed by PCP.    12. Mixed hyperlipidemia  Chronic. Stable. Continue current treatment plan as previously prescribed by PCP.    13. Essential hypertension  Chronic. Stable. Controlled. Encouraged to increase exercise as tolerated (moderate-intensity aerobic activity and muscle-strengthening activities) improve diet to heart healthy, low sodium diet. Continue current treatment plan as previously prescribed by PCP.    14. ELY (dyspnea on exertion)  Chronic. Stable. Continue current treatment plan as previously prescribed by PCP.    15. PMDD (premenstrual dysphoric disorder)  Chronic. Stable. Continue current treatment plan as previously prescribed by PCP.    16. Increased risk of breast cancer  Chronic. Stable. Continue current treatment plan as previously prescribed by PCP.    17. 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 Stable. Continue current treatment plan as previously prescribed by PCP.    18. Polycythemia  Chronic. Stable. Continue  current treatment plan as previously prescribed by PCP.    19. Severe obesity  Chronic. Stable. Encouraged to increase exercise as tolerated and improve diet to heart healthy, low sodium diet. Continue current treatment plan as previously prescribed by PCP.    20. Obesity (BMI 35.0-39.9 without comorbidity)  Chronic. Stable. Continue current treatment plan as previously prescribed by PCP.    21. History of colonic polyps  Chronic. Stable. Continue current treatment plan as previously prescribed by PCP.    22. Decreased ROM of trunk and back  Chronic. Stable. Continue current treatment plan as previously prescribed by PCP.    23. Gastroesophageal reflux disease, unspecified whether esophagitis present  Chronic. Stable. Continue current treatment plan as previously prescribed by PCP.    24. Chronic gout involving toe of right foot without tophus, unspecified cause  Chronic. Stable. Continue current treatment plan as previously prescribed by PCP.    25. Decreased strength of trunk and back  Chronic. Stable. Continue current treatment plan as previously prescribed by PCP.    26. HEATHER (obstructive sleep apnea)  Chronic. Stable. Continue current treatment plan as previously prescribed by PCP.      Provided Rosa with a 5-10 year written screening schedule and personal prevention plan. Recommendations were developed using the USPSTF age appropriate recommendations. Education, counseling, and referrals were provided as needed. After Visit Summary printed and given to patient which includes a list of additional screenings\tests needed.      I offered to discuss end of life issues, including information on how to make advance directives that the patient could use to name someone who would make medical decisions on their behalf if they became too ill to make themselves.    ___Patient declined  _X_Patient is interested, I provided paper work and offered to discuss.    Follow up in about 1 year (around 9/12/2023).    Jack SANDOVAL  AMADA Araujo  I offered to discuss advanced care planning, including how to pick a person who would make decisions for you if you were unable to make them for yourself, called a health care power of , and what kind of decisions you might make such as use of life sustaining treatments such as ventilators and tube feeding when faced with a life limiting illness recorded on a living will that they will need to know. (How you want to be cared for as you near the end of your natural life)     X Patient is interested in learning more about how to make advanced directives.  I provided them paperwork and offered to discuss this with them.

## 2022-09-21 NOTE — TELEPHONE ENCOUNTER
García.   Please move her hysterectomy to November 30th at 7 am. Once moved send to Meme Rabago and Elle Alonzo to schedule    Schedule virtual visit to discuss questions prior to preop per pt request. See message

## 2022-09-22 NOTE — TELEPHONE ENCOUNTER
Verna can you please schedule a virtual appt with Dr. Dumont to discuss surgery on 10/24 at 4:15?

## 2022-10-05 ENCOUNTER — PATIENT MESSAGE (OUTPATIENT)
Dept: ADMINISTRATIVE | Facility: OTHER | Age: 52
End: 2022-10-05
Payer: MEDICARE

## 2022-10-05 ENCOUNTER — OFFICE VISIT (OUTPATIENT)
Dept: FAMILY MEDICINE | Facility: CLINIC | Age: 52
End: 2022-10-05
Payer: MEDICARE

## 2022-10-05 ENCOUNTER — PATIENT MESSAGE (OUTPATIENT)
Dept: INTERNAL MEDICINE | Facility: CLINIC | Age: 52
End: 2022-10-05
Payer: MEDICARE

## 2022-10-05 ENCOUNTER — PATIENT MESSAGE (OUTPATIENT)
Dept: FAMILY MEDICINE | Facility: CLINIC | Age: 52
End: 2022-10-05

## 2022-10-05 ENCOUNTER — PATIENT MESSAGE (OUTPATIENT)
Dept: ADMINISTRATIVE | Facility: CLINIC | Age: 52
End: 2022-10-05
Payer: MEDICARE

## 2022-10-05 DIAGNOSIS — U07.1 COVID-19: Primary | ICD-10-CM

## 2022-10-05 DIAGNOSIS — F41.1 GAD (GENERALIZED ANXIETY DISORDER): ICD-10-CM

## 2022-10-05 DIAGNOSIS — J45.30 MILD PERSISTENT ASTHMA, UNCOMPLICATED: ICD-10-CM

## 2022-10-05 DIAGNOSIS — I10 ESSENTIAL HYPERTENSION: ICD-10-CM

## 2022-10-05 PROCEDURE — 99214 OFFICE O/P EST MOD 30 MIN: CPT | Mod: CR,95,, | Performed by: NURSE PRACTITIONER

## 2022-10-05 PROCEDURE — 99214 PR OFFICE/OUTPT VISIT, EST, LEVL IV, 30-39 MIN: ICD-10-PCS | Mod: CR,95,, | Performed by: NURSE PRACTITIONER

## 2022-10-05 RX ORDER — ALBUTEROL SULFATE 90 UG/1
1-2 AEROSOL, METERED RESPIRATORY (INHALATION) EVERY 6 HOURS PRN
Qty: 18 G | Refills: 5 | Status: SHIPPED | OUTPATIENT
Start: 2022-10-05

## 2022-10-05 RX ORDER — QUETIAPINE FUMARATE 100 MG/1
100 TABLET, FILM COATED ORAL NIGHTLY
COMMUNITY
End: 2022-12-29

## 2022-10-05 RX ORDER — FLUTICASONE PROPIONATE 50 MCG
2 SPRAY, SUSPENSION (ML) NASAL DAILY
Qty: 11.1 ML | Refills: 0 | Status: SHIPPED | OUTPATIENT
Start: 2022-10-05

## 2022-10-05 RX ORDER — BENZONATATE 100 MG/1
100 CAPSULE ORAL 3 TIMES DAILY PRN
Qty: 15 CAPSULE | Refills: 0 | Status: SHIPPED | OUTPATIENT
Start: 2022-10-05 | End: 2022-12-29
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
## 2022-10-05 NOTE — PATIENT INSTRUCTIONS
//////////PHONE NUMBERS//////////    Bariatrics---[phone]  Breast Surgery---[phone]  Case Management---[phone]  Colonoscopy---[phone]  Imaging, Xray, CT, MRI, Ultrasound---[phone]  Infectious Disease---[phone]  Interventional Radiology---[phone]  Medical Records---[phone]  [facility] On Call---[phone]  DME---[phone]  Optometry/Ophthalmology---[phone]  [facility]---[phone]  Physical Therapy---[phone]  Psychiatry---[phone]  Plastic Surgery---[phone]  Sleep Study---[phone]  Smoking Cessation---[phone]  Vaccine Hotline---[phone]  Wound Care---[phone]  COVID Vaccine center---[phone]  Referral Desk---[phone]    /////////////////////////////////////////////////

## 2022-10-05 NOTE — TELEPHONE ENCOUNTER
Pt is asking of what medicine to suggest for she has asthma and have been tested positive. How would you like to proceed?    Danni Pulliam MA  [facility]  [address] Floor [address]  [place], [place].[address]  [phone] office [phone]

## 2022-10-06 ENCOUNTER — PATIENT MESSAGE (OUTPATIENT)
Dept: ADMINISTRATIVE | Facility: OTHER | Age: 52
End: 2022-10-06
Payer: MEDICARE

## 2022-10-06 ENCOUNTER — HOSPITAL ENCOUNTER (EMERGENCY)
Facility: HOSPITAL | Age: 52
Discharge: HOME | End: 2022-10-06
Attending: EMERGENCY MEDICINE
Payer: MEDICARE

## 2022-10-06 ENCOUNTER — NURSE TRIAGE (OUTPATIENT)
Dept: ADMINISTRATIVE | Facility: CLINIC | Age: 52
End: 2022-10-06
Payer: MEDICARE

## 2022-10-06 VITALS
WEIGHT: 172 LBS | HEART RATE: 80 BPM | TEMPERATURE: 99 F | SYSTOLIC BLOOD PRESSURE: 106 MMHG | DIASTOLIC BLOOD PRESSURE: 64 MMHG | OXYGEN SATURATION: 98 % | BODY MASS INDEX: 35.95 KG/M2 | RESPIRATION RATE: 18 BRPM

## 2022-10-06 DIAGNOSIS — R05.9 COUGH, UNSPECIFIED TYPE: ICD-10-CM

## 2022-10-06 DIAGNOSIS — U07.1 COVID-19: Primary | ICD-10-CM

## 2022-10-06 DIAGNOSIS — R11.2 NAUSEA AND VOMITING IN ADULT PATIENT: ICD-10-CM

## 2022-10-06 LAB
ALBUMIN SERPL BCP-MCNC: 3.8 G/DL (ref 3.5–5.2)
ALP SERPL-CCNC: 40 U/L (ref 55–135)
ALT SERPL W/O P-5'-P-CCNC: 12 U/L (ref 10–44)
ANION GAP SERPL CALC-SCNC: 11 MMOL/L (ref 8–16)
AST SERPL-CCNC: 24 U/L (ref 10–40)
BASOPHILS # BLD AUTO: 0.07 K/UL (ref 0–0.2)
BASOPHILS NFR BLD: 0.8 % (ref 0–1.9)
BILIRUB SERPL-MCNC: 0.2 MG/DL (ref 0.1–1)
BILIRUB UR QL STRIP: NEGATIVE
BUN SERPL-MCNC: 9 MG/DL (ref 6–20)
CALCIUM SERPL-MCNC: 9.5 MG/DL (ref 8.7–10.5)
CHLORIDE SERPL-SCNC: 103 MMOL/L (ref 95–110)
CLARITY UR REFRACT.AUTO: CLEAR
CO2 SERPL-SCNC: 24 MMOL/L (ref 23–29)
COLOR UR AUTO: COLORLESS
CREAT SERPL-MCNC: 0.8 MG/DL (ref 0.5–1.4)
D DIMER PPP IA.FEU-MCNC: 0.2 MG/L FEU
DIFFERENTIAL METHOD: ABNORMAL
EOSINOPHIL # BLD AUTO: 0 K/UL (ref 0–0.5)
EOSINOPHIL NFR BLD: 0.5 % (ref 0–8)
ERYTHROCYTE [DISTWIDTH] IN BLOOD BY AUTOMATED COUNT: 12.9 % (ref 11.5–14.5)
EST. GFR  (NO RACE VARIABLE): >60 ML/MIN/1.73 M^2
GLUCOSE SERPL-MCNC: 95 MG/DL (ref 70–110)
GLUCOSE UR QL STRIP: NEGATIVE
HCT VFR BLD AUTO: 43.8 % (ref 37–48.5)
HCV AB SERPL QL IA: NORMAL
HGB BLD-MCNC: 15 G/DL (ref 12–16)
HGB UR QL STRIP: NEGATIVE
HIV 1+2 AB+HIV1 P24 AG SERPL QL IA: NORMAL
IMM GRANULOCYTES # BLD AUTO: 0.03 K/UL (ref 0–0.04)
IMM GRANULOCYTES NFR BLD AUTO: 0.3 % (ref 0–0.5)
KETONES UR QL STRIP: NEGATIVE
LACTATE SERPL-SCNC: 1.1 MMOL/L (ref 0.5–2.2)
LEUKOCYTE ESTERASE UR QL STRIP: NEGATIVE
LYMPHOCYTES # BLD AUTO: 1.2 K/UL (ref 1–4.8)
LYMPHOCYTES NFR BLD: 14.1 % (ref 18–48)
MCH RBC QN AUTO: 30.7 PG (ref 27–31)
MCHC RBC AUTO-ENTMCNC: 34.2 G/DL (ref 32–36)
MCV RBC AUTO: 90 FL (ref 82–98)
MONOCYTES # BLD AUTO: 0.7 K/UL (ref 0.3–1)
MONOCYTES NFR BLD: 7.9 % (ref 4–15)
NEUTROPHILS # BLD AUTO: 6.7 K/UL (ref 1.8–7.7)
NEUTROPHILS NFR BLD: 76.4 % (ref 38–73)
NITRITE UR QL STRIP: NEGATIVE
NRBC BLD-RTO: 0 /100 WBC
PH UR STRIP: 6 [PH] (ref 5–8)
PLATELET # BLD AUTO: 242 K/UL (ref 150–450)
PMV BLD AUTO: 8.8 FL (ref 9.2–12.9)
POTASSIUM SERPL-SCNC: 4.7 MMOL/L (ref 3.5–5.1)
PROT SERPL-MCNC: 7.8 G/DL (ref 6–8.4)
PROT UR QL STRIP: NEGATIVE
RBC # BLD AUTO: 4.88 M/UL (ref 4–5.4)
SODIUM SERPL-SCNC: 138 MMOL/L (ref 136–145)
SP GR UR STRIP: 1.01 (ref 1–1.03)
URN SPEC COLLECT METH UR: ABNORMAL
WBC # BLD AUTO: 8.75 K/UL (ref 3.9–12.7)

## 2022-10-06 PROCEDURE — 80053 COMPREHEN METABOLIC PANEL: CPT | Performed by: EMERGENCY MEDICINE

## 2022-10-06 PROCEDURE — 99284 PR EMERGENCY DEPT VISIT,LEVEL IV: ICD-10-PCS | Mod: CR,,, | Performed by: EMERGENCY MEDICINE

## 2022-10-06 PROCEDURE — 63600175 PHARM REV CODE 636 W HCPCS: Performed by: EMERGENCY MEDICINE

## 2022-10-06 PROCEDURE — 99284 EMERGENCY DEPT VISIT MOD MDM: CPT | Mod: 25

## 2022-10-06 PROCEDURE — 86803 HEPATITIS C AB TEST: CPT | Performed by: PHYSICIAN ASSISTANT

## 2022-10-06 PROCEDURE — 81003 URINALYSIS AUTO W/O SCOPE: CPT | Performed by: EMERGENCY MEDICINE

## 2022-10-06 PROCEDURE — 25000003 PHARM REV CODE 250: Performed by: EMERGENCY MEDICINE

## 2022-10-06 PROCEDURE — 87389 HIV-1 AG W/HIV-1&-2 AB AG IA: CPT | Performed by: PHYSICIAN ASSISTANT

## 2022-10-06 PROCEDURE — 83605 ASSAY OF LACTIC ACID: CPT | Performed by: EMERGENCY MEDICINE

## 2022-10-06 PROCEDURE — 99284 EMERGENCY DEPT VISIT MOD MDM: CPT | Mod: CR,,, | Performed by: EMERGENCY MEDICINE

## 2022-10-06 PROCEDURE — 85025 COMPLETE CBC W/AUTO DIFF WBC: CPT | Performed by: EMERGENCY MEDICINE

## 2022-10-06 PROCEDURE — 96360 HYDRATION IV INFUSION INIT: CPT

## 2022-10-06 PROCEDURE — 85379 FIBRIN DEGRADATION QUANT: CPT | Performed by: EMERGENCY MEDICINE

## 2022-10-06 RX ORDER — PROMETHAZINE HYDROCHLORIDE AND DEXTROMETHORPHAN HYDROBROMIDE 6.25; 15 MG/5ML; MG/5ML
5 SYRUP ORAL EVERY 4 HOURS PRN
Qty: 118 ML | Refills: 0 | Status: SHIPPED | OUTPATIENT
Start: 2022-10-06 | End: 2022-10-16

## 2022-10-06 RX ORDER — GUAIFENESIN/DEXTROMETHORPHAN 100-10MG/5
10 SYRUP ORAL EVERY 4 HOURS PRN
Status: DISCONTINUED | OUTPATIENT
Start: 2022-10-06 | End: 2022-10-06 | Stop reason: HOSPADM

## 2022-10-06 RX ORDER — ONDANSETRON 4 MG/1
4 TABLET, ORALLY DISINTEGRATING ORAL
Status: COMPLETED | OUTPATIENT
Start: 2022-10-06 | End: 2022-10-06

## 2022-10-06 RX ORDER — ONDANSETRON 4 MG/1
4 TABLET, ORALLY DISINTEGRATING ORAL EVERY 6 HOURS PRN
Qty: 21 TABLET | Refills: 0 | OUTPATIENT
Start: 2022-10-06 | End: 2023-02-07

## 2022-10-06 RX ADMIN — ONDANSETRON 4 MG: 4 TABLET, ORALLY DISINTEGRATING ORAL at 01:10

## 2022-10-06 RX ADMIN — SODIUM CHLORIDE, SODIUM LACTATE, POTASSIUM CHLORIDE, AND CALCIUM CHLORIDE 1000 ML: .6; .31; .03; .02 INJECTION, SOLUTION INTRAVENOUS at 01:10

## 2022-10-06 NOTE — ED TRIAGE NOTES
Pt reports she was told to come to ED by Covid surveillance program due to increased symptoms.  She had chills, fingers burning + vomiting.  Covid symptoms started on Sunday and tested + on Monday.  02 sat 97% on room air.  Pt reports that she has taken Tylenol. She is also taking dayquil and nyquil.  + wheezing + asthma, has to take shallow breaths, cough and nasal congestion.        LOC: The patient is awake, alert and aware of environment with an appropriate affect, the patient is oriented x 3 and speaking appropriately.  APPEARANCE: Patient resting comfortably and in no acute distress, patient is clean and well groomed, patient's clothing is properly fastened.  SKIN: The skin is warm and dry, color consistent with ethnicity, patient has normal skin turgor and moist mucus membranes, skin intact, no breakdown or bruising noted.  MUSCULOSKELETAL: Patient moving all extremities spontaneously, no obvious swelling or deformities noted.  RESPIRATORY: Airway is open and patent, respirations are spontaneous, patient has a normal effort and rate, no accessory muscle use noted.  ABDOMEN: Soft and non tender to palpation, no distention noted.

## 2022-10-06 NOTE — TELEPHONE ENCOUNTER
"[place]    PCP:  Dr. Shavon Price    Per Covid Surveillance Program, pt escalated for abnormal symptoms; worsening symptoms.  C/O fingers "burning" and not able to use them that resolved after a little while, chills, sweats, fever, and vomiting.  She reports that her wife also has Covid.  She denies any finger burning at this time.  Denies SOB and CP.  Per protocol, care advised is go to ED now.  Advised to take a current list of your meds with you when you go to the ED, it's a good idea to take the pill bottles with you when you go to the ED so that the MD can make sure that your taking the right meds and right doses, it's better and safer to have another adult drive you there instead of yourself, if private transportation is not available to the ED then call , and notify the first personnel you meet at the hospital that you have Covid so that they can protect themselves and other patients.  Instructed to call for worsening/questions/concerns.  VU.     Reason for Disposition   MODERATE difficulty breathing (e.g., speaks in phrases, SOB even at rest, pulse 100-120)    Additional Information   Negative: SEVERE difficulty breathing (e.g., struggling for each breath, speaks in single words)   Negative: Difficult to awaken or acting confused (e.g., disoriented, slurred speech)   Negative: Bluish (or gray) lips or face now   Negative: Shock suspected (e.g., cold/pale/clammy skin, too weak to stand, low BP, rapid pulse)   Negative: Sounds like a life-threatening emergency to the triager   Negative: SEVERE or constant chest pain or pressure  (Exception: Mild central chest pain, present only when coughing.)    Protocols used: Coronavirus (COVID-19) Diagnosed or [id number]    "

## 2022-10-06 NOTE — TELEPHONE ENCOUNTER
Pt escalated due to no response. Pt noted to be in ED at this time.No further outreach made at this time.    Reason for Disposition   Caller has cancelled the call before the first contact    Protocols used: No Contact or Duplicate Contact Call-A-[place]
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
## 2022-10-07 ENCOUNTER — PATIENT MESSAGE (OUTPATIENT)
Dept: ADMINISTRATIVE | Facility: CLINIC | Age: 52
End: 2022-10-07
Payer: MEDICARE

## 2022-10-07 ENCOUNTER — PATIENT MESSAGE (OUTPATIENT)
Dept: ADMINISTRATIVE | Facility: OTHER | Age: 52
End: 2022-10-07
Payer: MEDICARE

## 2022-10-07 ENCOUNTER — NURSE TRIAGE (OUTPATIENT)
Dept: ADMINISTRATIVE | Facility: CLINIC | Age: 52
End: 2022-10-07
Payer: MEDICARE

## 2022-10-07 NOTE — TELEPHONE ENCOUNTER
Pt called in response to CV no response escalation. States she was sleeping. States she is improved today. States she will take VS and submit shortly. States she has not received notification that her RX that she got in the ED are ready- states she looked it up in her portal and that she was supposed to receive written RX. Pt advised to check her ED d/c papers as the RX are often attached- pt found RX with papers. No additional concerns or questions at this time. Advised to call back with concerns or worsening symptoms. Verbalized understanding.   Reason for Disposition   Information only question and nurse able to answer    Additional Information   Negative: Nursing judgment   Negative: Nursing judgment   Negative: Nursing judgment   Negative: Nursing judgment    Protocols used: Information Only Call - No Triage-[facility]

## 2022-10-08 ENCOUNTER — PATIENT MESSAGE (OUTPATIENT)
Dept: ADMINISTRATIVE | Facility: OTHER | Age: 52
End: 2022-10-08
Payer: MEDICARE

## 2022-10-08 ENCOUNTER — NURSE TRIAGE (OUTPATIENT)
Dept: ADMINISTRATIVE | Facility: CLINIC | Age: 52
End: 2022-10-08
Payer: MEDICARE

## 2022-10-08 ENCOUNTER — PATIENT MESSAGE (OUTPATIENT)
Dept: ADMINISTRATIVE | Facility: CLINIC | Age: 52
End: 2022-10-08
Payer: MEDICARE

## 2022-10-08 NOTE — TELEPHONE ENCOUNTER
Surveillance pt escalated for no response. Pt stated she had just woken up and she would be entering VS soon.  Pt denies cough or CP.    Reason for Disposition   General information question, no triage required and triager able to answer question    Additional Information   Negative: [1] Caller is not with the adult (patient) AND [2] reporting urgent symptoms   Negative: Lab result questions   Negative: Medication questions   Negative: Caller can't be reached by phone   Negative: Caller has already spoken to PCP or another triager   Negative: RN needs further essential information from caller in order to complete triage   Negative: Requesting regular office appointment   Negative: [1] Caller requesting NON-URGENT health information AND [2] PCP's office is the best resource   Negative: Health Information question, no triage required and triager able to answer question    Protocols used: Information Only Call - [facility]

## 2022-10-09 ENCOUNTER — PATIENT MESSAGE (OUTPATIENT)
Dept: ADMINISTRATIVE | Facility: CLINIC | Age: 52
End: 2022-10-09
Payer: MEDICARE

## 2022-10-09 ENCOUNTER — PATIENT MESSAGE (OUTPATIENT)
Dept: ADMINISTRATIVE | Facility: OTHER | Age: 52
End: 2022-10-09
Payer: MEDICARE

## 2022-10-10 ENCOUNTER — PATIENT MESSAGE (OUTPATIENT)
Dept: ADMINISTRATIVE | Facility: CLINIC | Age: 52
End: 2022-10-10
Payer: MEDICARE

## 2022-10-10 ENCOUNTER — PATIENT MESSAGE (OUTPATIENT)
Dept: ADMINISTRATIVE | Facility: OTHER | Age: 52
End: 2022-10-10
Payer: MEDICARE

## 2022-10-11 ENCOUNTER — PATIENT MESSAGE (OUTPATIENT)
Dept: ADMINISTRATIVE | Facility: OTHER | Age: 52
End: 2022-10-11
Payer: MEDICARE

## 2022-10-11 ENCOUNTER — PATIENT MESSAGE (OUTPATIENT)
Dept: ADMINISTRATIVE | Facility: CLINIC | Age: 52
End: 2022-10-11
Payer: MEDICARE

## 2022-10-12 ENCOUNTER — PATIENT MESSAGE (OUTPATIENT)
Dept: ADMINISTRATIVE | Facility: OTHER | Age: 52
End: 2022-10-12
Payer: MEDICARE

## 2022-10-12 ENCOUNTER — PATIENT MESSAGE (OUTPATIENT)
Dept: ADMINISTRATIVE | Facility: CLINIC | Age: 52
End: 2022-10-12
Payer: MEDICARE

## 2022-10-12 ENCOUNTER — NURSE TRIAGE (OUTPATIENT)
Dept: ADMINISTRATIVE | Facility: CLINIC | Age: 52
End: 2022-10-12
Payer: MEDICARE

## 2022-10-12 NOTE — TELEPHONE ENCOUNTER
Surveillance pt escalated for no response. Pt stated she would be entering VS soon and that everyday she feels a little bit better.  No triage.    Reason for Disposition   Information only question and nurse able to answer    Additional Information   Negative: Nursing judgment   Negative: Nursing judgment   Negative: Nursing judgment   Negative: Nursing judgment    Protocols used: Information Only Call - No Triage-[facility]

## 2022-10-13 ENCOUNTER — NURSE TRIAGE (OUTPATIENT)
Dept: ADMINISTRATIVE | Facility: CLINIC | Age: 52
End: 2022-10-13
Payer: MEDICARE

## 2022-10-13 ENCOUNTER — PATIENT MESSAGE (OUTPATIENT)
Dept: ADMINISTRATIVE | Facility: CLINIC | Age: 52
End: 2022-10-13
Payer: MEDICARE

## 2022-10-13 NOTE — TELEPHONE ENCOUNTER
Pt called for no response to cc push.    Pt notified that she has not responded to cc push. Pt said she is feeling better and would like to opt out of program. Pt invited to call ooc at 1 [phone] at anytime to speak with a nurse if she has any questions or concerns in the future. Alert and oriented, Pt agrees with above and denies having any symptoms that need to be triaged nor questions at this time.    Encounter routed to pcp.  Reason for Disposition   Information only question and nurse able to answer    Protocols used: Information Only Call - No Triage-[facility]

## 2022-10-24 ENCOUNTER — OFFICE VISIT (OUTPATIENT)
Dept: OBSTETRICS AND GYNECOLOGY | Facility: CLINIC | Age: 52
End: 2022-10-24
Attending: OBSTETRICS & GYNECOLOGY
Payer: MEDICARE

## 2022-10-24 ENCOUNTER — PATIENT MESSAGE (OUTPATIENT)
Dept: SURGERY | Facility: OTHER | Age: 52
End: 2022-10-24
Payer: MEDICARE

## 2022-10-24 DIAGNOSIS — N92.4 ABNORMAL PERIMENOPAUSAL BLEEDING: Primary | ICD-10-CM

## 2022-10-24 PROCEDURE — 99213 OFFICE O/P EST LOW 20 MIN: CPT | Mod: 95,,, | Performed by: OBSTETRICS & GYNECOLOGY

## 2022-10-24 PROCEDURE — 99213 PR OFFICE/OUTPT VISIT, EST, LEVL III, 20-29 MIN: ICD-10-PCS | Mod: 95,,, | Performed by: OBSTETRICS & GYNECOLOGY

## 2022-10-24 NOTE — PROGRESS NOTES
The patient location is: home in [place]   The chief complaint leading to consultation is: discuss surgery    Visit type: audiovisual    Face to Face time with patient: 15 minutes  20 minutes of total time spent on the encounter, which includes face to face time and non-face to face time preparing to see the patient (eg, review of tests), Obtaining and/or reviewing separately obtained history, Documenting clinical information in the electronic or other health record, Independently interpreting results (not separately reported) and communicating results to the patient/family/caregiver, or Care coordination (not separately reported).         Each patient to whom he or she provides medical services by telemedicine is:  (1) informed of the relationship between the physician and patient and the respective role of any other health care provider with respect to management of the patient; and (2) notified that he or she may decline to receive medical services by telemedicine and may withdraw from such care at any time.    Notes: Here for virtual visit to discuss surgery.  Patient is has been having perimenopausal bleeding.  Due to the severity of her menopausal symptoms she has been taking estrogen and progesterone which has relieved her symptoms.  However despite adjusting the hormones in various ways she still continued to have intermittent bleeding.  Right now we are doing cyclic Prometrium which is giving her a heavy 7 day cycle without intermenstrual bleeding.  We have been discussing a hysterectomy for quite some time.  She was originally scheduled in the summer and has postponed till November due to financial and other reasons.  She is here now to discuss the surgery in detail.  All questions were answered.  She is considering postponing till 2023 again for financial reasons.  I explained to the patient if that is the case then I would recommend doing an endometrial biopsy even though her endometrial stripe was 5  mm on the last ultrasound.  Due to her irregular bleeding and being perimenopausal.  Patient will call the central pricing office and let me know this week if she plans to keep her surgery scheduled for November or postponed till January.  If she postpones we will schedule her for EMB in the office.  All questions answered.

## 2022-11-01 ENCOUNTER — OFFICE VISIT (OUTPATIENT)
Dept: INTERNAL MEDICINE | Facility: CLINIC | Age: 52
End: 2022-11-01
Payer: MEDICARE

## 2022-11-01 VITALS
DIASTOLIC BLOOD PRESSURE: 80 MMHG | WEIGHT: 176.81 LBS | HEIGHT: 58 IN | SYSTOLIC BLOOD PRESSURE: 120 MMHG | BODY MASS INDEX: 37.11 KG/M2 | OXYGEN SATURATION: 99 % | HEART RATE: 84 BPM

## 2022-11-01 DIAGNOSIS — I10 ESSENTIAL HYPERTENSION: Primary | ICD-10-CM

## 2022-11-01 DIAGNOSIS — D75.1 POLYCYTHEMIA: ICD-10-CM

## 2022-11-01 DIAGNOSIS — Z23 INFLUENZA VACCINE NEEDED: ICD-10-CM

## 2022-11-01 DIAGNOSIS — E66.9 OBESITY (BMI 35.0-39.9 WITHOUT COMORBIDITY): ICD-10-CM

## 2022-11-01 PROCEDURE — 99999 PR PBB SHADOW E&M-EST. PATIENT-LVL IV: CPT | Mod: PBBFAC,,, | Performed by: INTERNAL MEDICINE

## 2022-11-01 PROCEDURE — 99215 OFFICE O/P EST HI 40 MIN: CPT | Mod: S$PBB,,, | Performed by: INTERNAL MEDICINE

## 2022-11-01 PROCEDURE — 99214 OFFICE O/P EST MOD 30 MIN: CPT | Mod: PBBFAC | Performed by: INTERNAL MEDICINE

## 2022-11-01 PROCEDURE — 99999 PR PBB SHADOW E&M-EST. PATIENT-LVL IV: ICD-10-PCS | Mod: PBBFAC,,, | Performed by: INTERNAL MEDICINE

## 2022-11-01 PROCEDURE — 99215 PR OFFICE/OUTPT VISIT, EST, LEVL V, 40-54 MIN: ICD-10-PCS | Mod: S$PBB,,, | Performed by: INTERNAL MEDICINE

## 2022-11-01 RX ORDER — HYDROCHLOROTHIAZIDE 12.5 MG/1
12.5 CAPSULE ORAL DAILY
Qty: 90 CAPSULE | Refills: 3 | Status: SHIPPED | OUTPATIENT
Start: 2022-11-01

## 2022-11-01 NOTE — PROGRESS NOTES
Subjective:       Patient ID: Rosa Lau is a 52 y.o. female who  has a past medical history of Bipolar disorder, Genetic testing (02/16/2022), Hyperlipidemia, Hypertension, and Macular degeneration of both eyes.    Chief Complaint: Follow-up     History was obtained from the patient and supplemented through chart review   -Following adolfo MOSS for AUB.    Her wife is Shavon Lau.    HPI    Received COVID bivalent booster vaccine recently.  +COVID 10/2022.     HTN:    Pt's BP is controlled on HCTZ 12.5. Also on propranolol for anxiety/bipolar d/o. Tolerating meds well.   +HEATHER as below.    Obesity:    Exercise:  Has a stationary bike. H/o chronic pain.  Rheumatology recommended low-impact exercises.   Completed healthy back program. Doing much better! Chronic pain has significantly improved.  Had some setbacks d/t her wife who had a vishal recently. Pt recently had COVID. Her friend has a pool, which she has been doing 2/week. Has improved mental health, appetite. Plans to join a gym in the winter.    Diet:  H/o stress eating. Appetite has improved with exercise. Working on portions. No junk foods.  BMI Readings from Last 3 Encounters:   11/01/22 36.95 kg/m²   10/06/22 35.95 kg/m²   09/12/22 35.95 kg/m²     Lab Results   Component Value Date/Time    HGBA1C 5.0 07/23/2020 09:03 AM     Polycythemia:  No tobacco use, dehydration.  No family history of polycythemia, but MGM had early stroke at age 35.  Following with Heme-Onc.  Thought to be secondary due to elevated EPO.  JAK2, MPN panel neg. Tachycardia was noted during visit and she had SOB.  D-dimer positive, but CT angio without PE.  Recommended to discontinue OCPs.  Repeat PSG confirmed HEATHER. Polycythemia likely 2/2 sleep apnea, not malignant or prothrombic.     Discussed c Heme Onc, Dr. Camacho. Unlikely to be polycythema vera given neg Jak2. Epo was elevated, so rec r/o epo-secreting tumor such as RCC. CT A/P with normal kidneys, adrenal glands.    Heme Onc ok  with estrogen for menopausal sx.    HEATHER as below.             Not addressed today.  HLD:  Is currently taking fenofibrate. Diet as above.   FLP improved, controlled.  Continue fenofibrate. Monitor FLP annually.  Lab Results   Component Value Date    LDLCALC 86.8 07/16/2021     The 10-year ASCVD risk score (Marilu DON, et al., 2019) is: 1.5%    Values used to calculate the score:      Age: 52 years      Sex: Female      Is Non- : No      Diabetic: No      Tobacco smoker: No      Systolic Blood Pressure: 120 mmHg      Is BP treated: Yes      HDL Cholesterol: 40 mg/dL      Total Cholesterol: 150 mg/dL    HEATHER:  +HA. Repeat PSG confirmed moderate HEATHER.  Following with sleep clinic.  Not able to use the CPAP for very long d/t insomnia.  Chronic insomnia.  Has peristent daytime exhaustion. Falling asleep during inappropriate times. Is on Nuvigil without relief.  Tried lunesta. Discussed sleep hygiene.  Following c OSH Psych.   Contributing to HA, fatigue, and polycythemia. F/u sleep clinic, Psych. Cont CPAP as tolerated.    Asthma:  Uses her rescue inhaler about twice a week and symbicort daily.  Takes Allegra.  Worse c hot weather.   Refilled albuterol inhaler.    HA:   Gradual onset HA.  Wakes up with it and worsens throughout the day. HA is also worse when walking, putting away groceries. It is not worsened by recumbency.  No rhinorrhea, but does have postnasal drip.  Takes Allegra daily.  Headache resolved with Zanaflex, Medrol Dosepak.  Also on CPAP for HEATHER.  MRI brain 3/2021 s acute abnormality.  Improved. AR, HEATHER, neck pain contributing. Cont CPAP. Antihisamine, muscle relaxant PRN, neck stretches.    AR:  Likely contributing to HA as above.  Continue antihistamine.  No boggy turbinates on exam.    GERD:  No EGD before.  Used to take PPI daily, but now able to take Protonix PRN with improvement.  Doing well. Discussed avoiding fatty, large meals, acidic foods. Cont PPI PRN. F/u c [facility].    History of colon polyp:  +FHx colon ca.   Cscope c [facility] 12/2020 normal. Repeat in 5 years, 12/2025.    Perimenopausal bleeding:   Gyn started Prometrium for menopause.   US c L ovarian cyst? Not well visualized on US.   Long and frequent menstrual bleeding. Thickened endometrial stripe. Planning on EMBx. Considering hyst in 1/2023.    Elevated TC score:  GM with breast ca.  Saw breast clinic and discussed genetic testing.     Bipolar disorder, LIA:  Diagnosed at age 20. Had mild cognitive impairment through neuropsych testing. Follows with OSH Psychiatry (Dr. Selena Weinstein, [facility]) and therapy. Adjusting meds. On Nuvigil  F/u with OSH Psych, therapy. Adjusting meds.     Mild cognitive impairment:  Trouble with short-term memory, concentration.  Follows with Neurology.  MRI brain without acute abnormality.  Thought to be from psychiatric medications.    Polyarthralgia, LBP, DDD, L shoulder pain:  Has back pain with movement. Saw OSH ortho. Had xrays c/w DDD. Rec PT which was very expensive, so had to stop. Has home exercises.     Saw OSH Rheum Dr. Escobar who seemed to focus more on mental health and dismissed FMA.    OSH Labs 7/2021 ESR/CRP neg. RF, VIRAL neg. HLA B27.  CK, uric acid wnl.     Had severe pain from gout of the R great toe that subsided.  Saw [facility] as well. Thought to be unusual for gout given age. Rec appt if it recurs.  Encouraged low-impact exercise.    FHx: MGM with RA.    Doing much better.  Completed Healthy Back program. Seeing psych/therapy, Rheumatology.    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  She follows with optho. Takes eye supplements (AREDS, fish oil, B complex, CoQ 10, carnitine). Goes to [facility]. Vision progressively worsening.     Follows c [facility] for TBSE, rosacea.    Review of Systems   Constitutional:  Negative for activity change and appetite change.  "  Respiratory:  Negative for wheezing.    Cardiovascular:  Negative for leg swelling.   Musculoskeletal:  Positive for back pain. Negative for gait problem.   Skin:  Negative for rash and wound.   Hematological:  Negative for adenopathy.   Psychiatric/Behavioral:  Negative for confusion.        I personally reviewed Past Medical History, Past Surgical History, Social History, and Family History.    Objective:      Vitals:    11/01/22 1005   BP: 120/80   BP Location: Right arm   Patient Position: Sitting   BP Method: Large (Manual)   Pulse: 84   SpO2: 99%   Weight: 80.2 kg (176 lb 12.9 oz)   Height: 4' 10" (1.473 m)        Physical Exam  Constitutional:       General: She is not in acute distress.     Appearance: She is well-developed. She is not diaphoretic.   HENT:      Head: Normocephalic and atraumatic.   Eyes:      General: No scleral icterus.        Right eye: No discharge.         Left eye: No discharge.   Neck:      Thyroid: No thyromegaly.      Trachea: No tracheal deviation.   Cardiovascular:      Rate and Rhythm: Normal rate and regular rhythm.      Heart sounds: Normal heart sounds. No murmur heard.  Pulmonary:      Effort: Pulmonary effort is normal. No respiratory distress.      Breath sounds: Normal breath sounds. No wheezing.   Abdominal:      General: Bowel sounds are normal. There is no distension.      Palpations: Abdomen is soft.      Tenderness: There is no abdominal tenderness.   Musculoskeletal:         General: No deformity.      Cervical back: Neck supple.      Right lower leg: No edema.      Left lower leg: No edema.   Lymphadenopathy:      Cervical: No cervical adenopathy.   Skin:     General: Skin is warm and dry.      Findings: No erythema.   Neurological:      Mental Status: She is alert.      Gait: Gait normal.   Psychiatric:         Behavior: Behavior normal.         Lab Results   Component Value Date    WBC 8.75 10/06/2022    HGB 15.0 10/06/2022    HCT 43.8 10/06/2022     " 10/06/2022    CHOL 150 07/16/2021    TRIG 116 07/16/2021    HDL 40 07/16/2021    ALT 12 10/06/2022    AST 24 10/06/2022     10/06/2022    K 4.7 10/06/2022     10/06/2022    CREATININE 0.8 10/06/2022    BUN 9 10/06/2022    CO2 24 10/06/2022    TSH 2.001 01/25/2022    HGBA1C 5.0 07/23/2020       The 10-year ASCVD risk score (Marilu DON, et al., 2019) is: 1.5%    Values used to calculate the score:      Age: 52 years      Sex: Female      Is Non- : No      Diabetic: No      Tobacco smoker: No      Systolic Blood Pressure: 120 mmHg      Is BP treated: Yes      HDL Cholesterol: 40 mg/dL      Total Cholesterol: 150 mg/dL    (Imaging have been independently reviewed)  CXR without acute abnormality.    Assessment:       1. Essential hypertension    2. Obesity (BMI 35.0-39.9 without comorbidity)    3. Polycythemia    4. Influenza vaccine needed            Plan:       Rosa was seen today for follow-up.    Diagnoses and all orders for this visit:    Essential hypertension  Comments:  Controlled. Cont HCTZ 12.5, propranolol for mood.   Orders:  -     hydroCHLOROthiazide (MICROZIDE) 12.5 mg capsule; Take 1 capsule (12.5 mg total) by mouth once daily.    Obesity (BMI 35.0-39.9 without comorbidity)  Comments:  Stable. Cont exercise as tolerated. Improved diet.    Polycythemia  Comments:  Improved. Cont CPAP as tolerated. If persistent polycythemia, will resched Heme Onc for BMBx.    Influenza vaccine needed  Comments:  Advised to obtain vaccine at Pharmacy.         Side effects of medication(s) were discussed in detail and patient voiced understanding.  Patient will call back for any issues or complications.     I have spent a total of 40 minutes with the patient as well as reviewing the chart/medical record and placing orders on the day of the visit. Discussed weight, chronic pain, lifestyle changes, vaccines, AUB.     RTC in 6 month(s) or sooner PRN for HTN.

## 2022-11-02 ENCOUNTER — PATIENT MESSAGE (OUTPATIENT)
Dept: OBSTETRICS AND GYNECOLOGY | Facility: CLINIC | Age: 52
End: 2022-11-02
Payer: MEDICARE

## 2022-11-03 ENCOUNTER — PROCEDURE VISIT (OUTPATIENT)
Dept: OBSTETRICS AND GYNECOLOGY | Facility: CLINIC | Age: 52
End: 2022-11-03
Attending: OBSTETRICS & GYNECOLOGY
Payer: MEDICARE

## 2022-11-03 VITALS — BODY MASS INDEX: 36.67 KG/M2 | HEIGHT: 58 IN | WEIGHT: 174.69 LBS

## 2022-11-03 DIAGNOSIS — Z11.51 ENCOUNTER FOR SCREENING FOR HUMAN PAPILLOMAVIRUS (HPV): Primary | ICD-10-CM

## 2022-11-03 DIAGNOSIS — Z12.4 ENCOUNTER FOR PAPANICOLAOU SMEAR FOR CERVICAL CANCER SCREENING: ICD-10-CM

## 2022-11-03 DIAGNOSIS — N93.8 DUB (DYSFUNCTIONAL UTERINE BLEEDING): ICD-10-CM

## 2022-11-03 PROCEDURE — 58100 ENDOMETRIAL BIOPSY: ICD-10-PCS | Mod: S$PBB,,, | Performed by: OBSTETRICS & GYNECOLOGY

## 2022-11-03 PROCEDURE — 88175 CYTOPATH C/V AUTO FLUID REDO: CPT | Performed by: OBSTETRICS & GYNECOLOGY

## 2022-11-03 PROCEDURE — 87624 HPV HI-RISK TYP POOLED RSLT: CPT | Performed by: OBSTETRICS & GYNECOLOGY

## 2022-11-03 PROCEDURE — 58100 BIOPSY OF UTERUS LINING: CPT | Mod: PBBFAC | Performed by: OBSTETRICS & GYNECOLOGY

## 2022-11-03 RX ORDER — ESTRADIOL 1 MG/1
1 TABLET ORAL DAILY
COMMUNITY
Start: 2022-10-25 | End: 2023-03-13

## 2022-11-04 NOTE — PROCEDURES
Endometrial biopsy    Date/Time: 11/3/2022 3:15 PM  Performed by: Randa Dumont MD  Authorized by: Randa Dumont MD     Consent:     Consent obtained:  Verbal    Consent given by:  Patient    Patient questions answered: yes      Patient agrees, verbalizes understanding, and wants to proceed: yes      Educational handouts given: no      Instructions and paperwork completed: no    Indication:     Indications: Other disorder of menstruation and other abnormal bleeding from female genital tract    Pre-procedure:     Pre-procedure timeout performed: yes    Procedure:     Procedure: endometrial biopsy with Pipelle      Unable to perform due to: cervical stenosis    Comments:     Procedure comments:  Stenotic os. Not able to do EMB  Schedule for hyst in January  Explained in detail

## 2022-11-09 ENCOUNTER — PATIENT MESSAGE (OUTPATIENT)
Dept: SURGERY | Facility: OTHER | Age: 52
End: 2022-11-09
Payer: MEDICARE

## 2022-11-09 NOTE — TELEPHONE ENCOUNTER
I think there is already a case request in for Davinci hysterectomy. She canceled her surgery in October. Can we reschedule for January 11 at 8 am? Do I need to put in a new case request?

## 2022-11-11 LAB
FINAL PATHOLOGIC DIAGNOSIS: NORMAL
Lab: NORMAL

## 2022-11-15 LAB
HPV HR 12 DNA SPEC QL NAA+PROBE: NEGATIVE
HPV16 AG SPEC QL: NEGATIVE
HPV18 DNA SPEC QL NAA+PROBE: NEGATIVE

## 2022-11-17 ENCOUNTER — PATIENT MESSAGE (OUTPATIENT)
Dept: OBSTETRICS AND GYNECOLOGY | Facility: CLINIC | Age: 52
End: 2022-11-17
Payer: MEDICARE

## 2022-11-29 ENCOUNTER — PATIENT MESSAGE (OUTPATIENT)
Dept: OBSTETRICS AND GYNECOLOGY | Facility: CLINIC | Age: 52
End: 2022-11-29
Payer: MEDICARE

## 2022-12-06 ENCOUNTER — PATIENT MESSAGE (OUTPATIENT)
Dept: SURGERY | Facility: OTHER | Age: 52
End: 2022-12-06
Payer: MEDICARE

## 2022-12-06 DIAGNOSIS — N93.8 DUB (DYSFUNCTIONAL UTERINE BLEEDING): Primary | ICD-10-CM

## 2022-12-29 ENCOUNTER — PATIENT MESSAGE (OUTPATIENT)
Dept: OBSTETRICS AND GYNECOLOGY | Facility: CLINIC | Age: 52
End: 2022-12-29
Payer: MEDICARE

## 2022-12-29 ENCOUNTER — OFFICE VISIT (OUTPATIENT)
Dept: OBSTETRICS AND GYNECOLOGY | Facility: CLINIC | Age: 52
End: 2022-12-29
Attending: OBSTETRICS & GYNECOLOGY
Payer: MEDICARE

## 2022-12-29 ENCOUNTER — LAB VISIT (OUTPATIENT)
Dept: LAB | Facility: OTHER | Age: 52
End: 2022-12-29
Attending: OBSTETRICS & GYNECOLOGY
Payer: MEDICARE

## 2022-12-29 DIAGNOSIS — N95.1 PERIMENOPAUSAL: ICD-10-CM

## 2022-12-29 DIAGNOSIS — N95.1 PERIMENOPAUSAL: Primary | ICD-10-CM

## 2022-12-29 LAB
ESTRADIOL SERPL-MCNC: 15 PG/ML
FSH SERPL-ACNC: 62.79 MIU/ML

## 2022-12-29 PROCEDURE — 99214 OFFICE O/P EST MOD 30 MIN: CPT | Mod: S$PBB,,, | Performed by: OBSTETRICS & GYNECOLOGY

## 2022-12-29 PROCEDURE — 99999 PR PBB SHADOW E&M-EST. PATIENT-LVL II: CPT | Mod: PBBFAC,,, | Performed by: OBSTETRICS & GYNECOLOGY

## 2022-12-29 PROCEDURE — 83001 ASSAY OF GONADOTROPIN (FSH): CPT | Performed by: OBSTETRICS & GYNECOLOGY

## 2022-12-29 PROCEDURE — 99999 PR PBB SHADOW E&M-EST. PATIENT-LVL II: ICD-10-PCS | Mod: PBBFAC,,, | Performed by: OBSTETRICS & GYNECOLOGY

## 2022-12-29 PROCEDURE — 99212 OFFICE O/P EST SF 10 MIN: CPT | Mod: PBBFAC | Performed by: OBSTETRICS & GYNECOLOGY

## 2022-12-29 PROCEDURE — 36415 COLL VENOUS BLD VENIPUNCTURE: CPT | Performed by: OBSTETRICS & GYNECOLOGY

## 2022-12-29 PROCEDURE — 99214 PR OFFICE/OUTPT VISIT, EST, LEVL IV, 30-39 MIN: ICD-10-PCS | Mod: S$PBB,,, | Performed by: OBSTETRICS & GYNECOLOGY

## 2022-12-29 PROCEDURE — 82670 ASSAY OF TOTAL ESTRADIOL: CPT | Performed by: OBSTETRICS & GYNECOLOGY

## 2022-12-29 RX ORDER — METHYLPHENIDATE HYDROCHLORIDE 10 MG/1
10 TABLET ORAL 2 TIMES DAILY
COMMUNITY
Start: 2022-12-27

## 2022-12-29 NOTE — TELEPHONE ENCOUNTER
Yes can we talk in person and postpone preop please. Would be better in person. Hard to find time to call for lengthy discussion about surgery.

## 2022-12-29 NOTE — TELEPHONE ENCOUNTER
See pt's message.  She has pre admit with anesthesia at 1:30 then has pre op with you at 3.  Do you want to call to discuss or would this be better discussed in person? I can always postpone her pre admit appt with anesthesia so she can talk to you first.

## 2022-12-29 NOTE — PROGRESS NOTES
Subjective:       Patient ID: Rosa Lau is a 52 y.o. female.    Chief Complaint:  Consult        History of Present Illness  Rosa Lau is a 52 y.o. female  who presents for consultation. Was originally here for preop for hysterectomy. Patient was having DUB on HRT after diagnosed as menopausal based on symptoms and labs. Therefore technically PMB. I attempted EMB in office and was not able to get in due to stenosis. Options were discussed and the decision was made to go with hysterectomy. About 5 weeks ago patient stopped HRT and the bleeding stopped and she has had no bleeding for 5 weeks AND her menopausal symptoms and PMDD symptoms are gone. We discussed this in detail. Explained perimenopause. DUB vs PMB and implications of that. After discussion will check FSH and estradiol and see where she is. Consider repeating u/s in a fwe months especially if menopausal. All questions asnwered. For now she would like to cancel surgery. I also offered D&C instead of hyst. She is aware without a sample of the uterus we are not 100% sure she does not have uterine cancer. All questions answered. Will make plan after labs are back.     Patient's last menstrual period was 10/31/2020 (exact date).   Date of Last Pap: 2022    Review of Systems  Review of Systems     Objective:   Physical Exam:   Constitutional: She appears well-developed and well-nourished.                                Assessment/ Plan:     1. Perimenopausal  Follicle Stimulating Hormone    Estradiol          No follow-ups on file.    As of 2021, the Cures Act has been passed nationally. This new law requires that all doctors progress notes, lab results, pathology reports and radiology reports be released IMMEDIATELY to the patient in the patient portal. That means that the results are released to you at the EXACT same time they are released to me. Therefore, with all of the patients that I have I am not able to reply to  each patient exactly when the results come in. So there will be a delay from when you see the results to when I see them and have time to come up with a response to send you. Also I only see these results when I am on the computer at work. So if the results come in over the weekend or after 5 pm of a work day, I will not see them until the next business day. As you can tell, this is a challenge as a physician to give every patient the quick response they hope for and deserve. So please be patient! Thanks for understanding, Dr. Dumont

## 2023-01-04 ENCOUNTER — PATIENT MESSAGE (OUTPATIENT)
Dept: SURGERY | Facility: OTHER | Age: 53
End: 2023-01-04
Payer: MEDICARE

## 2023-01-04 NOTE — TELEPHONE ENCOUNTER
Lynda and Edel- Please cancel her surgery  Angélica- cancel pre and post op appts  Elle-remove from google

## 2023-01-04 NOTE — TELEPHONE ENCOUNTER
You can open starting at 10:30. Have last patient at 2 pm so I can have some admin time.  Also please tell the other patient that day she will be an 8 am start now, get there for 6 am

## 2023-02-07 ENCOUNTER — HOSPITAL ENCOUNTER (EMERGENCY)
Facility: HOSPITAL | Age: 53
Discharge: HOME | End: 2023-02-07
Attending: EMERGENCY MEDICINE
Payer: MEDICARE

## 2023-02-07 ENCOUNTER — OFFICE VISIT (OUTPATIENT)
Dept: INTERNAL MEDICINE | Facility: CLINIC | Age: 53
End: 2023-02-07
Attending: FAMILY MEDICINE
Payer: MEDICARE

## 2023-02-07 VITALS — WEIGHT: 177 LBS | SYSTOLIC BLOOD PRESSURE: 120 MMHG | DIASTOLIC BLOOD PRESSURE: 70 MMHG | BODY MASS INDEX: 36.99 KG/M2

## 2023-02-07 VITALS
DIASTOLIC BLOOD PRESSURE: 93 MMHG | HEIGHT: 58 IN | RESPIRATION RATE: 20 BRPM | SYSTOLIC BLOOD PRESSURE: 131 MMHG | BODY MASS INDEX: 37.16 KG/M2 | OXYGEN SATURATION: 97 % | WEIGHT: 177 LBS | HEART RATE: 73 BPM | TEMPERATURE: 99 F

## 2023-02-07 DIAGNOSIS — R11.2 NAUSEA AND VOMITING, UNSPECIFIED VOMITING TYPE: ICD-10-CM

## 2023-02-07 DIAGNOSIS — R10.9 ABDOMINAL PAIN: ICD-10-CM

## 2023-02-07 DIAGNOSIS — I10 ESSENTIAL HYPERTENSION: ICD-10-CM

## 2023-02-07 DIAGNOSIS — R10.13 EPIGASTRIC ABDOMINAL PAIN: Primary | ICD-10-CM

## 2023-02-07 DIAGNOSIS — R10.11 RUQ PAIN: Primary | ICD-10-CM

## 2023-02-07 LAB
ALBUMIN SERPL BCP-MCNC: 3.6 G/DL (ref 3.5–5.2)
ALP SERPL-CCNC: 56 U/L (ref 55–135)
ALT SERPL W/O P-5'-P-CCNC: 40 U/L (ref 10–44)
ANION GAP SERPL CALC-SCNC: 11 MMOL/L (ref 8–16)
AST SERPL-CCNC: 35 U/L (ref 10–40)
BACTERIA #/AREA URNS AUTO: NORMAL /HPF
BASOPHILS # BLD AUTO: 0.11 K/UL (ref 0–0.2)
BASOPHILS NFR BLD: 1.3 % (ref 0–1.9)
BILIRUB SERPL-MCNC: 0.2 MG/DL (ref 0.1–1)
BILIRUB UR QL STRIP: NEGATIVE
BUN SERPL-MCNC: 9 MG/DL (ref 6–20)
CALCIUM SERPL-MCNC: 9 MG/DL (ref 8.7–10.5)
CHLORIDE SERPL-SCNC: 106 MMOL/L (ref 95–110)
CLARITY UR REFRACT.AUTO: CLEAR
CO2 SERPL-SCNC: 22 MMOL/L (ref 23–29)
COLOR UR AUTO: ABNORMAL
CREAT SERPL-MCNC: 0.8 MG/DL (ref 0.5–1.4)
DIFFERENTIAL METHOD: ABNORMAL
EOSINOPHIL # BLD AUTO: 0.3 K/UL (ref 0–0.5)
EOSINOPHIL NFR BLD: 3.8 % (ref 0–8)
ERYTHROCYTE [DISTWIDTH] IN BLOOD BY AUTOMATED COUNT: 12.2 % (ref 11.5–14.5)
EST. GFR  (NO RACE VARIABLE): >60 ML/MIN/1.73 M^2
GLUCOSE SERPL-MCNC: 109 MG/DL (ref 70–110)
GLUCOSE UR QL STRIP: NEGATIVE
HCT VFR BLD AUTO: 41.6 % (ref 37–48.5)
HGB BLD-MCNC: 13.9 G/DL (ref 12–16)
HGB UR QL STRIP: NEGATIVE
IMM GRANULOCYTES # BLD AUTO: 0.03 K/UL (ref 0–0.04)
IMM GRANULOCYTES NFR BLD AUTO: 0.4 % (ref 0–0.5)
INFLUENZA A, MOLECULAR: NEGATIVE
INFLUENZA B, MOLECULAR: NEGATIVE
KETONES UR QL STRIP: NEGATIVE
LEUKOCYTE ESTERASE UR QL STRIP: ABNORMAL
LIPASE SERPL-CCNC: 30 U/L (ref 4–60)
LYMPHOCYTES # BLD AUTO: 2.1 K/UL (ref 1–4.8)
LYMPHOCYTES NFR BLD: 25.5 % (ref 18–48)
MCH RBC QN AUTO: 29.3 PG (ref 27–31)
MCHC RBC AUTO-ENTMCNC: 33.4 G/DL (ref 32–36)
MCV RBC AUTO: 88 FL (ref 82–98)
MICROSCOPIC COMMENT: NORMAL
MONOCYTES # BLD AUTO: 0.6 K/UL (ref 0.3–1)
MONOCYTES NFR BLD: 6.9 % (ref 4–15)
NEUTROPHILS # BLD AUTO: 5.1 K/UL (ref 1.8–7.7)
NEUTROPHILS NFR BLD: 62.1 % (ref 38–73)
NITRITE UR QL STRIP: NEGATIVE
NRBC BLD-RTO: 0 /100 WBC
PH UR STRIP: 5 [PH] (ref 5–8)
PLATELET # BLD AUTO: 344 K/UL (ref 150–450)
PMV BLD AUTO: 8.9 FL (ref 9.2–12.9)
POTASSIUM SERPL-SCNC: 4.9 MMOL/L (ref 3.5–5.1)
PROT SERPL-MCNC: 7.4 G/DL (ref 6–8.4)
PROT UR QL STRIP: NEGATIVE
RBC # BLD AUTO: 4.74 M/UL (ref 4–5.4)
RBC #/AREA URNS AUTO: 1 /HPF (ref 0–4)
SODIUM SERPL-SCNC: 139 MMOL/L (ref 136–145)
SP GR UR STRIP: 1 (ref 1–1.03)
SPECIMEN SOURCE: NORMAL
SQUAMOUS #/AREA URNS AUTO: 2 /HPF
URN SPEC COLLECT METH UR: ABNORMAL
WBC # BLD AUTO: 8.16 K/UL (ref 3.9–12.7)
WBC #/AREA URNS AUTO: 4 /HPF (ref 0–5)

## 2023-02-07 PROCEDURE — 85025 COMPLETE CBC W/AUTO DIFF WBC: CPT | Performed by: EMERGENCY MEDICINE

## 2023-02-07 PROCEDURE — 25000003 PHARM REV CODE 250: Performed by: EMERGENCY MEDICINE

## 2023-02-07 PROCEDURE — 99284 EMERGENCY DEPT VISIT MOD MDM: CPT | Mod: ,,, | Performed by: EMERGENCY MEDICINE

## 2023-02-07 PROCEDURE — 93010 EKG 12-LEAD: ICD-10-PCS | Mod: ,,, | Performed by: INTERNAL MEDICINE

## 2023-02-07 PROCEDURE — 99214 PR OFFICE/OUTPT VISIT, EST, LEVL IV, 30-39 MIN: ICD-10-PCS | Mod: 95,,, | Performed by: FAMILY MEDICINE

## 2023-02-07 PROCEDURE — 93005 ELECTROCARDIOGRAM TRACING: CPT

## 2023-02-07 PROCEDURE — 99214 OFFICE O/P EST MOD 30 MIN: CPT | Mod: 95,,, | Performed by: FAMILY MEDICINE

## 2023-02-07 PROCEDURE — 81001 URINALYSIS AUTO W/SCOPE: CPT | Performed by: EMERGENCY MEDICINE

## 2023-02-07 PROCEDURE — 80053 COMPREHEN METABOLIC PANEL: CPT | Performed by: EMERGENCY MEDICINE

## 2023-02-07 PROCEDURE — 99285 EMERGENCY DEPT VISIT HI MDM: CPT | Mod: 25

## 2023-02-07 PROCEDURE — 99284 PR EMERGENCY DEPT VISIT,LEVEL IV: ICD-10-PCS | Mod: ,,, | Performed by: EMERGENCY MEDICINE

## 2023-02-07 PROCEDURE — 96374 THER/PROPH/DIAG INJ IV PUSH: CPT | Mod: 59

## 2023-02-07 PROCEDURE — 93010 ELECTROCARDIOGRAM REPORT: CPT | Mod: ,,, | Performed by: INTERNAL MEDICINE

## 2023-02-07 PROCEDURE — 83690 ASSAY OF LIPASE: CPT | Performed by: EMERGENCY MEDICINE

## 2023-02-07 PROCEDURE — 25500020 PHARM REV CODE 255: Performed by: EMERGENCY MEDICINE

## 2023-02-07 PROCEDURE — 63600175 PHARM REV CODE 636 W HCPCS: Performed by: EMERGENCY MEDICINE

## 2023-02-07 PROCEDURE — 87502 INFLUENZA DNA AMP PROBE: CPT | Performed by: EMERGENCY MEDICINE

## 2023-02-07 RX ORDER — ONDANSETRON 4 MG/1
4 TABLET, ORALLY DISINTEGRATING ORAL EVERY 6 HOURS PRN
Qty: 12 TABLET | Refills: 0 | Status: SHIPPED | OUTPATIENT
Start: 2023-02-07

## 2023-02-07 RX ORDER — MAG HYDROX/ALUMINUM HYD/SIMETH 200-200-20
30 SUSPENSION, ORAL (FINAL DOSE FORM) ORAL
Status: COMPLETED | OUTPATIENT
Start: 2023-02-07 | End: 2023-02-07

## 2023-02-07 RX ORDER — ACETAMINOPHEN 500 MG
1000 TABLET ORAL
Status: COMPLETED | OUTPATIENT
Start: 2023-02-07 | End: 2023-02-07

## 2023-02-07 RX ORDER — ONDANSETRON 2 MG/ML
4 INJECTION INTRAMUSCULAR; INTRAVENOUS
Status: COMPLETED | OUTPATIENT
Start: 2023-02-07 | End: 2023-02-07

## 2023-02-07 RX ORDER — QUETIAPINE FUMARATE 50 MG/1
TABLET, FILM COATED ORAL
COMMUNITY
Start: 2022-11-07 | End: 2023-03-13 | Stop reason: ALTCHOICE

## 2023-02-07 RX ADMIN — IOHEXOL 100 ML: 350 INJECTION, SOLUTION INTRAVENOUS at 01:02

## 2023-02-07 RX ADMIN — ALUMINUM HYDROXIDE, MAGNESIUM HYDROXIDE, AND SIMETHICONE 30 ML: 200; 200; 20 SUSPENSION ORAL at 10:02

## 2023-02-07 RX ADMIN — ACETAMINOPHEN 1000 MG: 500 TABLET ORAL at 12:02

## 2023-02-07 RX ADMIN — SODIUM CHLORIDE 500 ML: 9 INJECTION, SOLUTION INTRAVENOUS at 10:02

## 2023-02-07 RX ADMIN — ONDANSETRON 4 MG: 2 INJECTION INTRAMUSCULAR; INTRAVENOUS at 10:02

## 2023-02-07 NOTE — Clinical Note
"Rosa Britt"Judi was seen and treated in our emergency department on 2/7/2023.  She may return to work on 02/09/2023.       If you have any questions or concerns, please don't hesitate to call.      Alok Veloz MD"
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
## 2023-02-07 NOTE — ED NOTES
Patient identifiers verified and correct for Rosa Baumann  LOC: The patient is awake, alert and aware of environment with an appropriate affect, the patient is oriented x 3 and speaking appropriately.   APPEARANCE: Patient appears comfortable and in no acute distress, patient is clean and well groomed.  SKIN: The skin is warm and dry, color consistent with ethnicity, patient has normal skin turgor and moist mucus membranes, skin intact, no breakdown or bruising noted.   MUSCULOSKELETAL: Patient moving all extremities spontaneously, no swelling noted.  RESPIRATORY: Airway is open and patent, respirations are spontaneous, patient has a normal effort and rate, no accessory muscle.  CARDIAC: Pt placed on cardiac monitor. Patient has a normal rate and regular rhythm, no edema noted, capillary refill < 3 seconds.   GASTRO: Soft.  Tenderness to RUQ.   : Pt denies any pain or frequency with urination.  NEURO: Pt opens eyes spontaneously, behavior appropriate to situation, follows commands, facial expression symmetrical, bilateral hand grasp equal and even, purposeful motor response noted, normal sensation in all extremities when touched with a finger.

## 2023-02-07 NOTE — PROGRESS NOTES
The patient location is:  Home  The chief complaint leading to consultation is:  Abdominal pain    Visit type: audiovisual    Face to Face time with patient:  10 minutes  Fifteen minutes of total time spent on the encounter, which includes face to face time and non-face to face time preparing to see the patient (eg, review of tests), Obtaining and/or reviewing separately obtained history, Documenting clinical information in the electronic or other health record, Independently interpreting results (not separately reported) and communicating results to the patient/family/caregiver, or Care coordination (not separately reported).         Each patient to whom he or she provides medical services by telemedicine is:  (1) informed of the relationship between the physician and patient and the respective role of any other health care provider with respect to management of the patient; and (2) notified that he or she may decline to receive medical services by telemedicine and may withdraw from such care at any time.    Notes:   2 days RUQ pain, overall worsening  Episode yesterday of left lower quadrant pain as well.    Assessment: Abdominal pain    Plan:  We will proceed to emergency room for possible ultrasound CT scan and blood work as well as in-person examination  Answers submitted by the patient for this visit:  Abdominal Pain Questionnaire (Submitted on 2/6/2023)  Chief Complaint: Abdominal pain  Chronicity: new  Onset: yesterday  Onset quality: sudden  Frequency: constantly  Episode duration: 0.25 Hours  Progression since onset: unchanged  Pain location: RUQ  Pain - numeric: 7/10  Pain quality: cramping, sharp  arthralgias: Yes  constipation: Yes  nausea: Yes  Aggravated by: certain positions, eating, movement  Relieved by: being still, passing flatus, sitting up  Pain severity: moderate  Treatments tried: acetaminophen  Improvement on treatment: mild  abdominal surgery: Yes  colon cancer: No  Crohn's disease:  No  gallstones: No  GERD: Yes  irritable bowel syndrome: No  kidney stones: No  pancreatitis: No  PUD: No  ulcerative colitis: No  UTI: No

## 2023-02-07 NOTE — ED TRIAGE NOTES
"Pt c/o abdominal pain x 2 days.  Reports right sided "stabbing" pain.  Also having LLQ pain intermittently.  Pt states she had a virtual visit this am and told to come to ED.  (+) nausea/vomiting x 2 this am.  Denies diarrhea.  Last BM was this am. Pt states she felt constipated a few days ago and has been taking Miralax and stool softeners.  Pt also c/o intermittent hot/cold episodes x couple weeks.  "
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
## 2023-02-08 ENCOUNTER — PATIENT MESSAGE (OUTPATIENT)
Dept: INTERNAL MEDICINE | Facility: CLINIC | Age: 53
End: 2023-02-08
Payer: MEDICARE

## 2023-02-10 ENCOUNTER — HOSPITAL ENCOUNTER (OUTPATIENT)
Dept: RADIOLOGY | Facility: OTHER | Age: 53
Discharge: HOME | End: 2023-02-10
Attending: INTERNAL MEDICINE
Payer: MEDICARE

## 2023-02-10 ENCOUNTER — OFFICE VISIT (OUTPATIENT)
Dept: INTERNAL MEDICINE | Facility: CLINIC | Age: 53
End: 2023-02-10
Attending: INTERNAL MEDICINE
Payer: MEDICARE

## 2023-02-10 VITALS
WEIGHT: 178.38 LBS | DIASTOLIC BLOOD PRESSURE: 80 MMHG | HEART RATE: 76 BPM | BODY MASS INDEX: 37.44 KG/M2 | SYSTOLIC BLOOD PRESSURE: 120 MMHG | HEIGHT: 58 IN | OXYGEN SATURATION: 97 %

## 2023-02-10 DIAGNOSIS — K59.00 CONSTIPATION, UNSPECIFIED CONSTIPATION TYPE: Primary | ICD-10-CM

## 2023-02-10 DIAGNOSIS — K76.0 FATTY LIVER: ICD-10-CM

## 2023-02-10 DIAGNOSIS — Z91.81 STATUS POST FALL: ICD-10-CM

## 2023-02-10 PROCEDURE — 99999 PR PBB SHADOW E&M-EST. PATIENT-LVL IV: ICD-10-PCS | Mod: PBBFAC,,, | Performed by: INTERNAL MEDICINE

## 2023-02-10 PROCEDURE — 71100 X-RAY EXAM RIBS UNI 2 VIEWS: CPT | Mod: 26,RT,, | Performed by: RADIOLOGY

## 2023-02-10 PROCEDURE — 99214 PR OFFICE/OUTPT VISIT, EST, LEVL IV, 30-39 MIN: ICD-10-PCS | Mod: S$PBB,,, | Performed by: INTERNAL MEDICINE

## 2023-02-10 PROCEDURE — 99999 PR PBB SHADOW E&M-EST. PATIENT-LVL IV: CPT | Mod: PBBFAC,,, | Performed by: INTERNAL MEDICINE

## 2023-02-10 PROCEDURE — 71100 X-RAY EXAM RIBS UNI 2 VIEWS: CPT | Mod: TC,FY,RT

## 2023-02-10 PROCEDURE — 99214 OFFICE O/P EST MOD 30 MIN: CPT | Mod: PBBFAC | Performed by: INTERNAL MEDICINE

## 2023-02-10 PROCEDURE — 99214 OFFICE O/P EST MOD 30 MIN: CPT | Mod: S$PBB,,, | Performed by: INTERNAL MEDICINE

## 2023-02-10 PROCEDURE — 71100 XR RIBS 2 VIEW RIGHT: ICD-10-PCS | Mod: 26,RT,, | Performed by: RADIOLOGY

## 2023-02-10 RX ORDER — LACTULOSE 10 G/15ML
20 SOLUTION ORAL 2 TIMES DAILY PRN
Qty: 420 ML | Refills: 0 | Status: SHIPPED | OUTPATIENT
Start: 2023-02-10 | End: 2023-10-11
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
## 2023-02-16 ENCOUNTER — OFFICE VISIT (OUTPATIENT)
Dept: HEPATOLOGY | Facility: CLINIC | Age: 53
End: 2023-02-16
Payer: MEDICARE

## 2023-02-16 VITALS — HEIGHT: 58 IN | WEIGHT: 177.5 LBS | BODY MASS INDEX: 37.26 KG/M2

## 2023-02-16 DIAGNOSIS — R16.0 HEPATOMEGALY: ICD-10-CM

## 2023-02-16 DIAGNOSIS — G47.33 OSA (OBSTRUCTIVE SLEEP APNEA): ICD-10-CM

## 2023-02-16 DIAGNOSIS — K76.0 FATTY LIVER: Primary | ICD-10-CM

## 2023-02-16 DIAGNOSIS — E66.9 OBESITY (BMI 35.0-39.9 WITHOUT COMORBIDITY): ICD-10-CM

## 2023-02-16 DIAGNOSIS — E78.2 MIXED HYPERLIPIDEMIA: ICD-10-CM

## 2023-02-16 DIAGNOSIS — I10 ESSENTIAL HYPERTENSION: ICD-10-CM

## 2023-02-16 DIAGNOSIS — K21.9 GASTROESOPHAGEAL REFLUX DISEASE, UNSPECIFIED WHETHER ESOPHAGITIS PRESENT: ICD-10-CM

## 2023-02-16 PROCEDURE — 99214 OFFICE O/P EST MOD 30 MIN: CPT | Mod: S$PBB,,, | Performed by: PHYSICIAN ASSISTANT

## 2023-02-16 PROCEDURE — 99214 OFFICE O/P EST MOD 30 MIN: CPT | Mod: PBBFAC | Performed by: PHYSICIAN ASSISTANT

## 2023-02-16 PROCEDURE — 99999 PR PBB SHADOW E&M-EST. PATIENT-LVL IV: CPT | Mod: PBBFAC,,, | Performed by: PHYSICIAN ASSISTANT

## 2023-02-16 PROCEDURE — 99999 PR PBB SHADOW E&M-EST. PATIENT-LVL IV: ICD-10-PCS | Mod: PBBFAC,,, | Performed by: PHYSICIAN ASSISTANT

## 2023-02-16 PROCEDURE — 99214 PR OFFICE/OUTPT VISIT, EST, LEVL IV, 30-39 MIN: ICD-10-PCS | Mod: S$PBB,,, | Performed by: PHYSICIAN ASSISTANT

## 2023-02-16 RX ORDER — LACTULOSE 10 G/15ML
SOLUTION ORAL; RECTAL
COMMUNITY
Start: 2023-02-10 | End: 2023-02-16 | Stop reason: SDUPTHER

## 2023-02-16 NOTE — PROGRESS NOTES
Hepatology Consultation: [facility]    NEW PATIENT  PCP: Shavon Price MD    Subjective      Ms. Lau is a 52 y.o. female with PMH as listed below who presents to clinic for fatty liver.    In relation to metabolic risk factors:   Lab Results   Component Value Date    HGBA1C 5.0 07/23/2020    CHOL 150 07/16/2021    TSH 2.001 01/25/2022     Body mass index is 37.09 kg/m².    Lab Results   Component Value Date    ALT 40 02/07/2023    AST 35 02/07/2023    ALKPHOS 56 02/07/2023    BILITOT 0.2 02/07/2023    ALBUMIN 3.6 02/07/2023     02/07/2023       Initial history 02/16/2023  Rosa Lau arrives today alone.  She is well-appearing and feels well. Denies symptoms of hepatic decompensation including jaundice, ascites, cognitive problems to suggest hepatic encephalopathy, or GI bleeding.    She reports she had fallen down her front steps and she hit her right side of her chest/abdomen. Subsequently she had some pain on that side and then she presented to the ER. She had a scan that suggested fatty liver. She tells me she had previously been told she had a fatty liver but didn't know much about it.     In regards to lifestyle, she is a vegetarian. During COVID she gained around 30 lbs, but has successfully lost some weight recently about 20 lbs. She is part of a walking club and is becoming more active. She is attempting to watch her diet. She reports that she has long struggles with reflux, occasional abd pain, constipation (recently placed on lactulose). She reports that she did have an episode of tarry stools within the past two weeks. She denies weakness, fever, chills. Today she is feeling fine.     They deny a family history of liver cancer, cirrhosis, or hepatitis.  Mother with fatty liver.  Reflux, tarry stools, abdominal pain, constipation.    Vegetarian diet. She has lost 20 lbs in the last 8 months.    ?rx: seroquel, estrogen, vraylar, ritalin    ETOH: 2  "beers per week   Smoking: prior   Illicit substances: denies  Prior liver biopsy: denies   Prior liver disease: fatty liver   Hepatitis vaccination: unknown       PMH Rosa has a past medical history of Bipolar disorder, Genetic testing (02/16/2022), Hyperlipidemia, Hypertension, and Macular degeneration of both eyes.   PSXH Rosa has a past surgical history that includes Ovarian cyst surgery; ankle plate surgery ; Fracture surgery (08/01/2015); and Exploratory laparotomy.   FH Rosa's family history includes Breast cancer (age of onset: 30) in an other family member; Breast cancer (age of onset: 40) in her maternal aunt; COPD in her mother; Colon cancer (age of onset: 35) in an other family member; Colon polyps in her mother; Diabetes in her paternal grandmother; Hypertension in her father and mother; Liver cancer in her maternal uncle; Lung cancer in her father and maternal uncle; No Known Problems in her brother; Pancreatic cancer (age of onset: 60) in an other family member; Stroke (age of onset: 33) in her maternal grandmother; Suicide in her maternal grandfather.   MICHELLE Lee reports that she quit smoking about 23 years ago. Her smoking use included cigarettes. She has a 2.50 pack-year smoking history. She has never used smokeless tobacco. She reports current alcohol use of about 2.0 standard drinks per week. She reports that she does not use drugs.   IRISH Lee is allergic to cat's claw and mold.   MARK Lee has a current medication list which includes the following prescription(s): vraylar, clonazepam, estradiol, fenofibrate, fluticasone propionate, fluvoxamine, hydrochlorothiazide, lactulose, lactulose, metronidazole, ondansetron, pantoprazole, propranolol, quetiapine, ritalin, and ventolin hfa.     ROS:   GENERAL: Denies fatigue  HEENT: Denies yellowing of eyes   CARDIOVASCULAR: Denies edema  GI: Denies abdominal pain  SKIN: Denies rash, itching     Objective     Ht 4' 10" (1.473 m)   Wt 80.5 kg (177 " lb 7.5 oz)   LMP 10/31/2020 (Exact Date)   BMI 37.09 kg/m²     PHYSICAL EXAM:   Friendly woman, in no acute distress; alert and oriented to person, place and time  EYES: Sclerae anicteric  GI: Soft, non-tender, non-distended. No ascites. Protuberant obese abdomen with palpable hepatomegaly.  EXTREMITIES:  No edema.  SKIN: Warm and dry. No jaundice. No telangectasias noted. No palmar erythema.  NEURO:  Normal gait. No asterixis.  PSYCH:  Memory intact. Thought and speech pattern appropriate. Behavior normal      DIAGNOSTICS  Lab Results   Component Value Date    ALT 40 02/07/2023    AST 35 02/07/2023    ALKPHOS 56 02/07/2023    BILITOT 0.2 02/07/2023    ALBUMIN 3.6 02/07/2023     02/07/2023     No results found for: AFP    Prior serologic workup:   Lab Results   Component Value Date    HEPCAB Non-reactive 10/06/2022       Imaging:  US ABDOMEN LIMITED     CLINICAL HISTORY:  ruq, r/o vishal;     TECHNIQUE:  Limited ultrasound of the right upper quadrant of the abdomen (including pancreas, liver, gallbladder, common bile duct, and spleen) was performed.     COMPARISON:  CT abdomen pelvis 07/27/2021.     FINDINGS:  Pancreas: Visualized portions of the pancreas appear unremarkable.     Liver: Enlarged, measuring 19.5 cm. Echogenic liver parenchyma.  Hepatorenal index measures 1.7.  No focal hepatic lesions.     Gallbladder: No calculi, wall thickening, or pericholecystic fluid.  No sonographic Hauser's sign.     Biliary system: The common duct is not dilated, measuring 4 mm.  No intrahepatic ductal dilatation.     Spleen: Normal in size and echotexture, measuring 10.6 x 4.6 cm.     Miscellaneous: No upper abdominal ascites.     Impression:     Unremarkable gallbladder.     Echogenic liver parenchyma, which could reflect steatosis and/or chronic liver disease.     Electronically signed by resident: Nayan Rios  Date:                                            02/07/2023  Time:                                            12:00     Electronically signed by: Minor Washington  Date:                                            02/07/2023  Time:                                           12:06    Assessment/Plan     52 y.o. female with:    1. Fatty liver  - Ambulatory referral/consult to Hepatology  - FibroScan [place] (Vibration Controlled Transient Elastography); Future  - Hepatitis B Surface Antigen; Future  - Hepatitis A antibody, IgG; Future  - Hepatitis B Surface Ab, Qualitative; Future    2. Essential hypertension    3. Mixed hyperlipidemia    4. HEATHER (obstructive sleep apnea)    5. Obesity (BMI 35.0-39.9 without comorbidity)    6. Gastroesophageal reflux disease, unspecified whether esophagitis present  - Ambulatory referral/consult to Gastroenterology; Future    7. Hepatomegaly      Orders Placed This Encounter   Procedures    FibroScan [place] (Vibration Controlled Transient Elastography)    Hepatitis B Surface Antigen    Hepatitis A antibody, IgG    Hepatitis B Surface Ab, Qualitative    Ambulatory referral/consult to Gastroenterology     Fibroscan and blood work now.  Follow up in about 4 weeks (around 3/16/2023).    I spent 45 minutes on the day of this encounter preparing for, evaluating, treating, and managing this patient.     Thank you for allowing me to participate in the care of Rosa Newby PA-C  Hepatology & Liver Transplant

## 2023-02-16 NOTE — PATIENT INSTRUCTIONS
Recommend Fibroscan and blood work to further evaluate the fatty liver.   Recommend follow-up in 3-4 weeks for results and review.

## 2023-02-28 ENCOUNTER — PATIENT MESSAGE (OUTPATIENT)
Dept: HEPATOLOGY | Facility: CLINIC | Age: 53
End: 2023-02-28
Payer: MEDICARE

## 2023-03-02 ENCOUNTER — LAB VISIT (OUTPATIENT)
Dept: LAB | Facility: OTHER | Age: 53
End: 2023-03-02
Attending: PHYSICIAN ASSISTANT
Payer: MEDICARE

## 2023-03-02 DIAGNOSIS — K76.0 FATTY LIVER: ICD-10-CM

## 2023-03-02 PROCEDURE — 36415 COLL VENOUS BLD VENIPUNCTURE: CPT | Performed by: PHYSICIAN ASSISTANT

## 2023-03-02 PROCEDURE — 86790 VIRUS ANTIBODY NOS: CPT | Performed by: PHYSICIAN ASSISTANT

## 2023-03-02 PROCEDURE — 86706 HEP B SURFACE ANTIBODY: CPT | Mod: 91 | Performed by: PHYSICIAN ASSISTANT

## 2023-03-02 PROCEDURE — 87340 HEPATITIS B SURFACE AG IA: CPT | Performed by: PHYSICIAN ASSISTANT

## 2023-03-03 LAB
HAV IGG SER QL IA: NORMAL
HBV SURFACE AB SER-ACNC: <3 MIU/ML
HBV SURFACE AB SER-ACNC: NORMAL M[IU]/ML
HBV SURFACE AG SERPL QL IA: NORMAL

## 2023-03-13 ENCOUNTER — PROCEDURE VISIT (OUTPATIENT)
Dept: HEPATOLOGY | Facility: CLINIC | Age: 53
End: 2023-03-13
Payer: MEDICARE

## 2023-03-13 ENCOUNTER — OFFICE VISIT (OUTPATIENT)
Dept: HEPATOLOGY | Facility: CLINIC | Age: 53
End: 2023-03-13
Payer: MEDICARE

## 2023-03-13 VITALS — WEIGHT: 178.38 LBS | BODY MASS INDEX: 37.44 KG/M2 | HEIGHT: 58 IN

## 2023-03-13 DIAGNOSIS — K76.0 FATTY LIVER: ICD-10-CM

## 2023-03-13 DIAGNOSIS — Z23 NEED FOR HEPATITIS A AND B VACCINATION: ICD-10-CM

## 2023-03-13 DIAGNOSIS — I10 ESSENTIAL HYPERTENSION: ICD-10-CM

## 2023-03-13 DIAGNOSIS — R16.0 HEPATOMEGALY: Primary | ICD-10-CM

## 2023-03-13 DIAGNOSIS — E66.9 OBESITY (BMI 35.0-39.9 WITHOUT COMORBIDITY): ICD-10-CM

## 2023-03-13 DIAGNOSIS — E78.2 MIXED HYPERLIPIDEMIA: ICD-10-CM

## 2023-03-13 DIAGNOSIS — G47.33 OSA (OBSTRUCTIVE SLEEP APNEA): ICD-10-CM

## 2023-03-13 DIAGNOSIS — K21.9 GASTROESOPHAGEAL REFLUX DISEASE, UNSPECIFIED WHETHER ESOPHAGITIS PRESENT: ICD-10-CM

## 2023-03-13 PROCEDURE — 99214 PR OFFICE/OUTPT VISIT, EST, LEVL IV, 30-39 MIN: ICD-10-PCS | Mod: S$PBB,,, | Performed by: PHYSICIAN ASSISTANT

## 2023-03-13 PROCEDURE — 76981 USE PARENCHYMA: CPT | Mod: 26,S$PBB,, | Performed by: PHYSICIAN ASSISTANT

## 2023-03-13 PROCEDURE — 99999 PR PBB SHADOW E&M-EST. PATIENT-LVL IV: CPT | Mod: PBBFAC,,, | Performed by: PHYSICIAN ASSISTANT

## 2023-03-13 PROCEDURE — 99214 OFFICE O/P EST MOD 30 MIN: CPT | Mod: PBBFAC | Performed by: PHYSICIAN ASSISTANT

## 2023-03-13 PROCEDURE — 76981 FIBROSCAN NEW ORLEANS (VIBRATION CONTROLLED TRANSIENT ELASTOGRAPHY): ICD-10-PCS | Mod: 26,S$PBB,, | Performed by: PHYSICIAN ASSISTANT

## 2023-03-13 PROCEDURE — 99999 PR PBB SHADOW E&M-EST. PATIENT-LVL IV: ICD-10-PCS | Mod: PBBFAC,,, | Performed by: PHYSICIAN ASSISTANT

## 2023-03-13 PROCEDURE — 99214 OFFICE O/P EST MOD 30 MIN: CPT | Mod: S$PBB,,, | Performed by: PHYSICIAN ASSISTANT

## 2023-03-13 PROCEDURE — 91200 LIVER ELASTOGRAPHY: CPT | Mod: PBBFAC | Performed by: PHYSICIAN ASSISTANT

## 2023-03-13 NOTE — PROGRESS NOTES
Hepatology Consultation: [facility]    EST PATIENT  PCP: Shavon Price MD    Subjective      Ms. Lau is a 52 y.o. female with PMH as listed below who presents to clinic for fatty liver    In relation to metabolic risk factors:   Lab Results   Component Value Date    HGBA1C 5.0 07/23/2020    CHOL 150 07/16/2021    TSH 2.001 01/25/2022     Body mass index is 37.28 kg/m².    Lab Results   Component Value Date    ALT 40 02/07/2023    AST 35 02/07/2023    ALKPHOS 56 02/07/2023    BILITOT 0.2 02/07/2023    ALBUMIN 3.6 02/07/2023     02/07/2023     Initial history 02/16/2023:  Rosa Lau arrives today alone.  She is well-appearing and feels well. Denies symptoms of hepatic decompensation including jaundice, ascites, cognitive problems to suggest hepatic encephalopathy, or GI bleeding.    She reports she had fallen down her front steps and she hit her right side of her chest/abdomen. Subsequently she had some pain on that side and then she presented to the ER. She had a scan that suggested fatty liver. She tells me she had previously been told she had a fatty liver but didn't know much about it.     In regards to lifestyle, she is a vegetarian. During COVID she gained around 30 lbs, but has successfully lost some weight recently about 20 lbs. She is part of a walking club and is becoming more active. She is attempting to watch her diet. She reports that she has long struggles with reflux, occasional abd pain, constipation (recently placed on lactulose). She reports that she did have an episode of tarry stools within the past two weeks. She denies weakness, fever, chills. Today she is feeling fine.     They deny a family history of liver cancer, cirrhosis, or hepatitis.  Mother with fatty liver.  Reflux, tarry stools, abdominal pain, constipation.    Vegetarian diet. She has lost 20 lbs in the last 8 months.    ?rx: seroquel, estrogen, vraylar, ritalin    Interval  history 03/13/2023:  Rosa Lau arrives today alone. Spoke with partner over the phone during conversation  Since our last visit, doing well. Signed up for [employer].   Denies symptoms of hepatic decompensation including jaundice, ascites, cognitive problems to suggest hepatic encephalopathy, or GI bleeding.   Continues to be motivated to lose weight. Vegeterian diet      ETOH: 2 beers per week   Smoking: prior   Illicit substances: denies  Prior liver biopsy: denies   Prior liver disease: fatty liver   Hepatitis vaccination: unknown       PM Rosa has a past medical history of Bipolar disorder, Genetic testing (02/16/2022), Hyperlipidemia, Hypertension, and Macular degeneration of both eyes.   PSXH Rosa has a past surgical history that includes Ovarian cyst surgery; ankle plate surgery ; Fracture surgery (08/01/2015); and Exploratory laparotomy.    Rosa's family history includes Breast cancer (age of onset: 30) in an other family member; Breast cancer (age of onset: 40) in her maternal aunt; COPD in her mother; Colon cancer (age of onset: 35) in an other family member; Colon polyps in her mother; Diabetes in her paternal grandmother; Hypertension in her father and mother; Liver cancer in her maternal uncle; Lung cancer in her father and maternal uncle; No Known Problems in her brother; Pancreatic cancer (age of onset: 60) in an other family member; Stroke (age of onset: 33) in her maternal grandmother; Suicide in her maternal grandfather.   MICHELLE Lee reports that she quit smoking about 23 years ago. Her smoking use included cigarettes. She has a 2.50 pack-year smoking history. She has never used smokeless tobacco. She reports current alcohol use of about 2.0 standard drinks per week. She reports that she does not use drugs.   IRISH Lee is allergic to cat's claw and mold.   MED Rosa has a current medication list which includes the following prescription(s): vraylar, clonazepam,  "fenofibrate, fluticasone propionate, fluvoxamine, hydrochlorothiazide, lactulose, metronidazole, ondansetron, pantoprazole, propranolol, ritalin, ventolin hfa, estradiol, hepatitis a and b vaccine (pf), and quetiapine.     ROS:   GENERAL: Denies fatigue  HEENT: Denies yellowing of eyes   CARDIOVASCULAR: Denies edema  GI: Denies abdominal pain  SKIN: Denies rash, itching     Objective     Ht 4' 10" (1.473 m)   Wt 80.9 kg (178 lb 5.6 oz)   LMP 10/31/2020 (Exact Date)   BMI 37.28 kg/m²     PHYSICAL EXAM:   Friendly woman, in no acute distress; alert and oriented to person, place and time  EYES: Sclerae anicteric  SKIN:  No jaundice. No telangectasias noted. No palmar erythema.  NEURO:  Normal gait.   PSYCH:  Memory intact. Thought and speech pattern appropriate. Behavior normal      DIAGNOSTICS  Lab Results   Component Value Date    ALT 40 02/07/2023    AST 35 02/07/2023    ALKPHOS 56 02/07/2023    BILITOT 0.2 02/07/2023    ALBUMIN 3.6 02/07/2023     02/07/2023     No results found for: AFP    Prior serologic workup:   Lab Results   Component Value Date    HEPBSAG Non-reactive 03/02/2023    HEPCAB Non-reactive 10/06/2022       Imaging:  US ABDOMEN LIMITED     CLINICAL HISTORY:  ruq, r/o vishal;     TECHNIQUE:  Limited ultrasound of the right upper quadrant of the abdomen (including pancreas, liver, gallbladder, common bile duct, and spleen) was performed.     COMPARISON:  CT abdomen pelvis 07/27/2021.     FINDINGS:  Pancreas: Visualized portions of the pancreas appear unremarkable.     Liver: Enlarged, measuring 19.5 cm. Echogenic liver parenchyma.  Hepatorenal index measures 1.7.  No focal hepatic lesions.     Gallbladder: No calculi, wall thickening, or pericholecystic fluid.  No sonographic Hauser's sign.     Biliary system: The common duct is not dilated, measuring 4 mm.  No intrahepatic ductal dilatation.     Spleen: Normal in size and echotexture, measuring 10.6 x 4.6 cm.     Miscellaneous: No upper " abdominal ascites.     Impression:     Unremarkable gallbladder.     Echogenic liver parenchyma, which could reflect steatosis and/or chronic liver disease.     Electronically signed by resident: Nayan Rios  Date:                                            02/07/2023  Time:                                           12:00     Electronically signed by: Minor Washington  Date:                                            02/07/2023  Time:                                           12:06      Findings Fibroscan 03/13/2023  Median liver stiffness score:  5.6  CAP Reading: dB/m:  400     IQR/med %:  5  Interpretation  Fibrosis interpretation is based on medial liver stiffness - Kilopascal (kPa).     Fibrosis Stage:  F 0-1  Steatosis interpretation is based on controlled attenuation parameter - (dB/m).     Steatosis Grade:  S3    Assessment/Plan     52 y.o. female with:    1. Fatty liver  - FibroScan [place] (Vibration Controlled Transient Elastography); Future  - US Abdomen Limited; Future    2. Mixed hyperlipidemia  - FibroScan [place] (Vibration Controlled Transient Elastography); Future  - US Abdomen Limited; Future    3. Essential hypertension    4. HEATHER (obstructive sleep apnea)    5. Obesity (BMI 35.0-39.9 without comorbidity)    6. Gastroesophageal reflux disease, unspecified whether esophagitis present    7. Hepatomegaly  - FibroScan [place] (Vibration Controlled Transient Elastography); Future  - US Abdomen Limited; Future    8. Need for hepatitis A and B vaccination  - Ambulatory referral/consult to Infectious Disease Injection Dept; Future  - hepatitis A and B vaccine, PF, (TWINRIX) 720 BRIDGETTE unit- 20 mcg/mL Syrg suspension; Inject 1 mL (720 Units total) into the muscle once. (3-series vaccine):  Initial vaccine; then again 1 month from first injection, then again 6 months from first injection. for 1 dose  Dispense: 1 mL; Refill: 0  - Hepatitis A / Hepatitis B Combined Vaccine (IM); Standing        Orders  Placed This Encounter   Procedures    FibroScan [place] (Vibration Controlled Transient Elastography)    US Abdomen Limited    Hepatitis A / Hepatitis B Combined Vaccine (IM)    Ambulatory referral/consult to Infectious Disease Injection Dept     Reviewed Fibroscan indicating severe steatosis.   Long and thoughtful conversation regarding diet (limiting carbs for hypocaloric diet Mediterranean veg for next 12 weeks), then maintain weight loss. Exercise - she has already joined a gym.   Discussed NAFLD can be progressive resulting in fibrosis however reassuringly she does not have any.  Recommend Twinrix vaccination.  Follow up in about 1 year (around 3/13/2024).    I spent 35 minutes on the day of this encounter preparing for, evaluating, treating, and managing this patient.     Thank you for allowing me to participate in the care of MOE VásquezC  Hepatology & Liver Transplant

## 2023-03-13 NOTE — PROCEDURES
FibroScan [place] (Vibration Controlled Transient Elastography)    Date/Time: 3/13/2023 10:15 AM  Performed by: Alyssa Newby PA-C  Authorized by: Alyssa Newby PA-C     Diagnosis:  NAFLD    Probe:  XL    Universal Protocol: Patient's identity, procedure and site were verified, confirmatory pause was performed.  Discussed procedure including risks and potential complications.  Questions answered.  Patient verbalizes understanding and wishes to proceed with VCTE.     Procedure: After providing explanations of the procedure, patient was placed in the supine position with right arm in maximum abduction to allow optimal exposure of right lateral abdomen.  Patient was briefly assessed, Testing was performed in the mid-axillary location, 50Hz Shear Wave pulses were applied and the resulting Shear Wave and Propagation Speed detected with a 3.5 MHz ultrasonic signal, using the FibroScan probe, Skin to liver capsule distance and liver parenchyma were accessed during the entire examination with the FibroScan probe, Patient was instructed to breathe normally and to abstain from sudden movements during the procedure, allowing for random measurements of liver stiffness. At least 10 Shear Waves were produced, Individual measurements of each Shear Wave were calculated.  Patient tolerated the procedure well with no complications.  Meets discharge criteria as was dismissed.  Rates pain 0 out of 10.  Patient will follow up with ordering provider to review results.    Findings  Median liver stiffness score:  5.6  CAP Reading: dB/m:  400    IQR/med %:  5  Interpretation  Fibrosis interpretation is based on medial liver stiffness - Kilopascal (kPa).    Fibrosis Stage:  F 0-1  Steatosis interpretation is based on controlled attenuation parameter - (dB/m).    Steatosis Grade:  S3

## 2023-03-13 NOTE — PATIENT INSTRUCTIONS
Fibroscan suggests severe fatty liver but reassuringly no fibrosis  Recommend continued weight loss 17 lbs  Follow-up in 1 year with Fibroscan and labs prior.  Twinrix vaccination recommended    FATTY LIVER RECOMMENDATIONS:    There is no FDA approved therapy for non-alcoholic fatty liver disease (NAFLD); therefore, lifestyle changes are important:  1. Weight loss goal of 17 lbs  2. Mediterranean diet is recommended that focuses on low-carb, low-sugar, and low-processed foods.  3. Exercise, 5 days per week, 30 minutes per day, as tolerated  4. Recommend good control of cholesterol, blood pressure, blood sugar levels (as these are risk factors for fatty liver). Cholesterol lowering medications including statins are typically safe and beneficial (even if liver enzymes are elevated).    5. Limit alcohol consumption, no more than 1 serving(s) of alcohol in any day (1 serving is 5 ounces of wine, 12 ounces of beer, or 1.5 ounces of liquor). Do not recommend daily alcohol use.        In some people, fatty liver can progress to steatohepatitis (inflammatory fatty liver) and possibly to cirrhosis, putting one at increased risk for liver cancer and liver failure in the future. Lifestyle changes can help to decrease this risk.     If interested in seeing a dietician to create a weight loss plan, contact the dietician team at [facility]: [email or web address].  You can also call to schedule a consult with a dietician: [phone]. *Virtual visits are available with one of our dieticians.     If you have diabetes or high blood pressure, you can meet with a Health  through [facility] program. Let us know if you are interested in a referral.     Ask about our fatty liver/BATRES clinical trials if you have fibrosis / scar tissue related to fatty liver.    *If statins are being considered for HLD, statins are safe in patients with liver disease. Statins can be used to treat dyslipidemia in patients  with both NAFLD and BATRES.      FATTY LIVER DIET TIPS:    ADD OLIVE OIL. I recommend olive oil daily (in salads or when cooking)  ADD COFFEE. Black coffee has also been found to be potentially beneficial (skip the sweets and creamer). Add 1-2 cups per day,  if you are able to tolerate. Decaf is fine.  BE MINDFUL OF CARBS AND ADDED SUGARS. Try to limit your carb intake to LESS than 30-45 grams of carbs with a meal or LESS than 5-10 grams with any snack (total of any snack foods eaten during that time).   KEEP A FOOD DIARY. Use MyFitness Pal  OR LOSE IT carol ann to add up your carbs through the day - the most successful folks on weight loss journey TRACK their progress and food.  LIMIT WHAT YOU ARE DRINKING. Do NOT drink any beverages with calories or carbs (this can lead to high blood sugar and weight gain). Also, some of our patients have been very successful with weight loss using the pre made/planned meal planning services that limit calories and portion size (one example is Sensible Meals but there are many out there). Unsweetened black or green tea is fine! Hibiscus tea is also great and refreshing, especially iced.     Tips for low/moderate carbohydrate diet:  3 meals a day made up of the following:  -- Green vegetables, tomatoes, mushrooms, spaghetti squash, cauliflower, meat, poultry, seafood, eggs   -- Beans (1-1.5 cups) or nuts (1/4 cup): can have 1 x a day.  -- Greek yogurt and fermented foods like kefir, kimchi, saurkraut (be careful if you have swelling issues as lots of salt can worsen swelling or blood pressure)  -- Dressings, seasonings, condiments, etc should have less than 2 g sugars.   -- 1-2 servings of citrus fruits, berries, pineapple or melon a day (1/2 cup)  -- Avoid fried foods  -- Avoid grains, rice, pasta, potatoes, bread, corn, peas, oatmeal, grits, tortillas, crackers, chips  -- No soda, sweet tea, juices or lemonade  -- Use olive/avocado oil rather than vegetable oils or  butter.        ADDITIONAL RESOURCES:  Websites with information about fatty liver and inflammation related to fatty liver (BATRES): www.nashtruth.com and www.nashactually.com   [facility]: Non-Alcoholic Fatty Liver Disease (NAFLD)    Facebook support group with tips and recipes:   Non-Alcoholic Fatty Liver Disease (NAFLD) Diet & Nutrition Support     Try www.dietThe Gluten Free Gourmet.Sumoing for recipes.          Your immunity markers show that you do NOT have immunity against Hepatitis A or B, so I recommend that you receive the combination Hepatitis A and B vaccine called TwinRix. This will protect your liver from these viruses, which can make your liver very sick.     Hep A can be transmitted through food and water and can cause significant liver injury. There is a Hepatitis A outbreak in [place] currently. The Hepatitis B infection is transmitted through blood or bodily fluids (there are no symptoms typically) and can develop longstanding (chronic) Hep B and there is no cure, so many people have it for life. Therefore, the vaccines provide immunity against these viruses that can cause harm to the liver.     The vaccine series is 3 vaccines: one now, one at 4 weeks and one 6 months after the 1st one. I sent an order that you can get the vaccine in the infectious disease department at [facility]. If that is the case, please call [phone] to schedule your vaccine administration appointment in the infectious disease department.  If you need to proceed with vaccines with the infectious disease department, you can call to obtain a cost for these vaccines before you proceed, you can call the [facility] office at [phone] or [phone]

## 2023-04-07 ENCOUNTER — PATIENT MESSAGE (OUTPATIENT)
Dept: OBSTETRICS AND GYNECOLOGY | Facility: CLINIC | Age: 53
End: 2023-04-07
Payer: MEDICARE

## 2023-04-10 ENCOUNTER — OFFICE VISIT (OUTPATIENT)
Dept: OBSTETRICS AND GYNECOLOGY | Facility: CLINIC | Age: 53
End: 2023-04-10
Payer: MEDICARE

## 2023-04-10 DIAGNOSIS — N95.1 PERIMENOPAUSAL: Primary | ICD-10-CM

## 2023-04-10 DIAGNOSIS — F06.31 MOOD DISORDER DUE TO KNOWN PHYSIOLOGICAL CONDITION WITH DEPRESSIVE FEATURES: ICD-10-CM

## 2023-04-10 PROCEDURE — 99213 PR OFFICE/OUTPT VISIT, EST, LEVL III, 20-29 MIN: ICD-10-PCS | Mod: 95,,,

## 2023-04-10 PROCEDURE — 99213 OFFICE O/P EST LOW 20 MIN: CPT | Mod: 95,,,

## 2023-04-10 NOTE — PROGRESS NOTES
The chief complaint leading to consultation is: Mood check, hormone consult    Location during visit:  [place]    Visit type: audiovisual    Face to Face time with patient: 20 min  30 minutes of total time spent on the encounter, which includes face to face time and non-face to face time preparing to see the patient (eg, review of tests), Obtaining and/or reviewing separately obtained history, Documenting clinical information in the electronic or other health record, Independently interpreting results (not separately reported) and communicating results to the patient/family/caregiver, or Care coordination (not separately reported).     Each patient to whom he or she provides medical services by telemedicine is:  (1) informed of the relationship between the physician and patient and the respective role of any other health care provider with respect to management of the patient; and (2) notified that he or she may decline to receive medical services by telemedicine and may withdraw from such care at any time.    Notes:     Chief Complaint: Mood check, hormone conslult     HPI:      Rosa Lau is a 53 y.o.  who presents today for mood check and hormone consult.  Was previously on HRT and stopped in November due to prolonged heavy periods.   She has had no bleeding since stopping HRT.  Has a history of PMDD.  Reports for the last week and a half she was feeling like she has in the past with PMDD.  Endorses emotional lability, depression, brain fog, and feelings of SI.  Reports partner is a good support and she was helping her through this difficult time.  Now for the last few days she has been having period like symptoms without bleeding - bloating, cramping, lower back aches.  Her PMDD symptoms have gotten since the onset of the physical period like symptoms just as they would when she had cycles.  She endorses some depression but denies SI/HI.  She has reached out to her psychologist - Dr. Walters  Corinna - and was told to reach out to GYN to discuss hormones.  Hx of bipolar.  Psych meds include Klonopin and Vraylar.  Pt would like to discuss getting back on either OCP's or HRT.  She endorses trouble sleeping and night sweats.    Physical Exam:     APPEARANCE: Well nourished, well developed, in no acute distress.  RESP: No respiratory distress appreciated.       Assessment/Plan:     Perimenopausal  -     Based on patients complaints it sounds like she is experiencing estrogen predominant symptoms. Will try out Prometrium nightly and see how she does.  -     progesterone (PROMETRIUM) 100 MG capsule; Take 1 capsule (100 mg total) by mouth nightly.  Dispense: 30 capsule; Refill: 3    Mood disorder due to known physiological condition with depressive features   -     Discussed the need to follow back up with psych and talk about if meds need to be changed around or if antidepressant needs to be added to current medical treatment.    -      Pt denies SI/HI at this time and does not present with symptoms suggesting SI/HI or attempts at hurting herself or others. She reports she feels safe and is not in any danger.  She reports good support system at home. ED precautions given.    Follow up PRN if no improvement in symptoms.    Sonia Yao) Oumar Ng, ELYSSA  Obstetrics and Gynecology  [facility]

## 2023-04-10 NOTE — TELEPHONE ENCOUNTER
Called pt to follow up on her message sent via portal.      Pt is feeling better than when she last messaged.  Not currently having any SI/HI or any active plan.  She does see a psychiatrist and counseling and was encouraged to talk to OBGYN because they believe her symptoms to be hormonal.  Pt feels safe and not in any danger.  Pt is currently out of town with her spouse and will be driving home today.  Able to accept a virtual visit today.    Scheduled pt with Apolonia today for a virtual.

## 2023-04-11 RX ORDER — PROGESTERONE 100 MG/1
100 CAPSULE ORAL NIGHTLY
Qty: 30 CAPSULE | Refills: 3 | Status: SHIPPED | OUTPATIENT
Start: 2023-04-11 | End: 2023-05-17

## 2023-04-17 ENCOUNTER — PATIENT MESSAGE (OUTPATIENT)
Dept: OBSTETRICS AND GYNECOLOGY | Facility: CLINIC | Age: 53
End: 2023-04-17
Payer: MEDICARE

## 2023-04-19 ENCOUNTER — PATIENT OUTREACH (OUTPATIENT)
Dept: ADMINISTRATIVE | Facility: HOSPITAL | Age: 53
End: 2023-04-19
Payer: MEDICARE

## 2023-04-19 ENCOUNTER — PATIENT MESSAGE (OUTPATIENT)
Dept: ADMINISTRATIVE | Facility: HOSPITAL | Age: 53
End: 2023-04-19
Payer: MEDICARE

## 2023-04-28 ENCOUNTER — PATIENT MESSAGE (OUTPATIENT)
Dept: INTERNAL MEDICINE | Facility: CLINIC | Age: 53
End: 2023-04-28
Payer: MEDICARE

## 2023-05-02 ENCOUNTER — PATIENT MESSAGE (OUTPATIENT)
Dept: OBSTETRICS AND GYNECOLOGY | Facility: CLINIC | Age: 53
End: 2023-05-02
Payer: MEDICARE

## 2023-05-03 ENCOUNTER — TELEPHONE (OUTPATIENT)
Dept: OBSTETRICS AND GYNECOLOGY | Facility: CLINIC | Age: 53
End: 2023-05-03
Payer: MEDICARE

## 2023-05-03 RX ORDER — LEVONORGESTREL / ETHINYL ESTRADIOL AND ETHINYL ESTRADIOL 150-30(84)
1 KIT ORAL DAILY
Qty: 84 EACH | Refills: 3 | Status: SHIPPED | OUTPATIENT
Start: 2023-05-03 | End: 2023-08-02

## 2023-05-17 ENCOUNTER — OFFICE VISIT (OUTPATIENT)
Dept: INTERNAL MEDICINE | Facility: CLINIC | Age: 53
End: 2023-05-17
Payer: MEDICARE

## 2023-05-17 DIAGNOSIS — Z00.00 ANNUAL PHYSICAL EXAM: ICD-10-CM

## 2023-05-17 DIAGNOSIS — Z23 NEED FOR ZOSTER VACCINE: ICD-10-CM

## 2023-05-17 DIAGNOSIS — K76.0 HEPATIC STEATOSIS: ICD-10-CM

## 2023-05-17 DIAGNOSIS — Z23 NEED FOR PNEUMOCOCCAL VACCINE: ICD-10-CM

## 2023-05-17 DIAGNOSIS — E66.01 SEVERE OBESITY: ICD-10-CM

## 2023-05-17 DIAGNOSIS — I10 ESSENTIAL HYPERTENSION: Primary | ICD-10-CM

## 2023-05-17 DIAGNOSIS — F31.9 BIPOLAR 1 DISORDER: ICD-10-CM

## 2023-05-17 DIAGNOSIS — Z12.31 ENCOUNTER FOR SCREENING MAMMOGRAM FOR MALIGNANT NEOPLASM OF BREAST: ICD-10-CM

## 2023-05-17 DIAGNOSIS — E78.2 MIXED HYPERLIPIDEMIA: ICD-10-CM

## 2023-05-17 PROCEDURE — 99215 PR OFFICE/OUTPT VISIT, EST, LEVL V, 40-54 MIN: ICD-10-PCS | Mod: 95,,, | Performed by: INTERNAL MEDICINE

## 2023-05-17 PROCEDURE — 99215 OFFICE O/P EST HI 40 MIN: CPT | Mod: 95,,, | Performed by: INTERNAL MEDICINE

## 2023-05-17 NOTE — PATIENT INSTRUCTIONS
Vaccines:  Dose 2 of Shingles (shingrix)  PCV20 = Prevnar 20      Tests to Keep You Healthy    Mammogram: DUE  Colon Cancer Screening: Met on 12/8/2020  Cervical Cancer Screening: Met on 11/3/2022  Last Blood Pressure <= 139/89 (2/10/2023): Yes      Casper Lee,     If you are due for any health screening(s) below please notify me so we can arrange them to be ordered and scheduled to maintain your health. Most healthy patients complete it. Don't lose out on improving your health.     Tests to Keep You Healthy    Mammogram: DUE  Colon Cancer Screening: Met on 12/8/2020  Cervical Cancer Screening: Met on 11/3/2022  Last Blood Pressure <= 139/89 (2/10/2023): Yes      Breast Cancer Screening    Breast cancer is the second most common cancer in women after skin cancer, and the second leading cause of death from cancer after lung cancer. Mammograms can detect breast cancer early, which significantly increases the chances of curing the cancer.      A screening mammogram is an x-ray image of the breasts used for early breast cancer detection. It can help reduce the number of deaths from breast cancer among women. To get a clear image, the breast is placed between two plastic plates to make it flat. How often a mammogram is needed depends on your age and your breast cancer risk.

## 2023-05-17 NOTE — PROGRESS NOTES
The patient location is: [place]  The chief complaint leading to consultation is: Fatty Liver    Visit type: Virtual visit with synchronous audio and video  Contact time spent with patient: 20 minutes  Each patient to whom he or she provides medical services by telemedicine is:  (1) informed of the relationship between the physician and patient and the respective role of any other health care provider with respect to management of the patient; and (2) notified that he or she may decline to receive medical services by telemedicine and may withdraw from such care at any time.  Subjective:       Patient ID: Rosa Lau is a 53 y.o. female who  has a past medical history of Bipolar disorder, Genetic testing (02/16/2022), Hyperlipidemia, Hypertension, and Macular degeneration of both eyes.    Chief Complaint: Fatty Liver     History was obtained from the patient and supplemented through chart review   -Following c hepatology for hepatic steatosis.    Her wife is Shavon Lau. Will be going to [place] in a few months.    HPI    HTN:    Pt's BP is controlled on HCTZ 12.5. Also on propranolol for anxiety/bipolar d/o.   +HEATHER as below.    Hepatic steatosis:  The ED  for RUQ abdominal pain nausea thought to be gastritis.  As workup, had CT A/P, abd US c hepatic steatosis.  Following c Hepatology. F/u in 1 year with imaging.    Diet:  Eating smaller meals d/t GERD. Working on portions.  Has seen a nutritionist in the past, which wasn't helpful since she wanted more of an individualized plan. She found a diet for BATRES from [facility], which was helpful. Her wife likes to cook.  Decreased rice, swapped to brown rice. She is vegetarian.    Exercise:  She has friends with [employer], which she can use.  Joined [employer]. She can bring a friend for free.    Obesity:  Diet, exercise as above.  BMI Readings from Last 3 Encounters:   03/13/23 37.28 kg/m²   02/16/23 37.09 kg/m²   02/10/23 37.28 kg/m²     Lab Results   Component Value  Date/Time    HGBA1C 5.0 07/23/2020 09:03 AM     HLD:  Is taking fenofibrate. Diet as above.  Lab Results   Component Value Date    LDLCALC 86.8 07/16/2021     The 10-year ASCVD risk score (Marilu DON, et al., 2019) is: 1.6%    Values used to calculate the score:      Age: 53 years      Sex: Female      Is Non- : No      Diabetic: No      Tobacco smoker: No      Systolic Blood Pressure: 120 mmHg      Is BP treated: Yes      HDL Cholesterol: 40 mg/dL      Total Cholesterol: 150 mg/dL    Bipolar disorder, LIA:  Diagnosed at age 20. Had mild cognitive impairment through neuropsych testing. Follows with OSH Psychiatry (Dr. Selena Weinstein, [facility]) and therapy. Adjusting meds.             Not addressed today.  Polycythemia:  No tobacco use, dehydration.  No family history of polycythemia, but MGM had early stroke at age 35.  Following with Heme-Onc.  Thought to be secondary due to elevated EPO.  JAK2, MPN panel neg. Tachycardia was noted during visit and she had SOB.  D-dimer positive, but CT angio without PE.  Recommended to discontinue OCPs.  Repeat PSG confirmed HEATHER. Polycythemia likely 2/2 sleep apnea, not malignant or prothrombic.     Discussed c Heme Onc, Dr. Camacho. Unlikely to be polycythema vera given neg Jak2. Epo was elevated, so rec r/o epo-secreting tumor such as RCC. CT A/P with normal kidneys, adrenal glands.    Heme Onc ok with estrogen for menopausal sx.    HEATHER as below.   Improved. Cont CPAP as tolerated. If persistent polycythemia, will resched Heme Onc for BMBx.    HEATHER:  +HA. Repeat PSG confirmed moderate HEATHER.  Following with sleep clinic.  Not able to use the CPAP for very long d/t insomnia.  Chronic insomnia.  Has peristent daytime exhaustion. Falling asleep during inappropriate times. Is on Nuvigil without relief.  Tried lunesta. Discussed sleep hygiene.  Following c OSH Psych.   Contributing to HA, fatigue, and polycythemia. F/u sleep clinic, Psych. Cont CPAP as  tolerated.    Asthma:  Uses her rescue inhaler about twice a week and symbicort daily.  Takes Allegra.  Worse c hot weather.   Refilled albuterol inhaler.    HA:   Gradual onset HA.  Wakes up with it and worsens throughout the day. HA is also worse when walking, putting away groceries. It is not worsened by recumbency.  No rhinorrhea, but does have postnasal drip.  Takes Allegra daily.  Headache resolved with Zanaflex, Medrol Dosepak.  Also on CPAP for HEATHER.  MRI brain 3/2021 s acute abnormality.  Improved. AR, HEATHER, neck pain contributing. Cont CPAP. Antihisamine, muscle relaxant PRN, neck stretches.    AR:  Likely contributing to HA as above.  Continue antihistamine.  No boggy turbinates on exam.    GERD:  Used to take PPI daily, but now able to take Protonix PRN with improvement.  Had EGD in 2023 c [facility] reportedly c gastritis. Doing better. Eating smaller meals.  Doing well. Avoid fatty, large meals, acidic foods. Cont PPI PRN. F/u c [facility].    History of colon polyp:  +FHx colon ca.   Cscope c [facility] 12/2020 normal. Repeat in 5 years, 12/2025.    Perimenopausal bleeding:   Gyn started Prometrium for menopause.   US c L ovarian cyst? Not well visualized on US.   Long and frequent menstrual bleeding. Thickened endometrial stripe. Planning on EMBx. Considering hyst.    Elevated TC score:  GM with breast ca. Saw breast clinic and discussed genetic testing.     Polyarthralgia, LBP, DDD, L shoulder pain:  Has back pain with movement. Saw OSH ortho. Had xrays c/w DDD. Rec PT which was very expensive, so had to stop. Has home exercises.    Saw OSH Rheum Dr. Escobar who seemed to focus more on mental health and dismissed FMA.    OSH Labs 7/2021 ESR/CRP neg. RF, VIRAL neg. HLA B27.  CK, uric acid wnl.     Had severe pain from gout of the R great toe that subsided.  Saw [facility] as well. Thought to be unusual for gout given age. Rec appt if it recurs.  Encouraged low-impact exercise.    FHx: MGM with RA.    Doing  much better.  Completed Healthy Back program. Seeing psych/therapy, Rheumatology.    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  She follows with optho. Takes eye supplements (AREDS, fish oil, B complex, CoQ 10, carnitine). Goes to [facility]. Vision progressively worsening.     Follows c [facility] for TBSE, rosacea.    Completed COVID bivalent booster vaccine.    Review of Systems   Constitutional:  Positive for activity change. Negative for unexpected weight change.   HENT:  Negative for hearing loss, rhinorrhea and trouble swallowing.    Eyes:  Negative for discharge and visual disturbance.   Respiratory:  Positive for wheezing. Negative for chest tightness.    Cardiovascular:  Negative for chest pain and palpitations.   Gastrointestinal:  Positive for constipation, diarrhea and vomiting. Negative for blood in stool.   Endocrine: Negative for polydipsia and polyuria.   Genitourinary:  Positive for menstrual problem. Negative for difficulty urinating, dysuria and hematuria.   Musculoskeletal:  Positive for arthralgias, joint swelling and neck pain.   Neurological:  Negative for weakness and headaches.   Psychiatric/Behavioral:  Positive for dysphoric mood. Negative for confusion.        I personally reviewed Past Medical History, Past Surgical History, Social History, and Family History.    Objective:      There were no vitals filed for this visit.     Physical Exam  Constitutional:       General: She is not in acute distress.     Appearance: She is well-developed. She is not diaphoretic.   HENT:      Head: Normocephalic and atraumatic.   Eyes:      General:         Right eye: No discharge.         Left eye: No discharge.   Pulmonary:      Effort: Pulmonary effort is normal. No respiratory distress.   Skin:     Coloration: Skin is not pale.      Findings: No erythema.   Neurological:      Mental Status: She is alert.    Psychiatric:         Behavior: Behavior normal.         Lab Results   Component Value Date    WBC 8.16 02/07/2023    HGB 13.9 02/07/2023    HCT 41.6 02/07/2023     02/07/2023    CHOL 150 07/16/2021    TRIG 116 07/16/2021    HDL 40 07/16/2021    ALT 40 02/07/2023    AST 35 02/07/2023     02/07/2023    K 4.9 02/07/2023     02/07/2023    CREATININE 0.8 02/07/2023    BUN 9 02/07/2023    CO2 22 (L) 02/07/2023    TSH 2.001 01/25/2022    HGBA1C 5.0 07/23/2020       The 10-year ASCVD risk score (Marilu DON, et al., 2019) is: 1.6%    Values used to calculate the score:      Age: 53 years      Sex: Female      Is Non- : No      Diabetic: No      Tobacco smoker: No      Systolic Blood Pressure: 120 mmHg      Is BP treated: Yes      HDL Cholesterol: 40 mg/dL      Total Cholesterol: 150 mg/dL    (Imaging have been independently reviewed)  CXR without acute abnormality. No fx.    Assessment:       1. Essential hypertension    2. Hepatic steatosis    3. Severe obesity    4. Mixed hyperlipidemia    5. Bipolar 1 disorder    6. Encounter for screening mammogram for malignant neoplasm of breast    7. Need for zoster vaccine    8. Need for pneumococcal vaccine    9. Annual physical exam          Plan:       Rosa was seen today for fatty liver.    Diagnoses and all orders for this visit:    Essential hypertension  Comments:  Controlled. Cont HCTZ 12.5, propranolol for mood. Cont lifestyle changes.    Hepatic steatosis  Comments:  No acute issues.  Continue lifestyle/dietary changes.  Following with hepatology with fibroscan in 1 year.  Orders:  -     Lipid Panel; Future    Severe obesity  Comments:  Working on diet.  Cont exercise with friends to help with accountability. Monitor A1C.  Orders:  -     Hemoglobin A1C; Future    Mixed hyperlipidemia  Comments:  FLP improved, controlled.  Cont dietary changes. Continue fenofibrate. Monitor FLP annually.  Orders:  -     Lipid Panel;  Future    Bipolar 1 disorder  Comments:  Stable. Cont current meds. F/u with OSH Psych, therapy.     Encounter for screening mammogram for malignant neoplasm of breast  -     Mammo Digital Screening Bilat w/ Richar; Future    Need for zoster vaccine  Comments:  Dose 2/2. Advised to obtain vaccine at Pharmacy.    Need for pneumococcal vaccine  Comments:  PCV 20 d/t h/o asthma, hepatic steatosis. Advised to obtain vaccine at Pharmacy.    Annual physical exam  -     Hemoglobin A1C; Future  -     Lipid Panel; Future         Side effects of medication(s) were discussed in detail and patient voiced understanding.  Patient will call back for any issues or complications.     I have spent a total of 40 minutes with the patient as well as reviewing the chart/medical record and placing orders on the day of the visit. Discussed hepatic steatosis, lifestyle changes, vaccines.     RTC in 6 month(s) or sooner PRN for HTN. Establish care with a new PCP.

## 2023-05-23 ENCOUNTER — TELEPHONE (OUTPATIENT)
Dept: INTERNAL MEDICINE | Facility: CLINIC | Age: 53
End: 2023-05-23

## 2023-05-23 NOTE — TELEPHONE ENCOUNTER
----- Message from Shavon Price MD sent at 5/17/2023  9:22 AM CDT -----  1. Please add  to the call back list to schedule a visit in 6 months for HTN, establish care with a new PCP.  2. Schedule MMG  3. Schedule labs now.  Thanks!

## 2023-05-30 ENCOUNTER — PATIENT MESSAGE (OUTPATIENT)
Dept: INTERNAL MEDICINE | Facility: CLINIC | Age: 53
End: 2023-05-30
Payer: MEDICARE

## 2023-06-09 DIAGNOSIS — E78.5 HYPERLIPIDEMIA, UNSPECIFIED HYPERLIPIDEMIA TYPE: ICD-10-CM

## 2023-06-09 NOTE — TELEPHONE ENCOUNTER
Refill Encounter    PCP Visits: Recent Visits  Date Type Provider Dept   05/17/23 Office Visit Shavon Price MD [facility]   11/01/22 Office Visit Shavon Price MD [facility]   Showing recent visits within past 360 days and meeting all other requirements  Future Appointments  No visits were found meeting these conditions.  Showing future appointments within next 720 days and meeting all other requirements     Last 3 Blood Pressure:   BP Readings from Last 3 Encounters:   02/10/23 120/80   02/07/23 (!) 131/93   02/07/23 120/70     Preferred Pharmacy:   [facility] [id number] [address] [address]  Phone: [phone] Fax: [phone]    Requested RX:  Requested Prescriptions     Pending Prescriptions Disp Refills    fenofibrate 160 MG Tab 90 tablet 3     Sig: Take 1 tablet (160 mg total) by mouth once daily.      RX Route: Normal

## 2023-06-09 NOTE — TELEPHONE ENCOUNTER
Care Due:                  Date            Visit Type   Department     Provider  --------------------------------------------------------------------------------                                ESTABLISHED                              PATIENT -    [facility] INTERNAL  Last Visit: 05-      [facility]      MEDICINE       Shavon HERNANDEZ                              ANNUAL                              CHECKUP/PHY  [facility] INTERNAL  Next Visit: 11-      [facility]       Odalys Mendoza                                                            Last  Test          Frequency    Reason                     Performed    Due Date  --------------------------------------------------------------------------------    Lipid Panel.  12 months..  fenofibrate..............  07- 07-    [facility] Embedded Care Due Messages. Reference number: [id number].   6/09/2023 2:15:08 PM CDT

## 2023-06-12 RX ORDER — FENOFIBRATE 160 MG/1
160 TABLET ORAL DAILY
Qty: 90 TABLET | Refills: 0 | Status: SHIPPED | OUTPATIENT
Start: 2023-06-12 | End: 2023-10-12 | Stop reason: SDUPTHER

## 2023-06-27 ENCOUNTER — TELEPHONE (OUTPATIENT)
Dept: INTERNAL MEDICINE | Facility: CLINIC | Age: 53
End: 2023-06-27
Payer: MEDICARE

## 2023-07-11 ENCOUNTER — TELEPHONE (OUTPATIENT)
Dept: FAMILY MEDICINE | Facility: CLINIC | Age: 53
End: 2023-07-11
Payer: MEDICARE

## 2023-07-11 ENCOUNTER — PATIENT MESSAGE (OUTPATIENT)
Dept: FAMILY MEDICINE | Facility: CLINIC | Age: 53
End: 2023-07-11
Payer: MEDICARE

## 2023-07-13 ENCOUNTER — TELEPHONE (OUTPATIENT)
Dept: FAMILY MEDICINE | Facility: CLINIC | Age: 53
End: 2023-07-13
Payer: MEDICARE

## 2023-07-13 NOTE — TELEPHONE ENCOUNTER
----- Message from Gissel Galvan sent at 7/13/2023  1:37 PM CDT -----  Name of Who is Calling:JONATAN ACUNA [[id number]]                What is the request in detail: Pt is calling because her provider Dr. Price is moving and she would like to establish care with you, but I wasn't able to schedule any apt.Please call back to further assist.                  Can the clinic reply by [facility]: No                What Number to Call Back if not in [facility]:[phone]

## 2023-07-13 NOTE — TELEPHONE ENCOUNTER
Please call patient to offer next NP appt with Dr. Mckinney.  Dr. Coe is no longer taking NP's.  Thanks

## 2023-07-15 DIAGNOSIS — K21.9 GASTROESOPHAGEAL REFLUX DISEASE: ICD-10-CM

## 2023-07-17 RX ORDER — PANTOPRAZOLE SODIUM 40 MG/1
40 TABLET, DELAYED RELEASE ORAL DAILY PRN
Qty: 90 TABLET | Refills: 0 | Status: SHIPPED | OUTPATIENT
Start: 2023-07-17 | End: 2023-10-12 | Stop reason: SDUPTHER

## 2023-08-01 ENCOUNTER — PATIENT MESSAGE (OUTPATIENT)
Dept: OBSTETRICS AND GYNECOLOGY | Facility: CLINIC | Age: 53
End: 2023-08-01
Payer: MEDICARE

## 2023-08-02 RX ORDER — ESTRADIOL 1 MG/1
1 TABLET ORAL DAILY
Qty: 90 TABLET | Refills: 3 | Status: SHIPPED | OUTPATIENT
Start: 2023-08-02 | End: 2024-08-01

## 2023-08-30 ENCOUNTER — TELEPHONE (OUTPATIENT)
Dept: PRIMARY CARE CLINIC | Facility: CLINIC | Age: 53
End: 2023-08-30
Payer: MEDICARE

## 2023-08-30 ENCOUNTER — PATIENT MESSAGE (OUTPATIENT)
Dept: HEPATOLOGY | Facility: CLINIC | Age: 53
End: 2023-08-30
Payer: MEDICARE

## 2023-08-30 ENCOUNTER — TELEPHONE (OUTPATIENT)
Dept: HEPATOLOGY | Facility: CLINIC | Age: 53
End: 2023-08-30
Payer: MEDICARE

## 2023-08-30 NOTE — TELEPHONE ENCOUNTER
Please inform patient of her upcoming appt.  Please let patient know that is the next available with Dr. Mckinney.  She was scheduled on 9/19 and Dr. Mckinney booked out and she doesn't want to see Venita so I canceled her appt with Venita on 9/19. Thanks

## 2023-08-30 NOTE — TELEPHONE ENCOUNTER
----- Message from Hali Ramires sent at 8/30/2023 11:47 AM CDT -----   Name of Who is Calling:     What is the request in detail:  patient request call back in reference to scheduled appointment / patient want to schedule only with dr kapoor  Please contact to further discuss and advise      Can the clinic reply by [facility]:     What Number to Call Back if not in [facility]:  [phone]

## 2023-08-30 NOTE — TELEPHONE ENCOUNTER
Due to Alyssa High's departure, the patient was contacted to reschedule her forthcoming appointment with Alyssa's colleagues.  There was no response, so a voicemail was left, a notification sent to patient's mychart and a letter with this notification was mailed out.

## 2023-10-11 ENCOUNTER — PATIENT MESSAGE (OUTPATIENT)
Dept: FAMILY MEDICINE | Facility: CLINIC | Age: 53
End: 2023-10-11

## 2023-10-11 ENCOUNTER — LAB VISIT (OUTPATIENT)
Dept: LAB | Facility: HOSPITAL | Age: 53
End: 2023-10-11
Attending: INTERNAL MEDICINE
Payer: MEDICARE

## 2023-10-11 ENCOUNTER — OFFICE VISIT (OUTPATIENT)
Dept: FAMILY MEDICINE | Facility: CLINIC | Age: 53
End: 2023-10-11
Attending: FAMILY MEDICINE
Payer: MEDICARE

## 2023-10-11 VITALS
SYSTOLIC BLOOD PRESSURE: 130 MMHG | HEIGHT: 58 IN | WEIGHT: 180 LBS | BODY MASS INDEX: 37.79 KG/M2 | OXYGEN SATURATION: 97 % | HEART RATE: 86 BPM | DIASTOLIC BLOOD PRESSURE: 92 MMHG

## 2023-10-11 DIAGNOSIS — R53.83 OTHER FATIGUE: ICD-10-CM

## 2023-10-11 DIAGNOSIS — R06.02 SHORTNESS OF BREATH: ICD-10-CM

## 2023-10-11 DIAGNOSIS — Z00.00 ANNUAL PHYSICAL EXAM: Primary | ICD-10-CM

## 2023-10-11 DIAGNOSIS — K21.9 GASTROESOPHAGEAL REFLUX DISEASE: ICD-10-CM

## 2023-10-11 DIAGNOSIS — K21.9 GASTROESOPHAGEAL REFLUX DISEASE, UNSPECIFIED WHETHER ESOPHAGITIS PRESENT: ICD-10-CM

## 2023-10-11 DIAGNOSIS — K76.0 HEPATIC STEATOSIS: ICD-10-CM

## 2023-10-11 DIAGNOSIS — E78.5 HYPERLIPIDEMIA, UNSPECIFIED HYPERLIPIDEMIA TYPE: ICD-10-CM

## 2023-10-11 DIAGNOSIS — E66.01 SEVERE OBESITY: ICD-10-CM

## 2023-10-11 DIAGNOSIS — E78.2 MIXED HYPERLIPIDEMIA: ICD-10-CM

## 2023-10-11 DIAGNOSIS — I10 ESSENTIAL HYPERTENSION: ICD-10-CM

## 2023-10-11 DIAGNOSIS — Z00.00 ANNUAL PHYSICAL EXAM: ICD-10-CM

## 2023-10-11 LAB
ALBUMIN SERPL BCP-MCNC: 4 G/DL (ref 3.5–5.2)
ALP SERPL-CCNC: 43 U/L (ref 55–135)
ALT SERPL W/O P-5'-P-CCNC: 20 U/L (ref 10–44)
ANION GAP SERPL CALC-SCNC: 11 MMOL/L (ref 8–16)
AST SERPL-CCNC: 28 U/L (ref 10–40)
BASOPHILS # BLD AUTO: 0.13 K/UL (ref 0–0.2)
BASOPHILS NFR BLD: 1.3 % (ref 0–1.9)
BILIRUB SERPL-MCNC: 0.5 MG/DL (ref 0.1–1)
BILIRUB UR QL STRIP: NEGATIVE
BUN SERPL-MCNC: 15 MG/DL (ref 6–20)
CALCIUM SERPL-MCNC: 9.8 MG/DL (ref 8.7–10.5)
CHLORIDE SERPL-SCNC: 107 MMOL/L (ref 95–110)
CHOLEST SERPL-MCNC: 211 MG/DL (ref 120–199)
CHOLEST/HDLC SERPL: 6 {RATIO} (ref 2–5)
CLARITY UR REFRACT.AUTO: CLEAR
CO2 SERPL-SCNC: 21 MMOL/L (ref 23–29)
COLOR UR AUTO: YELLOW
CREAT SERPL-MCNC: 0.8 MG/DL (ref 0.5–1.4)
DIFFERENTIAL METHOD: ABNORMAL
EOSINOPHIL # BLD AUTO: 0.2 K/UL (ref 0–0.5)
EOSINOPHIL NFR BLD: 1.8 % (ref 0–8)
ERYTHROCYTE [DISTWIDTH] IN BLOOD BY AUTOMATED COUNT: 13.3 % (ref 11.5–14.5)
EST. GFR  (NO RACE VARIABLE): >60 ML/MIN/1.73 M^2
ESTIMATED AVG GLUCOSE: 88 MG/DL (ref 68–131)
GLUCOSE SERPL-MCNC: 79 MG/DL (ref 70–110)
GLUCOSE UR QL STRIP: NEGATIVE
HBA1C MFR BLD: 4.7 % (ref 4–5.6)
HCT VFR BLD AUTO: 51.4 % (ref 37–48.5)
HDLC SERPL-MCNC: 35 MG/DL (ref 40–75)
HDLC SERPL: 16.6 % (ref 20–50)
HGB BLD-MCNC: 16.6 G/DL (ref 12–16)
HGB UR QL STRIP: NEGATIVE
IMM GRANULOCYTES # BLD AUTO: 0.03 K/UL (ref 0–0.04)
IMM GRANULOCYTES NFR BLD AUTO: 0.3 % (ref 0–0.5)
KETONES UR QL STRIP: NEGATIVE
LDLC SERPL CALC-MCNC: 133 MG/DL (ref 63–159)
LEUKOCYTE ESTERASE UR QL STRIP: NEGATIVE
LYMPHOCYTES # BLD AUTO: 2.8 K/UL (ref 1–4.8)
LYMPHOCYTES NFR BLD: 27.9 % (ref 18–48)
MCH RBC QN AUTO: 29.3 PG (ref 27–31)
MCHC RBC AUTO-ENTMCNC: 32.3 G/DL (ref 32–36)
MCV RBC AUTO: 91 FL (ref 82–98)
MONOCYTES # BLD AUTO: 0.5 K/UL (ref 0.3–1)
MONOCYTES NFR BLD: 4.8 % (ref 4–15)
NEUTROPHILS # BLD AUTO: 6.3 K/UL (ref 1.8–7.7)
NEUTROPHILS NFR BLD: 63.9 % (ref 38–73)
NITRITE UR QL STRIP: NEGATIVE
NONHDLC SERPL-MCNC: 176 MG/DL
NRBC BLD-RTO: 0 /100 WBC
PH UR STRIP: 8 [PH] (ref 5–8)
PLATELET # BLD AUTO: 422 K/UL (ref 150–450)
PMV BLD AUTO: 9.7 FL (ref 9.2–12.9)
POTASSIUM SERPL-SCNC: 4.5 MMOL/L (ref 3.5–5.1)
PROT SERPL-MCNC: 7.8 G/DL (ref 6–8.4)
PROT UR QL STRIP: NEGATIVE
RBC # BLD AUTO: 5.66 M/UL (ref 4–5.4)
SODIUM SERPL-SCNC: 139 MMOL/L (ref 136–145)
SP GR UR STRIP: 1.02 (ref 1–1.03)
T4 FREE SERPL-MCNC: 1.2 NG/DL (ref 0.71–1.51)
TRIGL SERPL-MCNC: 215 MG/DL (ref 30–150)
TSH SERPL DL<=0.005 MIU/L-ACNC: 0.32 UIU/ML (ref 0.4–4)
URN SPEC COLLECT METH UR: NORMAL
WBC # BLD AUTO: 9.85 K/UL (ref 3.9–12.7)

## 2023-10-11 PROCEDURE — 84439 ASSAY OF FREE THYROXINE: CPT | Performed by: FAMILY MEDICINE

## 2023-10-11 PROCEDURE — 99999 PR PBB SHADOW E&M-EST. PATIENT-LVL IV: CPT | Mod: PBBFAC,,, | Performed by: FAMILY MEDICINE

## 2023-10-11 PROCEDURE — 99214 OFFICE O/P EST MOD 30 MIN: CPT | Mod: PBBFAC,PO | Performed by: FAMILY MEDICINE

## 2023-10-11 PROCEDURE — 83036 HEMOGLOBIN GLYCOSYLATED A1C: CPT | Performed by: INTERNAL MEDICINE

## 2023-10-11 PROCEDURE — 80053 COMPREHEN METABOLIC PANEL: CPT | Performed by: FAMILY MEDICINE

## 2023-10-11 PROCEDURE — 81003 URINALYSIS AUTO W/O SCOPE: CPT | Performed by: FAMILY MEDICINE

## 2023-10-11 PROCEDURE — 84443 ASSAY THYROID STIM HORMONE: CPT | Performed by: FAMILY MEDICINE

## 2023-10-11 PROCEDURE — 80061 LIPID PANEL: CPT | Performed by: INTERNAL MEDICINE

## 2023-10-11 PROCEDURE — 99396 PREV VISIT EST AGE 40-64: CPT | Mod: S$PBB,GZ,, | Performed by: FAMILY MEDICINE

## 2023-10-11 PROCEDURE — 85025 COMPLETE CBC W/AUTO DIFF WBC: CPT | Performed by: FAMILY MEDICINE

## 2023-10-11 PROCEDURE — 36415 COLL VENOUS BLD VENIPUNCTURE: CPT | Mod: PO | Performed by: FAMILY MEDICINE

## 2023-10-11 PROCEDURE — 99999 PR PBB SHADOW E&M-EST. PATIENT-LVL IV: ICD-10-PCS | Mod: PBBFAC,,, | Performed by: FAMILY MEDICINE

## 2023-10-11 PROCEDURE — 99396 PR PREVENTIVE VISIT,EST,40-64: ICD-10-PCS | Mod: S$PBB,GZ,, | Performed by: FAMILY MEDICINE

## 2023-10-11 RX ORDER — PROPRANOLOL HYDROCHLORIDE 40 MG/1
40 TABLET ORAL 2 TIMES DAILY
Start: 2023-10-11

## 2023-10-11 NOTE — PROGRESS NOTES
"Subjective:       Patient ID: Rosa Lau is a 53 y.o. female.    Chief Complaint: Establish Care    HPI  Pt is here for annual exam pt is generally well no chest pain however pt does feel winded at times no acute sob/cp  Pt has htn stable on b blocker she is tired at times does not feel it is due to her b blocker no acute fatigue weakness but feels more tired over the past couple of months  Pt has gerd no exertional sob/cp she takes appi with improvement   Review of Systems   Constitutional:  Positive for fatigue. Negative for activity change, chills, diaphoresis and fever.   HENT:  Negative for congestion, ear discharge, ear pain, hearing loss, postnasal drip, rhinorrhea, sinus pressure, sneezing, sore throat, trouble swallowing and voice change.    Eyes:  Negative for photophobia, discharge, redness, itching and visual disturbance.   Respiratory:  Negative for cough, chest tightness, shortness of breath and wheezing.    Cardiovascular:  Negative for chest pain, palpitations and leg swelling.   Gastrointestinal:  Negative for abdominal pain, anal bleeding, blood in stool, constipation, diarrhea, nausea, rectal pain and vomiting.   Genitourinary:  Negative for dyspareunia, dysuria, flank pain, frequency, hematuria, menstrual problem, pelvic pain, urgency, vaginal bleeding and vaginal discharge.   Musculoskeletal:  Negative for arthralgias, back pain, joint swelling and neck pain.   Skin:  Negative for color change and rash.   Neurological:  Negative for dizziness, speech difficulty, weakness, light-headedness, numbness and headaches.   Hematological:  Does not bruise/bleed easily.   Psychiatric/Behavioral:  Negative for agitation, confusion, decreased concentration, sleep disturbance and suicidal ideas. The patient is not nervous/anxious.        Objective:    BP (!) 122/92 (BP Location: Left arm, Patient Position: Sitting)   Pulse 86   Ht 4' 10" (1.473 m)   Wt 81.6 kg (180 lb)   LMP 10/31/2020 (Exact " Date)   SpO2 97%   BMI 37.62 kg/m²     Physical Exam  Constitutional:       Appearance: She is well-developed. She is obese. She is not ill-appearing.   HENT:      Head: Normocephalic and atraumatic.      Right Ear: Tympanic membrane and external ear normal.      Left Ear: Tympanic membrane and external ear normal.      Nose: Nose normal.   Eyes:      General:         Right eye: No discharge.         Left eye: No discharge.      Conjunctiva/sclera: Conjunctivae normal.      Pupils: Pupils are equal, round, and reactive to light.   Neck:      Thyroid: No thyromegaly.   Cardiovascular:      Rate and Rhythm: Normal rate and regular rhythm.      Heart sounds: Normal heart sounds. No murmur heard.     No friction rub. No gallop.   Pulmonary:      Effort: Pulmonary effort is normal.      Breath sounds: Normal breath sounds. No wheezing or rales.   Abdominal:      General: Bowel sounds are normal. There is no distension.      Palpations: Abdomen is soft.      Tenderness: There is no abdominal tenderness. There is no guarding or rebound.   Genitourinary:     Vagina: Normal. No vaginal discharge.   Musculoskeletal:         General: No tenderness. Normal range of motion.      Cervical back: Normal range of motion and neck supple.   Lymphadenopathy:      Cervical: No cervical adenopathy.   Skin:     General: Skin is warm and dry.      Findings: No erythema or rash.   Neurological:      General: No focal deficit present.      Mental Status: She is alert and oriented to person, place, and time.      Cranial Nerves: No cranial nerve deficit.      Motor: No abnormal muscle tone.      Coordination: Coordination normal.   Psychiatric:         Behavior: Behavior normal.         Thought Content: Thought content normal.         Judgment: Judgment normal.         Assessment:       1. Annual physical exam    2. Gastroesophageal reflux disease, unspecified whether esophagitis present    3. Shortness of breath    4. Other fatigue    5.  "Essential hypertension        Plan:        Orders cmp lipid tsh urine   Cont meds  Avoid late meals and triggers  Consider med change but for now pt would like to go back on 40 mg b blocker  Low salt diet  Graded exercise  Increase water intake  Rtc 1 month bp log virtual     "This note will not be shared with the patient."     "

## 2023-10-12 RX ORDER — FENOFIBRATE 160 MG/1
160 TABLET ORAL DAILY
Qty: 90 TABLET | Refills: 0 | Status: SHIPPED | OUTPATIENT
Start: 2023-10-12

## 2023-10-12 RX ORDER — PANTOPRAZOLE SODIUM 40 MG/1
40 TABLET, DELAYED RELEASE ORAL DAILY PRN
Qty: 90 TABLET | Refills: 0 | Status: SHIPPED | OUTPATIENT
Start: 2023-10-12 | End: 2024-01-22 | Stop reason: SDUPTHER

## 2023-10-12 NOTE — TELEPHONE ENCOUNTER
No care due was identified.  [facility] Embedded Care Due Messages. Reference number: [id number].   10/12/2023 7:34:21 AM CDT

## 2023-10-17 ENCOUNTER — TELEPHONE (OUTPATIENT)
Dept: FAMILY MEDICINE | Facility: CLINIC | Age: 53
End: 2023-10-17
Payer: MEDICARE

## 2023-10-17 NOTE — TELEPHONE ENCOUNTER
----- Message from Noemí Mijares sent at 10/17/2023  3:28 PM CDT -----  Regarding: appt  Name of Who is Calling:JONATAN ACUNA [[id number]]          What is the request in detail:pt has an appt on 10/30. She said that she got a vm that Dr would be out and she needs to schedule for 10/31. When I went to r/s her there are a lot of appts for 10/30, but none for 31.Please call back to let her know if she needs to be r/s.           Can the clinic reply by [facility]:          What Number [facility] Call Back [facility]:[phone]

## 2023-10-30 ENCOUNTER — OFFICE VISIT (OUTPATIENT)
Dept: FAMILY MEDICINE | Facility: CLINIC | Age: 53
End: 2023-10-30
Attending: FAMILY MEDICINE
Payer: MEDICARE

## 2023-10-30 ENCOUNTER — PATIENT MESSAGE (OUTPATIENT)
Dept: FAMILY MEDICINE | Facility: CLINIC | Age: 53
End: 2023-10-30

## 2023-10-30 VITALS
DIASTOLIC BLOOD PRESSURE: 74 MMHG | HEART RATE: 72 BPM | BODY MASS INDEX: 37.79 KG/M2 | SYSTOLIC BLOOD PRESSURE: 112 MMHG | WEIGHT: 180 LBS | HEIGHT: 58 IN

## 2023-10-30 DIAGNOSIS — R71.8 ELEVATED RED BLOOD CELL COUNT: ICD-10-CM

## 2023-10-30 DIAGNOSIS — E05.90 SUBCLINICAL HYPERTHYROIDISM: ICD-10-CM

## 2023-10-30 DIAGNOSIS — I10 ESSENTIAL HYPERTENSION: Primary | ICD-10-CM

## 2023-10-30 DIAGNOSIS — E78.2 MIXED HYPERLIPIDEMIA: ICD-10-CM

## 2023-10-30 DIAGNOSIS — E66.9 OBESITY (BMI 35.0-39.9 WITHOUT COMORBIDITY): ICD-10-CM

## 2023-10-30 PROCEDURE — 99214 PR OFFICE/OUTPT VISIT, EST, LEVL IV, 30-39 MIN: ICD-10-PCS | Mod: 95,,, | Performed by: FAMILY MEDICINE

## 2023-10-30 PROCEDURE — 99214 OFFICE O/P EST MOD 30 MIN: CPT | Mod: 95,,, | Performed by: FAMILY MEDICINE

## 2023-10-30 NOTE — PROGRESS NOTES
The patient location is: home  The chief complaint leading to consultation is: htn    Visit type: audiovisual    Face to Face time with patient: 20  30 minutes of total time spent on the encounter, which includes face to face time and non-face to face time preparing to see the patient (eg, review of tests), Obtaining and/or reviewing separately obtained history, Documenting clinical information in the electronic or other health record, Independently interpreting results (not separately reported) and communicating results to the patient/family/caregiver, or Care coordination (not separately reported).         Each patient to whom he or she provides medical services by telemedicine is:  (1) informed of the relationship between the physician and patient and the respective role of any other health care provider with respect to management of the patient; and (2) notified that he or she may decline to receive medical services by telemedicine and may withdraw from such care at any time.    Notes:   Subjective:       Patient ID: Rosa Lau is a 53 y.o. female.    Chief Complaint: Hypertension    Hypertension  This is a chronic problem. The current episode started more than 1 year ago. The problem has been gradually improving since onset. The problem is resistant. Associated symptoms include anxiety, malaise/fatigue and peripheral edema. Pertinent negatives include no chest pain, palpitations or shortness of breath. Agents associated with hypertension include decongestants and oral contraceptives. Risk factors for coronary artery disease include diabetes mellitus, family history, obesity and stress. Past treatments include beta blockers and diuretics. The current treatment provides moderate improvement. Compliance problems include medication side effects.      Pt is here for follow up of htn pt is stable on hctz no muscle cramps no sob/cp Bp fine  Pt has hypercholesterolemia she is not consistently on a low fat low  "salt diet she is not exercising regularly  Pt has abnl tsh no temp intolerance or unexplained weight changes pt is obese not on weight loss diet no exercising regularly  Pt has elevated h/h she has not had this chronically she is not sure she was hydrated at that lab draw    Review of Systems   Constitutional:  Positive for malaise/fatigue. Negative for chills, fatigue and fever.   Respiratory:  Negative for cough, chest tightness and shortness of breath.    Cardiovascular:  Negative for chest pain and palpitations.   Gastrointestinal:  Negative for abdominal distention, abdominal pain and blood in stool.   Endocrine: Negative for cold intolerance and heat intolerance.       Objective:    /74   Pulse 72   Ht 4' 10" (1.473 m)   Wt 81.6 kg (180 lb)   LMP 10/31/2020 (Exact Date)   BMI 37.62 kg/m²     Physical Exam  Constitutional:       Appearance: She is obese. She is not ill-appearing.   HENT:      Head: Normocephalic and atraumatic.   Eyes:      Extraocular Movements: Extraocular movements intact.   Pulmonary:      Effort: Pulmonary effort is normal. No respiratory distress.   Neurological:      General: No focal deficit present.      Mental Status: She is alert and oriented to person, place, and time.      Cranial Nerves: No cranial nerve deficit.      Coordination: Coordination normal.   Psychiatric:         Mood and Affect: Mood normal.         Behavior: Behavior normal.         Thought Content: Thought content normal.         Judgment: Judgment normal.       H/h 17/51 in 10/2023  Assessment:         1. Htn  2. Hypercholesterolemia  3. Elevated h/h  4. Abnl tsh  5. obese  Plan:     Orders cbc tsh cmp lipid  Cont meds  Low salt low fat diet  Increase water intake  Graded exercise  Weight loss diet  Rtc 3 months       "This note will not be shared with the patient."     "

## 2023-10-31 VITALS — DIASTOLIC BLOOD PRESSURE: 74 MMHG | HEART RATE: 72 BPM | SYSTOLIC BLOOD PRESSURE: 112 MMHG

## 2023-11-06 ENCOUNTER — HOSPITAL ENCOUNTER (OUTPATIENT)
Dept: RADIOLOGY | Facility: HOSPITAL | Age: 53
Discharge: HOME | End: 2023-11-06
Attending: INTERNAL MEDICINE
Payer: MEDICARE

## 2023-11-06 VITALS — WEIGHT: 182 LBS | BODY MASS INDEX: 38.04 KG/M2

## 2023-11-06 DIAGNOSIS — Z12.31 ENCOUNTER FOR SCREENING MAMMOGRAM FOR MALIGNANT NEOPLASM OF BREAST: ICD-10-CM

## 2023-11-06 PROCEDURE — 77063 BREAST TOMOSYNTHESIS BI: CPT | Mod: 26,,, | Performed by: RADIOLOGY

## 2023-11-06 PROCEDURE — 77063 MAMMO DIGITAL SCREENING BILAT WITH TOMO: ICD-10-PCS | Mod: 26,,, | Performed by: RADIOLOGY

## 2023-11-06 PROCEDURE — 77067 SCR MAMMO BI INCL CAD: CPT | Mod: 26,,, | Performed by: RADIOLOGY

## 2023-11-06 PROCEDURE — 77067 MAMMO DIGITAL SCREENING BILAT WITH TOMO: ICD-10-PCS | Mod: 26,,, | Performed by: RADIOLOGY

## 2023-11-06 PROCEDURE — 77067 SCR MAMMO BI INCL CAD: CPT | Mod: TC

## 2023-11-16 ENCOUNTER — PATIENT MESSAGE (OUTPATIENT)
Dept: FAMILY MEDICINE | Facility: CLINIC | Age: 53
End: 2023-11-16
Payer: MEDICARE

## 2023-12-11 ENCOUNTER — PATIENT MESSAGE (OUTPATIENT)
Dept: DERMATOLOGY | Facility: CLINIC | Age: 53
End: 2023-12-11
Payer: MEDICARE

## 2023-12-22 ENCOUNTER — OFFICE VISIT (OUTPATIENT)
Dept: DERMATOLOGY | Facility: CLINIC | Age: 53
End: 2023-12-22
Payer: MEDICARE

## 2023-12-22 DIAGNOSIS — D75.1 POLYCYTHEMIA: ICD-10-CM

## 2023-12-22 DIAGNOSIS — R23.2 FLUSHING: ICD-10-CM

## 2023-12-22 DIAGNOSIS — L71.9 ROSACEA: Primary | ICD-10-CM

## 2023-12-22 PROCEDURE — 99214 OFFICE O/P EST MOD 30 MIN: CPT | Mod: 95,,, | Performed by: DERMATOLOGY

## 2023-12-22 PROCEDURE — 99214 PR OFFICE/OUTPT VISIT, EST, LEVL IV, 30-39 MIN: ICD-10-PCS | Mod: 95,,, | Performed by: DERMATOLOGY

## 2023-12-22 RX ORDER — OXYMETAZOLINE HYDROCHLORIDE 1 G/100G
CREAM TOPICAL
Qty: 30 G | Refills: 3 | Status: SHIPPED | OUTPATIENT
Start: 2023-12-22

## 2023-12-22 NOTE — PROGRESS NOTES
The patient location is: [place]  The chief complaint leading to consultation is: rosacea    Visit type: Audiovisual    Face to Face time with patient: 20   minutes of total time spent on the encounter, which includes face to face time and non-face to face time preparing to see the patient (eg, review of tests), Obtaining and/or reviewing separately obtained history, Documenting clinical information in the electronic or other health record, Independently interpreting results (not separately reported) and communicating results to the patient/family/caregiver, or Care coordination (not separately reported).     Each patient to whom he or she provides medical services by telemedicine is:  (1) informed of the relationship between the physician and patient and the respective role of any other health care provider with respect to management of the patient; and (2) notified that he or she may decline to receive medical services by telemedicine and may withdraw from such care at any time.    Subjective:       Patient ID:  Rosa Lau is a 53 y.o. female who presents for redness.    HPI  Was given Metrogel last year 7/22 for redness/flushing - was unable to tolerate every day.  Somewhat helped though.  Having hot flashes, when eating spicy foods gets very red.  Skin very sensitive.  Using tinted sunblock daily.   Taking progesterone and estrogen HRT for perimenopause.  Feels hot and burning sensation when face is very red. Cosmetically embarrassing to patient.    Does not feel headache, dizziness, nausea when red.  Of note has seen hematology in the past for elevated hgb - this was deemed to possibly be due to HEATHER. She has tried several sleep apnea devices but is unable to tolerate.   Has f/u with PCP next month for recheck labs including CBC.      Review of Systems     [facility] Evisit Qnr    12/18/2023 10:56 AM CST - Filed by Patient   How would you describe the skin condition you are experiencing? Other   You  selected Other.  Please describe your skin condition: Rosacea   Is your skin condition new, recurring (you've had something like this in the past), or chronic (skin condition has lasted for 3 months or more)? Chronic problem   Where is this skin condition located? Face;  Left cheek;  Right cheek;  Chin;  Left shoulder;  Right shoulder;  Chest   When did you first notice your skin condition?  1 year or more ago   What symptoms are you experiencing with your skin condition?  Burning;  Inflamed;  Redness   How severe are your symptoms? Moderate to severe   What factors make your skin condition worse? Friction;  Heat;  Stress;  Sweating;  Sunlight   What treatments have you tried? OTC moisturizer;  Prescription topical steroids   What was the outcome of your OTC moisturizer treatment?  Mild relief   What was the outcome of your prescription topical steroids treatment?  Mild relief   Since you first noticed your skin condition, how has it changed?  It has gradually gotten worse   Have you been exposed to any of the following recently? Animal(s);  Medication   Are you experiencing any additional symptoms? Nasal congestion   Do you have any history of Basal Cell Carcinoma (BCC) or Squamous Cell Carcinoma (SCC)?  No   Do you have any history of melanoma? No   Do you have any history of other skin disease?  No   Have you had prior use of Accutane?  No   Do you have any history of fever blisters?  No   Do you have any history of the following conditions? Allergies;  Anemia;  Anxiety;  Asthma;  Depression   Do you have a family history of any of the following medical conditions? None   Are you pregnant or think you might be?  No   Are you currently breastfeeding?  No   If needed, please use the field below to go into more detail about your condition.     At least one photo is required for the MD to provide treatment. You can upload a maximum of three photos of the affected area.           Objective:    Physical Exam    Constitutional: She appears well-developed and well-nourished. No distress.   Neurological: She is alert and oriented to person, place, and time. She is not disoriented.   Psychiatric: She has a normal mood and affect.   Skin:   Areas Examined (abnormalities noted in diagram):   Head / Face Inspection Performed  RUE Inspected              Diagram Legend     Erythematous scaling macule/papule c/w actinic keratosis       Vascular papule c/w angioma      Pigmented verrucoid papule/plaque c/w seborrheic keratosis      Yellow umbilicated papule c/w sebaceous hyperplasia      Irregularly shaped tan macule c/w lentigo     1-2 mm smooth white papules consistent with Milia      Movable subcutaneous cyst with punctum c/w epidermal inclusion cyst      Subcutaneous movable cyst c/w pilar cyst      Firm pink to brown papule c/w dermatofibroma      Pedunculated fleshy papule(s) c/w skin tag(s)      Evenly pigmented macule c/w junctional nevus     Mildly variegated pigmented, slightly irregular-bordered macule c/w mildly atypical nevus      Flesh colored to evenly pigmented papule c/w intradermal nevus       Pink pearly papule/plaque c/w basal cell carcinoma      Erythematous hyperkeratotic cursted plaque c/w SCC      Surgical scar with no sign of skin cancer recurrence      Open and closed comedones      Inflammatory papules and pustules      Verrucoid papule consistent consistent with wart     Erythematous eczematous patches and plaques     Dystrophic onycholytic nail with subungual debris c/w onychomycosis     Umbilicated papule    Erythematous-base heme-crusted tan verrucoid plaque consistent with inflamed seborrheic keratosis     Erythematous Silvery Scaling Plaque c/w Psoriasis     See annotation            Lab Results   Component Value Date    WBC 9.85 10/11/2023    HGB 16.6 (H) 10/11/2023    HCT 51.4 (H) 10/11/2023    MCV 91 10/11/2023     10/11/2023         Assessment / Plan:         Rosacea  Flushing  Polycythemia  -     RHOFADE 1 % Crea; Apply to affected area on face daily  Dispense: 30 g; Refill: 3  Green based concealer ie Cicatrix   Encouraged [facility] for product trials as they have a generous return policy even for opened products given those with rosacea being intolerable to many cosmetics    Referred to our [facility], located at our [facility] location, [address] [place] [address], [phone].  Laser treatments are typically not covered by insurance.    Encouraged to f/u with PCP and pulmonary about HEATHER, polycythemia, and menopause as these could all be contributing to her flushing    Of note taking propranolol already 40 mg po bid            No follow-ups on file.

## 2023-12-27 ENCOUNTER — TELEPHONE (OUTPATIENT)
Dept: DERMATOLOGY | Facility: CLINIC | Age: 53
End: 2023-12-27
Payer: MEDICARE

## 2023-12-27 NOTE — TELEPHONE ENCOUNTER
----- Message from Valorie Kramer MD sent at 12/22/2023  4:21 PM CST -----  Hi! Pt interested in PDL for rosacea. Please contact for next available laser clinic. Thanks!

## 2024-01-01 ENCOUNTER — PATIENT MESSAGE (OUTPATIENT)
Dept: FAMILY MEDICINE | Facility: CLINIC | Age: 54
End: 2024-01-01
Payer: MEDICARE

## 2024-01-03 ENCOUNTER — OFFICE VISIT (OUTPATIENT)
Dept: OBSTETRICS AND GYNECOLOGY | Facility: CLINIC | Age: 54
End: 2024-01-03
Attending: OBSTETRICS & GYNECOLOGY
Payer: MEDICARE

## 2024-01-03 VITALS — HEIGHT: 58 IN | BODY MASS INDEX: 39.57 KG/M2 | WEIGHT: 188.5 LBS

## 2024-01-03 DIAGNOSIS — Z13.820 SCREENING FOR OSTEOPOROSIS: Primary | ICD-10-CM

## 2024-01-03 DIAGNOSIS — Z01.419 ENCOUNTER FOR GYNECOLOGICAL EXAMINATION (GENERAL) (ROUTINE) WITHOUT ABNORMAL FINDINGS: ICD-10-CM

## 2024-01-03 DIAGNOSIS — M80.08XA AGE-RELATED OSTEOPOROSIS WITH CURRENT PATHOLOGICAL FRACTURE, VERTEBRA(E), INITIAL ENCOUNTER FOR FRACTURE: ICD-10-CM

## 2024-01-03 PROCEDURE — 3008F BODY MASS INDEX DOCD: CPT | Mod: CPTII,S$GLB,, | Performed by: OBSTETRICS & GYNECOLOGY

## 2024-01-03 PROCEDURE — G0101 CA SCREEN;PELVIC/BREAST EXAM: HCPCS | Mod: GZ,S$GLB,, | Performed by: OBSTETRICS & GYNECOLOGY

## 2024-01-03 PROCEDURE — 1159F MED LIST DOCD IN RCRD: CPT | Mod: CPTII,S$GLB,, | Performed by: OBSTETRICS & GYNECOLOGY

## 2024-01-03 PROCEDURE — 99999 PR PBB SHADOW E&M-EST. PATIENT-LVL III: CPT | Mod: PBBFAC,,, | Performed by: OBSTETRICS & GYNECOLOGY

## 2024-01-03 RX ORDER — PROGESTERONE 100 MG/1
100 CAPSULE ORAL NIGHTLY
Qty: 90 CAPSULE | Refills: 3 | Status: SHIPPED | OUTPATIENT
Start: 2024-01-03 | End: 2025-01-02

## 2024-01-03 NOTE — PROGRESS NOTES
Subjective:       Patient ID: Rosa Lau is a 53 y.o. female.    Chief Complaint:  Annual Exam (Last pap/hpv  normal/Mammo 2023 birads 1)        History of Present Illness  Rosa Lau is a 53 y.o. female  who presents for annual. Overall doing well on HRT. Bled once after changing over from OCP and then has not bled since. Last bleeding was 4 months ago. Mood is better. Joint pain is better. All questions answered. Asking about DEXA. We discussed we will wait til she is postmenopausal.    Patient's last menstrual period was 10/31/2020 (exact date).   Date of Last Pap: 2022    Review of Systems  Review of Systems   Constitutional:  Negative for chills and fever.        Objective:   Physical Exam:   Constitutional: She is oriented to person, place, and time. Vital signs are normal. She appears well-developed and well-nourished. No distress.        Pulmonary/Chest: She exhibits no mass. Right breast exhibits no mass, no nipple discharge, no skin change, no tenderness, no bleeding and no swelling. Left breast exhibits no mass, no nipple discharge, no skin change, no tenderness, no bleeding and no swelling. Breasts are symmetrical.        Abdominal: Soft. Bowel sounds are normal. She exhibits no distension and no mass. There is no abdominal tenderness. There is no rebound.     Genitourinary:    Vagina and uterus normal.   There is no rash, tenderness, lesion or injury on the right labia. There is no rash, tenderness, lesion or injury on the left labia. Cervix is normal. Right adnexum displays no mass, no tenderness and no fullness. Left adnexum displays no mass, no tenderness and no fullness. No erythema, vaginal discharge, tenderness, rectocele, cystocele or prolapse of vaginal walls in the vagina. Cervix exhibits no motion tenderness, no discharge and no friability. Uterus is not deviated, not enlarged, not fixed, not tender and not hosting fibroids.           Musculoskeletal: Normal  range of motion and moves all extremeties.      Lymphadenopathy:        Right: No supraclavicular adenopathy present.        Left: No supraclavicular adenopathy present.    Neurological: She is alert and oriented to person, place, and time.    Skin: Skin is warm and dry.    Psychiatric: She has a normal mood and affect. Her behavior is normal. Judgment normal.        Assessment/ Plan:     1. Screening for osteoporosis  CANCELED: DXA Bone Density Axial Skeleton 1 or more sites      2. Age-related osteoporosis with current pathological fracture, vertebra(e), initial encounter for fracture  CANCELED: DXA Bone Density Axial Skeleton 1 or more sites      3. Encounter for gynecological examination (general) (routine) without abnormal findings            No follow-ups on file.    As of April 1, 2021, the Cures Act has been passed nationally. This new law requires that all doctors progress notes, lab results, pathology reports and radiology reports be released IMMEDIATELY to the patient in the patient portal. That means that the results are released to you at the EXACT same time they are released to me. Therefore, with all of the patients that I have I am not able to reply to each patient exactly when the results come in. So there will be a delay from when you see the results to when I see them and have time to come up with a response to send you. Also I only see these results when I am on the computer at work. So if the results come in over the weekend or after 5 pm of a work day, I will not see them until the next business day. As you can tell, this is a challenge as a physician to give every patient the quick response they hope for and deserve. So please be patient! Thanks for understanding, Dr. Dumont

## 2024-01-04 ENCOUNTER — PATIENT MESSAGE (OUTPATIENT)
Dept: DERMATOLOGY | Facility: CLINIC | Age: 54
End: 2024-01-04
Payer: MEDICARE

## 2024-01-08 ENCOUNTER — LAB VISIT (OUTPATIENT)
Dept: LAB | Facility: OTHER | Age: 54
End: 2024-01-08
Attending: FAMILY MEDICINE
Payer: MEDICARE

## 2024-01-08 DIAGNOSIS — E78.2 MIXED HYPERLIPIDEMIA: ICD-10-CM

## 2024-01-08 DIAGNOSIS — R71.8 ELEVATED RED BLOOD CELL COUNT: ICD-10-CM

## 2024-01-08 DIAGNOSIS — E05.90 SUBCLINICAL HYPERTHYROIDISM: ICD-10-CM

## 2024-01-08 LAB
BASOPHILS # BLD AUTO: 0.12 K/UL (ref 0–0.2)
BASOPHILS NFR BLD: 1.3 % (ref 0–1.9)
CHOLEST SERPL-MCNC: 209 MG/DL (ref 120–199)
CHOLEST/HDLC SERPL: 4.4 {RATIO} (ref 2–5)
DIFFERENTIAL METHOD BLD: ABNORMAL
EOSINOPHIL # BLD AUTO: 0.4 K/UL (ref 0–0.5)
EOSINOPHIL NFR BLD: 3.8 % (ref 0–8)
ERYTHROCYTE [DISTWIDTH] IN BLOOD BY AUTOMATED COUNT: 13.1 % (ref 11.5–14.5)
HCT VFR BLD AUTO: 48.3 % (ref 37–48.5)
HDLC SERPL-MCNC: 48 MG/DL (ref 40–75)
HDLC SERPL: 23 % (ref 20–50)
HGB BLD-MCNC: 16.2 G/DL (ref 12–16)
IMM GRANULOCYTES # BLD AUTO: 0.03 K/UL (ref 0–0.04)
IMM GRANULOCYTES NFR BLD AUTO: 0.3 % (ref 0–0.5)
LDLC SERPL CALC-MCNC: 119.8 MG/DL (ref 63–159)
LYMPHOCYTES # BLD AUTO: 2.5 K/UL (ref 1–4.8)
LYMPHOCYTES NFR BLD: 27.1 % (ref 18–48)
MCH RBC QN AUTO: 29.7 PG (ref 27–31)
MCHC RBC AUTO-ENTMCNC: 33.5 G/DL (ref 32–36)
MCV RBC AUTO: 89 FL (ref 82–98)
MONOCYTES # BLD AUTO: 0.6 K/UL (ref 0.3–1)
MONOCYTES NFR BLD: 6.1 % (ref 4–15)
NEUTROPHILS # BLD AUTO: 5.6 K/UL (ref 1.8–7.7)
NEUTROPHILS NFR BLD: 61.4 % (ref 38–73)
NONHDLC SERPL-MCNC: 161 MG/DL
NRBC BLD-RTO: 0 /100 WBC
PLATELET # BLD AUTO: 321 K/UL (ref 150–450)
PMV BLD AUTO: 8.5 FL (ref 9.2–12.9)
RBC # BLD AUTO: 5.46 M/UL (ref 4–5.4)
TRIGL SERPL-MCNC: 206 MG/DL (ref 30–150)
TSH SERPL DL<=0.005 MIU/L-ACNC: 2.73 UIU/ML (ref 0.4–4)
WBC # BLD AUTO: 9.12 K/UL (ref 3.9–12.7)

## 2024-01-08 PROCEDURE — 36415 COLL VENOUS BLD VENIPUNCTURE: CPT | Performed by: FAMILY MEDICINE

## 2024-01-08 PROCEDURE — 80061 LIPID PANEL: CPT | Performed by: FAMILY MEDICINE

## 2024-01-08 PROCEDURE — 85025 COMPLETE CBC W/AUTO DIFF WBC: CPT | Performed by: FAMILY MEDICINE

## 2024-01-08 PROCEDURE — 84443 ASSAY THYROID STIM HORMONE: CPT | Performed by: FAMILY MEDICINE

## 2024-01-11 ENCOUNTER — HOSPITAL ENCOUNTER (OUTPATIENT)
Dept: RADIOLOGY | Facility: HOSPITAL | Age: 54
Discharge: HOME | End: 2024-01-11
Attending: FAMILY MEDICINE
Payer: MEDICARE

## 2024-01-11 ENCOUNTER — HOSPITAL ENCOUNTER (OUTPATIENT)
Dept: CARDIOLOGY | Facility: CLINIC | Age: 54
Discharge: HOME | End: 2024-01-11
Payer: MEDICARE

## 2024-01-11 DIAGNOSIS — R06.02 SHORTNESS OF BREATH: ICD-10-CM

## 2024-01-11 PROCEDURE — 71046 X-RAY EXAM CHEST 2 VIEWS: CPT | Mod: 26,,, | Performed by: RADIOLOGY

## 2024-01-11 PROCEDURE — 71046 X-RAY EXAM CHEST 2 VIEWS: CPT | Mod: TC

## 2024-01-11 PROCEDURE — 93005 ELECTROCARDIOGRAM TRACING: CPT | Mod: S$GLB,,, | Performed by: FAMILY MEDICINE

## 2024-01-11 PROCEDURE — 93010 ELECTROCARDIOGRAM REPORT: CPT | Mod: S$GLB,,, | Performed by: INTERNAL MEDICINE

## 2024-01-22 ENCOUNTER — PATIENT MESSAGE (OUTPATIENT)
Dept: FAMILY MEDICINE | Facility: CLINIC | Age: 54
End: 2024-01-22
Payer: MEDICARE

## 2024-01-22 DIAGNOSIS — K21.9 GASTROESOPHAGEAL REFLUX DISEASE: ICD-10-CM

## 2024-01-22 NOTE — TELEPHONE ENCOUNTER
No care due was identified.  [facility] Embedded Care Due Messages. Reference number: [id number].   1/22/2024 11:25:38 AM CST

## 2024-01-24 RX ORDER — PANTOPRAZOLE SODIUM 40 MG/1
40 TABLET, DELAYED RELEASE ORAL DAILY PRN
Qty: 90 TABLET | Refills: 0 | Status: SHIPPED | OUTPATIENT
Start: 2024-01-24 | End: 2024-04-19 | Stop reason: SDUPTHER

## 2024-01-26 ENCOUNTER — OFFICE VISIT (OUTPATIENT)
Dept: FAMILY MEDICINE | Facility: CLINIC | Age: 54
End: 2024-01-26
Attending: FAMILY MEDICINE
Payer: MEDICARE

## 2024-01-26 VITALS — WEIGHT: 188 LBS | HEIGHT: 58 IN | BODY MASS INDEX: 39.47 KG/M2

## 2024-01-26 DIAGNOSIS — E66.01 SEVERE OBESITY: ICD-10-CM

## 2024-01-26 DIAGNOSIS — F31.9 BIPOLAR 1 DISORDER: ICD-10-CM

## 2024-01-26 DIAGNOSIS — E78.2 MIXED HYPERLIPIDEMIA: Primary | ICD-10-CM

## 2024-01-26 DIAGNOSIS — J30.89 NON-SEASONAL ALLERGIC RHINITIS, UNSPECIFIED TRIGGER: ICD-10-CM

## 2024-01-26 PROCEDURE — 99214 OFFICE O/P EST MOD 30 MIN: CPT | Mod: 95,,, | Performed by: FAMILY MEDICINE

## 2024-01-26 PROCEDURE — 1160F RVW MEDS BY RX/DR IN RCRD: CPT | Mod: CPTII,95,, | Performed by: FAMILY MEDICINE

## 2024-01-26 PROCEDURE — 1159F MED LIST DOCD IN RCRD: CPT | Mod: CPTII,95,, | Performed by: FAMILY MEDICINE

## 2024-01-26 PROCEDURE — 3008F BODY MASS INDEX DOCD: CPT | Mod: CPTII,95,, | Performed by: FAMILY MEDICINE

## 2024-01-26 RX ORDER — OMEGA-3 FATTY ACIDS 1000 MG
CAPSULE ORAL
Qty: 90 CAPSULE | Refills: 0
Start: 2024-01-26

## 2024-01-26 NOTE — PROGRESS NOTES
The patient location is: home  The chief complaint leading to consultation is: cholesterol    Visit type: audiovisual    Face to Face time with patient: 20  30 minutes of total time spent on the encounter, which includes face to face time and non-face to face time preparing to see the patient (eg, review of tests), Obtaining and/or reviewing separately obtained history, Documenting clinical information in the electronic or other health record, Independently interpreting results (not separately reported) and communicating results to the patient/family/caregiver, or Care coordination (not separately reported).         Each patient to whom he or she provides medical services by telemedicine is:  (1) informed of the relationship between the physician and patient and the respective role of any other health care provider with respect to management of the patient; and (2) notified that he or she may decline to receive medical services by telemedicine and may withdraw from such care at any time.    Notes:   Subjective:       Patient ID: Rosa Lau is a 53 y.o. female.    Chief Complaint: No chief complaint on file.    HPI  Pt is in virtual visit for follow up of labs pt has hyperlipidemia she is on her fenofibrate but was not taking her omega 3  She is back on now.  She is not on a low fat diet consistently  Pt has allergies with asthma she has not been allergy tested.  She has an upcoming appt with sleep as she has sleepa apnea and not benefiting from the cpap due to allergies.    Pt has bipolar 1 followed closely in psych   Review of Systems   Constitutional:  Negative for chills, fatigue and fever.   HENT:  Positive for postnasal drip and sneezing. Negative for sinus pressure.    Respiratory:  Negative for cough, chest tightness and shortness of breath.    Cardiovascular:  Negative for chest pain and palpitations.   Gastrointestinal:  Negative for abdominal distention, abdominal pain and blood in stool.  "  Psychiatric/Behavioral:  Positive for sleep disturbance. Negative for dysphoric mood and suicidal ideas. The patient is not nervous/anxious.        Objective:    LMP 10/31/2020 (Exact Date)     Physical Exam  Constitutional:       Appearance: She is obese. She is not ill-appearing.   HENT:      Head: Normocephalic.      Nose: Congestion and rhinorrhea present.   Pulmonary:      Effort: Pulmonary effort is normal. No respiratory distress.   Neurological:      General: No focal deficit present.      Mental Status: She is alert and oriented to person, place, and time.      Cranial Nerves: No cranial nerve deficit.      Coordination: Coordination normal.   Psychiatric:         Mood and Affect: Mood normal.         Behavior: Behavior normal.         Thought Content: Thought content normal.         Judgment: Judgment normal.         Assessment:       1. Non-seasonal allergic rhinitis, unspecified trigger    2. Severe obesity    3. Bipolar 1 disorder    4. Mixed hyperlipidemia        Plan:     Orders cmp lipid  Cont meds  Cont omeg 3  Low fat weight loss diet  Graded exercise  F/u psych  Rtc 3 months       "This note will not be shared with the patient."   "

## 2024-03-04 ENCOUNTER — HOSPITAL ENCOUNTER (OUTPATIENT)
Dept: RADIOLOGY | Facility: HOSPITAL | Age: 54
Discharge: HOME | End: 2024-03-04
Attending: PHYSICIAN ASSISTANT
Payer: MEDICARE

## 2024-03-04 DIAGNOSIS — K76.0 FATTY LIVER: ICD-10-CM

## 2024-03-04 DIAGNOSIS — R16.0 HEPATOMEGALY: ICD-10-CM

## 2024-03-04 DIAGNOSIS — E78.2 MIXED HYPERLIPIDEMIA: ICD-10-CM

## 2024-03-04 PROCEDURE — 76705 ECHO EXAM OF ABDOMEN: CPT | Mod: 26,,, | Performed by: STUDENT IN AN ORGANIZED HEALTH CARE EDUCATION/TRAINING PROGRAM

## 2024-03-04 PROCEDURE — 76705 ECHO EXAM OF ABDOMEN: CPT | Mod: TC

## 2024-03-07 ENCOUNTER — PATIENT MESSAGE (OUTPATIENT)
Dept: FAMILY MEDICINE | Facility: CLINIC | Age: 54
End: 2024-03-07
Payer: MEDICARE

## 2024-03-14 ENCOUNTER — TELEPHONE (OUTPATIENT)
Dept: HEPATOLOGY | Facility: CLINIC | Age: 54
End: 2024-03-14
Payer: MEDICARE

## 2024-03-14 NOTE — TELEPHONE ENCOUNTER
Pt was called and voice mail left informing pt her 3/14 appt need to be Rs because provider will not be in clinic. Pt was asked to give the office a call back to get Rs.    Steffi

## 2024-03-15 ENCOUNTER — TELEPHONE (OUTPATIENT)
Dept: HEPATOLOGY | Facility: CLINIC | Age: 54
End: 2024-03-15
Payer: MEDICARE

## 2024-03-18 ENCOUNTER — PROCEDURE VISIT (OUTPATIENT)
Dept: HEPATOLOGY | Facility: CLINIC | Age: 54
End: 2024-03-18
Payer: MEDICARE

## 2024-03-18 DIAGNOSIS — E78.2 MIXED HYPERLIPIDEMIA: ICD-10-CM

## 2024-03-18 DIAGNOSIS — K76.0 FATTY LIVER: ICD-10-CM

## 2024-03-18 DIAGNOSIS — R16.0 HEPATOMEGALY: ICD-10-CM

## 2024-03-18 PROCEDURE — 91200 LIVER ELASTOGRAPHY: CPT | Mod: S$GLB,,, | Performed by: NURSE PRACTITIONER

## 2024-03-19 NOTE — PROCEDURES
FibroScan [place] (Vibration Controlled Transient Elastography)    Date/Time: 3/18/2024 11:00 AM    Performed by: Mikayla Downey NP  Authorized by: Alyssa Newby PA-C    Diagnosis:  NAFLD    Probe:  XL    Universal Protocol: Patient's identity, procedure and site were verified, confirmatory pause was performed.  Discussed procedure including risks and potential complications.  Questions answered.  Patient verbalizes understanding and wishes to proceed with VCTE.     Procedure: After providing explanations of the procedure, patient was placed in the supine position with right arm in maximum abduction to allow optimal exposure of right lateral abdomen.  Patient was briefly assessed, Testing was performed in the mid-axillary location, 50Hz Shear Wave pulses were applied and the resulting Shear Wave and Propagation Speed detected with a 3.5 MHz ultrasonic signal, using the FibroScan probe, Skin to liver capsule distance and liver parenchyma were accessed during the entire examination with the FibroScan probe, Patient was instructed to breathe normally and to abstain from sudden movements during the procedure, allowing for random measurements of liver stiffness. At least 10 Shear Waves were produced, Individual measurements of each Shear Wave were calculated.  Patient tolerated the procedure well with no complications.  Meets discharge criteria as was dismissed.  Rates pain 0 out of 10.  Patient will follow up with ordering provider to review results.    Findings  Median liver stiffness score:  6.2  CAP Reading: dB/m:  336    IQR/med %:  27  Interpretation  Fibrosis interpretation is based on medial liver stiffness - Kilopascal (kPa).    Fibrosis Stage:  F 0-1  Steatosis interpretation is based on controlled attenuation parameter - (dB/m).    Steatosis Grade:  S3

## 2024-03-22 ENCOUNTER — OFFICE VISIT (OUTPATIENT)
Dept: HEPATOLOGY | Facility: CLINIC | Age: 54
End: 2024-03-22
Payer: MEDICARE

## 2024-03-22 DIAGNOSIS — K76.0 FATTY LIVER: Primary | ICD-10-CM

## 2024-03-22 DIAGNOSIS — E78.2 MIXED HYPERLIPIDEMIA: ICD-10-CM

## 2024-03-22 DIAGNOSIS — E66.01 CLASS 2 SEVERE OBESITY WITH SERIOUS COMORBIDITY AND BODY MASS INDEX (BMI) OF 39.0 TO 39.9 IN ADULT, UNSPECIFIED OBESITY TYPE: ICD-10-CM

## 2024-03-22 PROBLEM — E66.812 CLASS 2 SEVERE OBESITY WITH SERIOUS COMORBIDITY AND BODY MASS INDEX (BMI) OF 39.0 TO 39.9 IN ADULT: Status: ACTIVE | Noted: 2020-08-20

## 2024-03-22 PROCEDURE — 99214 OFFICE O/P EST MOD 30 MIN: CPT | Mod: 95,,, | Performed by: NURSE PRACTITIONER

## 2024-03-22 NOTE — PROGRESS NOTES
[facility] - Established Patient    Last Clinic Visit: 3/13/23    Chief Complaint: Follow-up for fatty liver       HISTORY     This is a 53 y.o. female with PMH noted below, here for follow-up of fatty liver. Previously followed by Chacorta Newby PA-C; new patient to me.       Fatty liver noted on imaging in 2023.     Risk factors for fatty liver include:   BMI 40  HTN, HLD  No h/o heavy alcohol use.    For the most part, she has had normal liver enzymes.  Liver function and platelet count are normal.    Serologic workup has been negative for viral hepatitis.    Fibrosis staging:   Fibroscan 3/2023 = F0-F1, S3 ()  Fibroscan 3/2024 = F0-F1, S3 ()      Interval history:  Feels well overall. Having some RUQ pain, which is not new. Liver is mildly enlarged though size has decreased from last year.     No symptoms of hepatic decompensation including jaundice, ascites, cognitive problems to suggest hepatic encephalopathy, or GI bleeding.     Updates on risk factors for fatty liver:  Weight -- BMI 39  Weight is up at this time   Energy level has been lower; she notes a lot of family stress lately    Now using low calorie Mediterranean food plan (Hungry Root + net diary carol ann)  Has not been as active lately but plans to start swimming soon              Dyslipidemia -- higher though was out of her fenofibrate for a period of time and stopped her fish oil                                   Insulin resistance / diabetes -- HgbA1c 4.7          Alcohol use -- 2 beers/week     Health Maintenance:  - Most recent imaging: abd US 3/2024 without focal hepatic lesion  - Hepatitis A & B vaccination: completed 1/3        Past medical history, surgical history, problem list, family history, social history, allergies: Reviewed and updated in the appropriate section of the electronic medical record.      Current Outpatient Medications   Medication Sig Dispense Refill    cariprazine (VRAYLAR) 1.5 mg Cap once daily.       clonazePAM (KLONOPIN) 2 MG Tab Take 2 mg by mouth once daily.      dexmethylphenidate (FOCALIN) 10 MG tablet Take 1 capsule twice a day 60 tablet 0    dexmethylphenidate (FOCALIN) 10 MG tablet Take 1 capsule twice a day 60 tablet 0    estradioL (ESTRACE) 1 MG tablet Take 1 tablet (1 mg total) by mouth once daily. 90 tablet 3    fenofibrate 160 MG Tab Take 1 tablet (160 mg total) by mouth once daily. 90 tablet 0    fluticasone propionate (FLONASE) 50 mcg/actuation nasal spray 2 sprays (100 mcg total) by Each Nostril route once daily. 11.1 mL 0    hydroCHLOROthiazide (MICROZIDE) 12.5 mg capsule Take 1 capsule (12.5 mg total) by mouth once daily. 90 capsule 3    methylphenidate HCl (RITALIN) 10 MG tablet Take 1 and 1/2 tablet by mouth three times a day 135 tablet 0    [START ON 3/29/2024] methylphenidate HCl (RITALIN) 20 MG tablet Take 1 tablet by mouth twice a day and 0.25 tablet once a day 60 tablet 0    methylphenidate HCl (RITALIN) 20 MG tablet Take 1 tablet by mouth twice a day and 0.25 tablet once a day 60 tablet 0    omega-3 fatty acids 1,000 mg Cap 1 po qd otc 90 capsule 0    ondansetron (ZOFRAN-ODT) 4 MG TbDL Take 1 tablet (4 mg total) by mouth every 6 (six) hours as needed. 12 tablet 0    pantoprazole (PROTONIX) 40 MG tablet Take 1 tablet (40 mg total) by mouth daily as needed. 90 tablet 0    progesterone (PROMETRIUM) 100 MG capsule Take 1 capsule (100 mg total) by mouth nightly. 90 capsule 3    propranoloL (INDERAL) 40 MG tablet Take 1 tablet (40 mg total) by mouth 2 (two) times a day.      RHOFADE 1 % Crea Apply to affected area on face daily 30 g 3    RITALIN 10 mg tablet Take 10 mg by mouth 2 (two) times daily.      VENTOLIN HFA 90 mcg/actuation inhaler Inhale 1-2 puffs into the lungs every 6 (six) hours as needed for Wheezing or Shortness of Breath. 18 g 5     No current facility-administered medications for this visit.     Medication list reviewed and updated.      Review of Systems - as per  "HPI  Constitutional: +weight gain  Respiratory: Negative for shortness of breath.    Cardiovascular: Negative for leg swelling.  Gastrointestinal: Negative for abdominal distention or abdominal pain. Negative for melena or hematemesis.  Musculoskeletal: Negative for myalgias.    Skin: Negative for jaundice or itching.  Neurological: Negative for confusion or slowed mentation. Negative for tremors.   Hematological: Does not bruise/bleed easily.         Physical Exam - limited by virtual visit  Constitutional: No distress. Alert and oriented.  Pulmonary/Chest: No respiratory distress.   Skin: No jaundice.   Psychiatric: Normal mood and affect. Speech, behavior, and thought content normal.        LABS & DIAGNOSTIC STUDIES     I have personally reviewed pertinent laboratory findings:    Lab Results   Component Value Date    ALT 20 10/11/2023    AST 28 10/11/2023    ALKPHOS 43 (L) 10/11/2023    BILITOT 0.5 10/11/2023    ALBUMIN 4.0 10/11/2023       Lab Results   Component Value Date    WBC 9.12 01/08/2024    HGB 16.2 (H) 01/08/2024    HCT 48.3 01/08/2024    MCV 89 01/08/2024     01/08/2024       Lab Results   Component Value Date     10/11/2023    K 4.5 10/11/2023    BUN 15 10/11/2023    CREATININE 0.8 10/11/2023    ESTGFRAFRICA >60 07/16/2021    EGFRNONAA 59 (A) 07/16/2021       Lab Results   Component Value Date    HEPBSAG Non-reactive 03/02/2023    HEPCAB Non-reactive 10/06/2022       No results found for: "AFP"      I have personally reviewed the following result reports:  Abdominal US - 3/4/24      ASSESSMENT & PLAN     53 y.o. female with:    1. Fatty liver    2. Mixed hyperlipidemia    3. Class 2 severe obesity with serious comorbidity and body mass index (BMI) of 39.0 to 39.9 in adult, unspecified obesity type        Fibroscan continues to show severe steatosis but no-mild fibrosis (F0-F1). Liver enzymes remain normal.     Recommendations:  Repeat Fibroscan in 1 year  Recommend labs to monitor liver " enzymes/LFTs every 6-12 months, which can include labs with PCP  US surveillance every 2 years  Finish hep A/B vaccines  Encouraged ongoing weight loss efforts with dietary changes and exercise. Low carbohydrate/sugar, high protein/fiber diet.  Maintain good control of metabolic risk factors, specifically cholesterol      Orders Placed This Encounter   Procedures    FibroScan Transplant Hepatology(Vibration Controlled Transient Elastography)       Return to clinic in 1 year with Fibroscan.       Thank you for allowing me to participate in the care of Rosa Lau    DEDRA Garcia-JAMEL  Hepatology          The patient location is: [place], [place]  The chief complaint leading to consultation is: F/u for fatty liver    Visit type: audiovisual    Face to Face time with patient: 28 min  35 minutes of total time spent on the encounter, which includes face to face time and non-face to face time preparing to see the patient (eg, review of tests), Obtaining and/or reviewing separately obtained history, Documenting clinical information in the electronic or other health record, Independently interpreting results (not separately reported) and communicating results to the patient/family/caregiver, or Care coordination (not separately reported).     Each patient to whom he or she provides medical services by telemedicine is:  (1) informed of the relationship between the physician and patient and the respective role of any other health care provider with respect to management of the patient; and (2) notified that he or she may decline to receive medical services by telemedicine and may withdraw from such care at any time.

## 2024-03-22 NOTE — PATIENT INSTRUCTIONS
Repeat Fibroscan in 1 year  Continue weight loss efforts and good control of cholesterol for treatment of fatty liver

## 2024-04-19 DIAGNOSIS — E78.5 HYPERLIPIDEMIA, UNSPECIFIED HYPERLIPIDEMIA TYPE: ICD-10-CM

## 2024-04-19 DIAGNOSIS — K21.9 GASTROESOPHAGEAL REFLUX DISEASE: ICD-10-CM

## 2024-04-20 NOTE — TELEPHONE ENCOUNTER
No care due was identified.  [facility] Embedded Care Due Messages. Reference number: [id number].   4/19/2024 9:50:47 PM CDT

## 2024-04-22 NOTE — TELEPHONE ENCOUNTER
Refill Encounter    PCP Visits: Recent Visits  Date Type Provider Dept   01/26/24 Office Visit Luna Mckinney MD [facility] Family Medicine   10/30/23 Office Visit Luna Mckinney MD [facility]   10/11/23 Office Visit Luna Mckinney MD [facility]   05/17/23 Office Visit Shavon Kaplan MD [facility]   Showing recent visits within past 360 days and meeting all other requirements  Future Appointments  No visits were found meeting these conditions.  Showing future appointments within next 720 days and meeting all other requirements     Last 3 Blood Pressure:   BP Readings from Last 3 Encounters:   10/30/23 112/74   10/29/23 112/74   10/11/23 (!) 130/92     Preferred Pharmacy:   [address] [address]  Phone: [phone] Fax: [phone]    Requested RX:  Requested Prescriptions     Pending Prescriptions Disp Refills    fenofibrate 160 MG Tab 90 tablet 0     Sig: Take 1 tablet (160 mg total) by mouth once daily.    pantoprazole (PROTONIX) 40 MG tablet 90 tablet 0     Sig: Take 1 tablet (40 mg total) by mouth daily as needed.      RX Route: Normal

## 2024-04-23 RX ORDER — PANTOPRAZOLE SODIUM 40 MG/1
40 TABLET, DELAYED RELEASE ORAL DAILY PRN
Qty: 90 TABLET | Refills: 0 | Status: SHIPPED | OUTPATIENT
Start: 2024-04-23

## 2024-04-23 RX ORDER — FENOFIBRATE 160 MG/1
160 TABLET ORAL DAILY
Qty: 90 TABLET | Refills: 0 | Status: SHIPPED | OUTPATIENT
Start: 2024-04-23

## 2024-07-20 DIAGNOSIS — K21.9 GASTROESOPHAGEAL REFLUX DISEASE: ICD-10-CM

## 2024-07-20 DIAGNOSIS — E78.5 HYPERLIPIDEMIA, UNSPECIFIED HYPERLIPIDEMIA TYPE: ICD-10-CM

## 2024-07-20 NOTE — TELEPHONE ENCOUNTER
Care Due:                  Date            Visit Type   Department     Provider  --------------------------------------------------------------------------------                                ESTABLISHED                              PATIENT -    [facility] FAM[facility]  Last Visit: 01-      [facility]       Luna Mckinney  Next Visit: None Scheduled  None         None Found                                                            Last  Test          Frequency    Reason                     Performed    Due Date  --------------------------------------------------------------------------------    CMP.........  12 months..  fenofibrate..............  10-   10-    [facility] Embedded Care Due Messages. Reference number: [id number].   7/20/2024 9:35:17 AM CDT

## 2024-07-22 ENCOUNTER — PATIENT MESSAGE (OUTPATIENT)
Dept: OBSTETRICS AND GYNECOLOGY | Facility: CLINIC | Age: 54
End: 2024-07-22
Payer: MEDICARE

## 2024-07-22 DIAGNOSIS — N95.0 PMB (POSTMENOPAUSAL BLEEDING): Primary | ICD-10-CM

## 2024-07-22 RX ORDER — PANTOPRAZOLE SODIUM 40 MG/1
40 TABLET, DELAYED RELEASE ORAL DAILY PRN
Qty: 90 TABLET | Refills: 0 | Status: SHIPPED | OUTPATIENT
Start: 2024-07-22

## 2024-07-22 RX ORDER — FENOFIBRATE 160 MG/1
160 TABLET ORAL DAILY
Qty: 90 TABLET | Refills: 0 | Status: SHIPPED | OUTPATIENT
Start: 2024-07-22

## 2024-07-23 ENCOUNTER — PATIENT MESSAGE (OUTPATIENT)
Dept: FAMILY MEDICINE | Facility: CLINIC | Age: 54
End: 2024-07-23
Payer: MEDICARE

## 2024-07-23 DIAGNOSIS — I10 ESSENTIAL HYPERTENSION: ICD-10-CM

## 2024-07-23 NOTE — TELEPHONE ENCOUNTER
No care due was identified.  [facility] Embedded Care Due Messages. Reference number: [id number].   7/23/2024 11:36:56 AM CDT

## 2024-07-25 RX ORDER — HYDROCHLOROTHIAZIDE 12.5 MG/1
12.5 CAPSULE ORAL DAILY
Qty: 90 CAPSULE | Refills: 3 | Status: SHIPPED | OUTPATIENT
Start: 2024-07-25

## 2024-08-09 ENCOUNTER — OFFICE VISIT (OUTPATIENT)
Dept: DERMATOLOGY | Facility: CLINIC | Age: 54
End: 2024-08-09
Payer: MEDICARE

## 2024-08-09 DIAGNOSIS — D75.1 POLYCYTHEMIA: ICD-10-CM

## 2024-08-09 DIAGNOSIS — L71.9 ROSACEA: Primary | ICD-10-CM

## 2024-08-09 DIAGNOSIS — R23.2 FLUSHING: ICD-10-CM

## 2024-08-09 PROCEDURE — 99214 OFFICE O/P EST MOD 30 MIN: CPT | Mod: 95,,, | Performed by: DERMATOLOGY

## 2024-08-09 PROCEDURE — 1160F RVW MEDS BY RX/DR IN RCRD: CPT | Mod: CPTII,95,, | Performed by: DERMATOLOGY

## 2024-08-09 PROCEDURE — 1159F MED LIST DOCD IN RCRD: CPT | Mod: CPTII,95,, | Performed by: DERMATOLOGY

## 2024-08-09 PROCEDURE — G2211 COMPLEX E/M VISIT ADD ON: HCPCS | Mod: 95,,, | Performed by: DERMATOLOGY

## 2024-08-09 RX ORDER — METRONIDAZOLE 7.5 MG/G
GEL TOPICAL 2 TIMES DAILY
Qty: 45 G | Refills: 4 | Status: SHIPPED | OUTPATIENT
Start: 2024-08-09 | End: 2025-08-09

## 2024-08-09 NOTE — PROGRESS NOTES
The patient location is: [place]  The chief complaint leading to consultation is: redness      Visit type: Audiovisual    Face to Face time with patient: 20   minutes of total time spent on the encounter, which includes face to face time and non-face to face time preparing to see the patient (eg, review of tests), Obtaining and/or reviewing separately obtained history, Documenting clinical information in the electronic or other health record, Independently interpreting results (not separately reported) and communicating results to the patient/family/caregiver, or Care coordination (not separately reported).     Each patient to whom he or she provides medical services by telemedicine is:  (1) informed of the relationship between the physician and patient and the respective role of any other health care provider with respect to management of the patient; and (2) notified that he or she may decline to receive medical services by telemedicine and may withdraw from such care at any time.    Subjective:       Patient ID:  Rosa Lau is a 54 y.o. female who presents for No chief complaint on file.    HPI  Here for follow-up rosacea.  She tried Rhofade cream but did not find that it was effective.  She is taking propranolol 40 mg daily.  She has found that Metrogel is somewhat useful helping control her redness.  At last visit advised to follow up with her hematologist for polycythemia.  She has not yet made this appointment.  She denies symptoms of headaches dizziness lightheadedness diarrhea when she is red and flushed.  Review of Systems     [facility] Evisit Qnr    8/6/2024  3:07 PM CDT - Filed by Patient   How would you describe the skin condition you are experiencing? Other   You selected Other.  Please describe your skin condition: rosacea   Is your skin condition new, recurring (you've had something like this in the past), or chronic (skin condition has lasted for 3 months or more)? Chronic problem   Where is  this skin condition located? Face;  Right shoulder;  Chest   When did you first notice your skin condition?  1 year or more ago   What symptoms are you experiencing with your skin condition?  Inflamed;  Redness   How severe are your symptoms? Mild to moderate   What factors make your skin condition worse? Heat;  Menstrual cycle;  Sweating;  Sunlight   What treatments have you tried? Prescription topical steroids   What was the outcome of your prescription topical steroids treatment?  Mild relief   Since you first noticed your skin condition, how has it changed?  It is always present, but gets better and worse   Have you been exposed to any of the following recently? None   Are you experiencing any additional symptoms? Nasal congestion;  Fatigue;  Joint pain   Do you have any history of Basal Cell Carcinoma (BCC) or Squamous Cell Carcinoma (SCC)?  No   Do you have any history of melanoma? No   Do you have any history of other skin disease?  No   Have you had prior use of Accutane?  No   Do you have any history of fever blisters?  No   Do you have any history of the following conditions? Allergies;  Anxiety;  Arthritis;  Asthma   Do you have a family history of any of the following medical conditions? None   Are you pregnant or think you might be?  No   Are you currently breastfeeding?  No   If needed, please use the field below to go into more detail about your condition.     At least one photo is required for the MD to provide treatment. You can upload a maximum of three photos of the affected area.           Objective:    Physical Exam       Diagram Legend     Erythematous scaling macule/papule c/w actinic keratosis       Vascular papule c/w angioma      Pigmented verrucoid papule/plaque c/w seborrheic keratosis      Yellow umbilicated papule c/w sebaceous hyperplasia      Irregularly shaped tan macule c/w lentigo     1-2 mm smooth white papules consistent with Milia      Movable subcutaneous cyst with punctum c/w  epidermal inclusion cyst      Subcutaneous movable cyst c/w pilar cyst      Firm pink to brown papule c/w dermatofibroma      Pedunculated fleshy papule(s) c/w skin tag(s)      Evenly pigmented macule c/w junctional nevus     Mildly variegated pigmented, slightly irregular-bordered macule c/w mildly atypical nevus      Flesh colored to evenly pigmented papule c/w intradermal nevus       Pink pearly papule/plaque c/w basal cell carcinoma      Erythematous hyperkeratotic cursted plaque c/w SCC      Surgical scar with no sign of skin cancer recurrence      Open and closed comedones      Inflammatory papules and pustules      Verrucoid papule consistent consistent with wart     Erythematous eczematous patches and plaques     Dystrophic onycholytic nail with subungual debris c/w onychomycosis     Umbilicated papule    Erythematous-base heme-crusted tan verrucoid plaque consistent with inflamed seborrheic keratosis     Erythematous Silvery Scaling Plaque c/w Psoriasis     See annotation            Lab Results   Component Value Date    WBC 9.12 01/08/2024    HGB 16.2 (H) 01/08/2024    HCT 48.3 01/08/2024    MCV 89 01/08/2024     01/08/2024         Assessment / Plan:        Rosacea    Flushing    Polycythemia    Other orders  -     metroNIDAZOLE (METROGEL) 0.75 % gel; Apply topically 2 (two) times daily. To face PRN rosacea  Dispense: 45 g; Refill: 4    I do wonder how much of her flushing is being driven by her underlying polycythemia.  I advised to follow up appointment with her hematologist so that they can discuss treatment of this which may impact her flushing.    She is already taking a beta-blocker.  She denies systemic symptoms associated with her flushing.    For time being continue gentle skin care and Metrogel.         No follow-ups on file.  After hematology visit

## 2024-09-18 ENCOUNTER — PATIENT MESSAGE (OUTPATIENT)
Dept: FAMILY MEDICINE | Facility: CLINIC | Age: 54
End: 2024-09-18
Payer: MEDICARE

## 2024-09-18 ENCOUNTER — ON-DEMAND VIRTUAL (OUTPATIENT)
Dept: URGENT CARE | Facility: CLINIC | Age: 54
End: 2024-09-18
Payer: MEDICARE

## 2024-09-18 DIAGNOSIS — R03.0 ELEVATED BLOOD PRESSURE READING: Primary | ICD-10-CM

## 2024-09-18 PROCEDURE — 99212 OFFICE O/P EST SF 10 MIN: CPT | Mod: 95,,, | Performed by: NURSE PRACTITIONER
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
## 2024-10-11 ENCOUNTER — LAB VISIT (OUTPATIENT)
Dept: LAB | Facility: OTHER | Age: 54
End: 2024-10-11
Attending: FAMILY MEDICINE
Payer: MEDICARE

## 2024-10-11 DIAGNOSIS — E78.2 MIXED HYPERLIPIDEMIA: ICD-10-CM

## 2024-10-11 DIAGNOSIS — E66.01 SEVERE OBESITY: ICD-10-CM

## 2024-10-11 LAB
ALBUMIN SERPL BCP-MCNC: 3.9 G/DL (ref 3.5–5.2)
ALP SERPL-CCNC: 24 U/L (ref 55–135)
ALT SERPL W/O P-5'-P-CCNC: 11 U/L (ref 10–44)
ANION GAP SERPL CALC-SCNC: 9 MMOL/L (ref 8–16)
AST SERPL-CCNC: 14 U/L (ref 10–40)
BILIRUB SERPL-MCNC: 0.4 MG/DL (ref 0.1–1)
BUN SERPL-MCNC: 10 MG/DL (ref 6–20)
CALCIUM SERPL-MCNC: 9.7 MG/DL (ref 8.7–10.5)
CHLORIDE SERPL-SCNC: 104 MMOL/L (ref 95–110)
CHOLEST SERPL-MCNC: 174 MG/DL (ref 120–199)
CHOLEST/HDLC SERPL: 6.7 {RATIO} (ref 2–5)
CO2 SERPL-SCNC: 26 MMOL/L (ref 23–29)
CREAT SERPL-MCNC: 1 MG/DL (ref 0.5–1.4)
EST. GFR  (NO RACE VARIABLE): >60 ML/MIN/1.73 M^2
GLUCOSE SERPL-MCNC: 105 MG/DL (ref 70–110)
HDLC SERPL-MCNC: 26 MG/DL (ref 40–75)
HDLC SERPL: 14.9 % (ref 20–50)
LDLC SERPL CALC-MCNC: 110.2 MG/DL (ref 63–159)
NONHDLC SERPL-MCNC: 148 MG/DL
POTASSIUM SERPL-SCNC: 5.1 MMOL/L (ref 3.5–5.1)
PROT SERPL-MCNC: 7.3 G/DL (ref 6–8.4)
SODIUM SERPL-SCNC: 139 MMOL/L (ref 136–145)
TRIGL SERPL-MCNC: 189 MG/DL (ref 30–150)

## 2024-10-11 PROCEDURE — 80053 COMPREHEN METABOLIC PANEL: CPT | Performed by: FAMILY MEDICINE

## 2024-10-11 PROCEDURE — 80061 LIPID PANEL: CPT | Performed by: FAMILY MEDICINE

## 2024-10-11 PROCEDURE — 36415 COLL VENOUS BLD VENIPUNCTURE: CPT | Performed by: FAMILY MEDICINE

## 2024-10-19 DIAGNOSIS — K21.9 GASTROESOPHAGEAL REFLUX DISEASE: ICD-10-CM

## 2024-10-19 DIAGNOSIS — E78.5 HYPERLIPIDEMIA, UNSPECIFIED HYPERLIPIDEMIA TYPE: ICD-10-CM

## 2024-10-19 RX ORDER — FENOFIBRATE 160 MG/1
160 TABLET ORAL DAILY
Qty: 90 TABLET | Refills: 0 | Status: SHIPPED | OUTPATIENT
Start: 2024-10-19

## 2024-10-19 RX ORDER — PANTOPRAZOLE SODIUM 40 MG/1
40 TABLET, DELAYED RELEASE ORAL DAILY PRN
Qty: 90 TABLET | Refills: 0 | Status: SHIPPED | OUTPATIENT
Start: 2024-10-19

## 2024-10-19 NOTE — TELEPHONE ENCOUNTER
Refill Encounter    PCP Visits: Recent Visits  Date Type Provider Dept   01/26/24 Office Visit Luna Mckinney MD [facility] Family [facility]   10/30/23 Office Visit Luna Mckinney MD [facility]   Showing recent visits within past 360 days and meeting all other requirements  Future Appointments  Date Type Provider Dept   11/05/24 Appointment Luna Mckinney MD [facility]   Showing future appointments within next 720 days and meeting all other requirements     Last 3 Blood Pressure:   BP Readings from Last 3 Encounters:   10/30/23 112/74   10/29/23 112/74   10/11/23 (!) 130/92     Preferred Pharmacy:   [address] [address]  Phone: [phone] Fax: [phone]    Requested RX:  Requested Prescriptions     Pending Prescriptions Disp Refills    fenofibrate 160 MG Tab 90 tablet 0     Sig: Take 1 tablet (160 mg total) by mouth once daily.    pantoprazole (PROTONIX) 40 MG tablet 90 tablet 0     Sig: Take 1 tablet (40 mg total) by mouth daily as needed.      RX Route: Normal

## 2024-10-19 NOTE — TELEPHONE ENCOUNTER
Care Due:                  Date            Visit Type   Department     Provider  --------------------------------------------------------------------------------                                ESTABLISHED                              PATIENT -    [facility] FAMILY  Last Visit: 01-      VIRTUAL      MEDICINE       Luna Mckinney                              ESTABLISHED                              PATIENT -    [facility]  Next Visit: 11-      [facility]       Luna Mckinney                                                            Last  Test          Frequency    Reason                     Performed    Due Date  --------------------------------------------------------------------------------    CBC.........  12 months..  fenofibrate..............  01- 01-    Health [facility] Embedded Care Due Messages. Reference number: [id number].   10/19/2024 12:48:45 PM CDT

## 2024-11-13 ENCOUNTER — PATIENT MESSAGE (OUTPATIENT)
Dept: FAMILY MEDICINE | Facility: CLINIC | Age: 54
End: 2024-11-13
Payer: MEDICARE

## 2024-11-19 ENCOUNTER — TELEPHONE (OUTPATIENT)
Dept: FAMILY MEDICINE | Facility: CLINIC | Age: 54
End: 2024-11-19
Payer: MEDICARE

## 2024-12-19 ENCOUNTER — HOSPITAL ENCOUNTER (OUTPATIENT)
Dept: RADIOLOGY | Facility: HOSPITAL | Age: 54
Discharge: HOME | End: 2024-12-19
Attending: FAMILY MEDICINE
Payer: MEDICARE

## 2024-12-19 DIAGNOSIS — Z12.31 ENCOUNTER FOR SCREENING MAMMOGRAM FOR BREAST CANCER: ICD-10-CM

## 2024-12-19 PROCEDURE — 77067 SCR MAMMO BI INCL CAD: CPT | Mod: 26,,, | Performed by: RADIOLOGY

## 2024-12-19 PROCEDURE — 77063 BREAST TOMOSYNTHESIS BI: CPT | Mod: TC

## 2024-12-19 PROCEDURE — 77063 BREAST TOMOSYNTHESIS BI: CPT | Mod: 26,,, | Performed by: RADIOLOGY

## 2024-12-20 ENCOUNTER — OFFICE VISIT (OUTPATIENT)
Dept: FAMILY MEDICINE | Facility: CLINIC | Age: 54
End: 2024-12-20
Payer: MEDICARE

## 2024-12-20 VITALS
DIASTOLIC BLOOD PRESSURE: 92 MMHG | WEIGHT: 186.94 LBS | HEIGHT: 58 IN | SYSTOLIC BLOOD PRESSURE: 142 MMHG | HEART RATE: 85 BPM | BODY MASS INDEX: 39.24 KG/M2 | OXYGEN SATURATION: 98 % | TEMPERATURE: 98 F

## 2024-12-20 DIAGNOSIS — R00.2 PALPITATIONS: ICD-10-CM

## 2024-12-20 DIAGNOSIS — Z00.00 ANNUAL PHYSICAL EXAM: Primary | ICD-10-CM

## 2024-12-20 DIAGNOSIS — Z71.2 ENCOUNTER TO DISCUSS TEST RESULTS: ICD-10-CM

## 2024-12-20 DIAGNOSIS — E66.01 SEVERE OBESITY (BMI 35.0-39.9) WITH COMORBIDITY: ICD-10-CM

## 2024-12-20 DIAGNOSIS — I10 ESSENTIAL HYPERTENSION: ICD-10-CM

## 2024-12-20 PROCEDURE — 99999 PR PBB SHADOW E&M-EST. PATIENT-LVL IV: CPT | Mod: PBBFAC,,, | Performed by: INTERNAL MEDICINE

## 2024-12-20 RX ORDER — PROPRANOLOL HYDROCHLORIDE 20 MG/1
TABLET ORAL
COMMUNITY

## 2024-12-20 RX ORDER — CLONAZEPAM 1 MG/1
TABLET ORAL
COMMUNITY
Start: 2024-06-28

## 2024-12-20 RX ORDER — QUETIAPINE FUMARATE 50 MG/1
50 TABLET, FILM COATED ORAL NIGHTLY
COMMUNITY
Start: 2024-06-28
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
## 2025-01-05 ENCOUNTER — PATIENT MESSAGE (OUTPATIENT)
Dept: FAMILY MEDICINE | Facility: CLINIC | Age: 55
End: 2025-01-05
Payer: MEDICARE

## 2025-01-06 VITALS — DIASTOLIC BLOOD PRESSURE: 85 MMHG | SYSTOLIC BLOOD PRESSURE: 134 MMHG

## 2025-01-14 ENCOUNTER — PATIENT MESSAGE (OUTPATIENT)
Dept: FAMILY MEDICINE | Facility: CLINIC | Age: 55
End: 2025-01-14
Payer: MEDICARE

## 2025-01-14 DIAGNOSIS — E78.5 HYPERLIPIDEMIA, UNSPECIFIED HYPERLIPIDEMIA TYPE: ICD-10-CM

## 2025-01-14 DIAGNOSIS — K21.9 GASTROESOPHAGEAL REFLUX DISEASE: ICD-10-CM

## 2025-01-14 RX ORDER — FENOFIBRATE 160 MG/1
160 TABLET ORAL DAILY
Qty: 90 TABLET | Refills: 0 | Status: SHIPPED | OUTPATIENT
Start: 2025-01-14

## 2025-01-14 RX ORDER — PANTOPRAZOLE SODIUM 40 MG/1
40 TABLET, DELAYED RELEASE ORAL DAILY PRN
Qty: 90 TABLET | Refills: 0 | Status: SHIPPED | OUTPATIENT
Start: 2025-01-14

## 2025-01-14 NOTE — TELEPHONE ENCOUNTER
Care Due:                  Date            Visit Type   Department     Provider  --------------------------------------------------------------------------------                                             [facility] FAMILY                                           MEDICINE/INTERN  Last Visit: 12-      [facility]         Gladys Stapleton  Next Visit: None Scheduled  None         None Found                                                            Last  Test          Frequency    Reason                     Performed    Due Date  --------------------------------------------------------------------------------    CBC.........  12 months..  fenofibrate..............  01- 01-    Health [facility] Embedded Care Due Messages. Reference number: [id number].   1/14/2025 9:22:34 AM CST

## 2025-01-24 ENCOUNTER — PATIENT MESSAGE (OUTPATIENT)
Dept: FAMILY MEDICINE | Facility: CLINIC | Age: 55
End: 2025-01-24
Payer: MEDICARE

## 2025-01-24 VITALS — SYSTOLIC BLOOD PRESSURE: 133 MMHG | DIASTOLIC BLOOD PRESSURE: 85 MMHG

## 2025-01-30 ENCOUNTER — OFFICE VISIT (OUTPATIENT)
Dept: OBSTETRICS AND GYNECOLOGY | Facility: CLINIC | Age: 55
End: 2025-01-30
Attending: OBSTETRICS & GYNECOLOGY
Payer: MEDICARE

## 2025-01-30 VITALS — BODY MASS INDEX: 39.3 KG/M2 | WEIGHT: 188.06 LBS | DIASTOLIC BLOOD PRESSURE: 80 MMHG | SYSTOLIC BLOOD PRESSURE: 127 MMHG

## 2025-01-30 DIAGNOSIS — Z01.419 ENCOUNTER FOR GYNECOLOGICAL EXAMINATION (GENERAL) (ROUTINE) WITHOUT ABNORMAL FINDINGS: Primary | ICD-10-CM

## 2025-01-30 PROCEDURE — 99999 PR PBB SHADOW E&M-EST. PATIENT-LVL III: CPT | Mod: PBBFAC,,, | Performed by: OBSTETRICS & GYNECOLOGY

## 2025-01-30 RX ORDER — LEVONORGESTREL / ETHINYL ESTRADIOL AND ETHINYL ESTRADIOL 150-30(84)
1 KIT ORAL DAILY
Qty: 90 EACH | Refills: 3 | Status: CANCELLED | OUTPATIENT
Start: 2025-01-30 | End: 2026-01-30

## 2025-01-30 NOTE — PROGRESS NOTES
Subjective:       Patient ID: Rosa Lau is a 54 y.o. female.    Chief Complaint:  Well Woman (Last pap and HPV : 11-3-2022 normal/Last mammogram: 2024 [facility] # 1 )        History of Present Illness  Rosa Lau is a 54 y.o. female  who presents for annual, doing better on Seasonique. Now only bleeds during placebo week. Does still have occasional hot flashes. But much better than it was. We have been between HRT and OCP back and forth. At one point scheduled for hysterectomy and then canceled. For now she is happy with how things are. Plan to switch to HRT maybe next year. All questions answered. If BTB then call and will do u/s    Patient's last menstrual period was 10/31/2020 (exact date).   Date of Last Pap: 2022    Review of Systems  Review of Systems   Constitutional:  Negative for chills and fever.        Objective:   Physical Exam:   Constitutional: She is oriented to person, place, and time. Vital signs are normal. She appears well-developed and well-nourished. No distress.        Pulmonary/Chest: She exhibits no mass. Right breast exhibits no mass, no nipple discharge, no skin change, no tenderness, no bleeding and no swelling. Left breast exhibits no mass, no nipple discharge, no skin change, no tenderness, no bleeding and no swelling. Breasts are symmetrical.        Abdominal: Soft. Bowel sounds are normal. She exhibits no distension and no mass. There is no abdominal tenderness. There is no rebound.     Genitourinary:    Vagina and uterus normal.   There is no rash, tenderness, lesion or injury on the right labia. There is no rash, tenderness, lesion or injury on the left labia. Cervix is normal. Right adnexum displays no mass, no tenderness and no fullness. Left adnexum displays no mass, no tenderness and no fullness. No erythema, vaginal discharge, tenderness, rectocele, cystocele or prolapse of vaginal walls in the vagina. Cervix exhibits no motion tenderness, no  discharge and no friability. Uterus is not deviated, not enlarged, not fixed, not tender and not hosting fibroids.           Musculoskeletal: Normal range of motion and moves all extremeties.      Lymphadenopathy:        Right: No supraclavicular adenopathy present.        Left: No supraclavicular adenopathy present.    Neurological: She is alert and oriented to person, place, and time.    Skin: Skin is warm and dry.    Psychiatric: She has a normal mood and affect. Her behavior is normal. Judgment normal.        Assessment/ Plan:     1. Encounter for gynecological examination (general) (routine) without abnormal findings            Follow up in about 1 year (around 1/30/2026).    As of April 1, 2021, the Cures Act has been passed nationally. This new law requires that all doctors progress notes, lab results, pathology reports and radiology reports be released IMMEDIATELY to the patient in the patient portal. That means that the results are released to you at the EXACT same time they are released to me. Therefore, with all of the patients that I have I am not able to reply to each patient exactly when the results come in. So there will be a delay from when you see the results to when I see them and have time to come up with a response to send you. Also I only see these results when I am on the computer at work. So if the results come in over the weekend or after 5 pm of a work day, I will not see them until the next business day. As you can tell, this is a challenge as a physician to give every patient the quick response they hope for and deserve. So please be patient! Thanks for understanding, Dr. Dumont

## 2025-02-05 ENCOUNTER — OFFICE VISIT (OUTPATIENT)
Dept: FAMILY MEDICINE | Facility: CLINIC | Age: 55
End: 2025-02-05
Payer: MEDICARE

## 2025-02-05 VITALS
RESPIRATION RATE: 16 BRPM | DIASTOLIC BLOOD PRESSURE: 82 MMHG | WEIGHT: 190.94 LBS | TEMPERATURE: 99 F | HEIGHT: 58 IN | BODY MASS INDEX: 40.08 KG/M2 | HEART RATE: 104 BPM | SYSTOLIC BLOOD PRESSURE: 120 MMHG | OXYGEN SATURATION: 97 %

## 2025-02-05 DIAGNOSIS — E78.2 MIXED HYPERLIPIDEMIA: ICD-10-CM

## 2025-02-05 DIAGNOSIS — R59.1 LYMPHADENOPATHY: Primary | ICD-10-CM

## 2025-02-05 DIAGNOSIS — F31.9 BIPOLAR 1 DISORDER: ICD-10-CM

## 2025-02-05 DIAGNOSIS — I10 ESSENTIAL HYPERTENSION: ICD-10-CM

## 2025-02-05 DIAGNOSIS — E66.01 SEVERE OBESITY (BMI 35.0-39.9) WITH COMORBIDITY: ICD-10-CM

## 2025-02-05 PROBLEM — H35.30 MYOPIC MACULAR DEGENERATION: Status: ACTIVE | Noted: 2017-06-18

## 2025-02-05 PROBLEM — M72.2 PLANTAR FASCIITIS: Status: ACTIVE | Noted: 2017-12-17

## 2025-02-05 PROBLEM — F41.8 MIXED ANXIETY AND DEPRESSIVE DISORDER: Status: ACTIVE | Noted: 2017-06-18

## 2025-02-05 PROBLEM — F41.8 MIXED ANXIETY AND DEPRESSIVE DISORDER: Status: RESOLVED | Noted: 2017-06-18 | Resolved: 2025-02-05

## 2025-02-05 PROCEDURE — 3044F HG A1C LEVEL LT 7.0%: CPT | Mod: HCNC,CPTII,S$GLB, | Performed by: NURSE PRACTITIONER

## 2025-02-05 PROCEDURE — 3008F BODY MASS INDEX DOCD: CPT | Mod: HCNC,CPTII,S$GLB, | Performed by: NURSE PRACTITIONER

## 2025-02-05 PROCEDURE — 3079F DIAST BP 80-89 MM HG: CPT | Mod: HCNC,CPTII,S$GLB, | Performed by: NURSE PRACTITIONER

## 2025-02-05 PROCEDURE — 3074F SYST BP LT 130 MM HG: CPT | Mod: HCNC,CPTII,S$GLB, | Performed by: NURSE PRACTITIONER

## 2025-02-05 PROCEDURE — 1159F MED LIST DOCD IN RCRD: CPT | Mod: HCNC,CPTII,S$GLB, | Performed by: NURSE PRACTITIONER

## 2025-02-05 PROCEDURE — 99999 PR PBB SHADOW E&M-EST. PATIENT-LVL IV: CPT | Mod: PBBFAC,HCNC,, | Performed by: NURSE PRACTITIONER

## 2025-02-05 PROCEDURE — 1160F RVW MEDS BY RX/DR IN RCRD: CPT | Mod: HCNC,CPTII,S$GLB, | Performed by: NURSE PRACTITIONER

## 2025-02-05 PROCEDURE — 99214 OFFICE O/P EST MOD 30 MIN: CPT | Mod: HCNC,S$GLB,, | Performed by: NURSE PRACTITIONER

## 2025-02-07 ENCOUNTER — PATIENT MESSAGE (OUTPATIENT)
Dept: FAMILY MEDICINE | Facility: CLINIC | Age: 55
End: 2025-02-07
Payer: MEDICARE

## 2025-02-10 ENCOUNTER — PATIENT MESSAGE (OUTPATIENT)
Dept: FAMILY MEDICINE | Facility: CLINIC | Age: 55
End: 2025-02-10
Payer: MEDICARE

## 2025-02-10 DIAGNOSIS — E78.1 HYPERTRIGLYCERIDEMIA: Primary | ICD-10-CM

## 2025-02-10 DIAGNOSIS — J30.89 NON-SEASONAL ALLERGIC RHINITIS, UNSPECIFIED TRIGGER: Primary | ICD-10-CM

## 2025-02-10 DIAGNOSIS — G47.33 OSA (OBSTRUCTIVE SLEEP APNEA): ICD-10-CM

## 2025-02-10 DIAGNOSIS — E61.1 IRON DEFICIENCY: ICD-10-CM

## 2025-02-10 RX ORDER — OMEGA-3-ACID ETHYL ESTERS 1 G/1
2 CAPSULE, LIQUID FILLED ORAL DAILY
Qty: 180 CAPSULE | Refills: 3 | Status: SHIPPED | OUTPATIENT
Start: 2025-02-10 | End: 2026-02-10

## 2025-02-10 NOTE — PROGRESS NOTES
"Subjective:      Patient ID: Rosa Lau is a 54 y.o. female.  New to me but seen previously in clinic by a fellow provider. Pt presents to clinic with possible swollen lymph nodes in back of neck that comes and goes, now more noticeable since having mono 3mths ago. Reports no recent illness or trauma, though neck occasionally aches and swells. Of note, pt continues under the care of psychiatry and doing well with medications.       Review of Systems   Constitutional:  Negative for activity change, appetite change, fatigue, fever, night sweats and unexpected weight change.   HENT:  Positive for postnasal drip. Negative for nasal congestion, dental problem, drooling, ear discharge, ear pain, facial swelling, hearing loss, mouth dryness, mouth sores, nosebleeds, rhinorrhea, sinus pressure/congestion, sneezing, sore throat, tinnitus, trouble swallowing, voice change and goiter.    Respiratory:  Negative for chest tightness and shortness of breath.    Cardiovascular:  Negative for chest pain and palpitations.   Gastrointestinal:  Negative for abdominal pain, change in bowel habit, constipation, diarrhea, nausea and vomiting.   Genitourinary:  Negative for difficulty urinating, dysuria and menstrual problem.   Musculoskeletal:  Negative for arthralgias, back pain, gait problem and myalgias.   Integumentary:  Negative for color change and rash.   Neurological:  Negative for weakness and headaches.   Hematological:  Positive for adenopathy.   Psychiatric/Behavioral: Negative.     All other systems reviewed and are negative.        Objective:     Vitals:    02/05/25 0937   BP: 120/82   Pulse: 104   Resp: 16   Temp: 98.5 °F (36.9 °C)   TempSrc: Oral   SpO2: 97%   Weight: 86.6 kg (190 lb 14.7 oz)   Height: 4' 10" (1.473 m)     Physical Exam  Vitals and nursing note reviewed.   Constitutional:       General: She is not in acute distress.     Appearance: Normal appearance. She is well-developed and well-groomed. She is " not ill-appearing.   HENT:      Head: Normocephalic and atraumatic.      Right Ear: Ear canal and external ear normal. No middle ear effusion. No mastoid tenderness. Tympanic membrane is injected. Tympanic membrane is not scarred, perforated, erythematous, retracted or bulging.      Left Ear: Tympanic membrane, ear canal and external ear normal.  No middle ear effusion. No mastoid tenderness. Tympanic membrane is not injected, scarred, perforated, erythematous, retracted or bulging.      Nose: Mucosal edema present. No congestion or rhinorrhea.      Right Sinus: No maxillary sinus tenderness or frontal sinus tenderness.      Left Sinus: No maxillary sinus tenderness or frontal sinus tenderness.      Mouth/Throat:      Lips: Pink.      Mouth: Mucous membranes are moist.      Pharynx: Oropharynx is clear. Uvula midline. Postnasal drip present. No pharyngeal swelling, oropharyngeal exudate, posterior oropharyngeal erythema or uvula swelling.   Eyes:      General: Lids are normal. Vision grossly intact. Gaze aligned appropriately.      Conjunctiva/sclera: Conjunctivae normal.      Pupils: Pupils are equal, round, and reactive to light.   Neck:      Trachea: Phonation normal.   Cardiovascular:      Rate and Rhythm: Regular rhythm. Tachycardia present.      Heart sounds: Normal heart sounds.   Pulmonary:      Effort: Pulmonary effort is normal. No accessory muscle usage or respiratory distress.      Breath sounds: Normal breath sounds and air entry.   Abdominal:      General: Abdomen is flat. Bowel sounds are normal. There is no distension.      Palpations: Abdomen is soft.      Tenderness: There is no abdominal tenderness.   Musculoskeletal:      Cervical back: Neck supple.      Right lower leg: No edema.      Left lower leg: No edema.   Lymphadenopathy:      Head:      Right side of head: Posterior auricular and occipital adenopathy present. No submental, submandibular, tonsillar or preauricular adenopathy.      Left  side of head: No submental, submandibular, tonsillar, preauricular, posterior auricular or occipital adenopathy.      Cervical: Cervical adenopathy present.      Right cervical: No superficial, deep or posterior cervical adenopathy.     Left cervical: No superficial, deep or posterior cervical adenopathy.   Skin:     General: Skin is warm and dry.      Findings: No rash.   Neurological:      General: No focal deficit present.      Mental Status: She is alert and oriented to person, place, and time. Mental status is at baseline.      Sensory: Sensation is intact.      Motor: Motor function is intact.      Coordination: Coordination is intact.      Gait: Gait is intact.   Psychiatric:         Attention and Perception: Attention and perception normal.         Mood and Affect: Mood normal. Affect is blunt. Affect is not inappropriate.         Speech: Speech normal.         Behavior: Behavior normal. Behavior is cooperative.         Thought Content: Thought content normal.         Cognition and Memory: Cognition and memory normal.         Judgment: Judgment normal.        Latest Reference Range & Units 02/05/25 10:33   WBC 3.90 - 12.70 K/uL 6.83   RBC 4.00 - 5.40 M/uL 4.90   Hemoglobin 12.0 - 16.0 g/dL 14.8   Hematocrit 37.0 - 48.5 % 45.7   MCV 82 - 98 fL 93   MCH 27.0 - 31.0 pg 30.2   MCHC 32.0 - 36.0 g/dL 32.4   RDW 11.5 - 14.5 % 12.1   Platelet Count 150 - 450 K/uL 314   MPV 9.2 - 12.9 fL 9.1 (L)   Gran % 38.0 - 73.0 % 54.6   Lymph % 18.0 - 48.0 % 31.3   Mono % 4.0 - 15.0 % 7.2   Eos % 0.0 - 8.0 % 5.1   Basophil % 0.0 - 1.9 % 1.5   Immature Granulocytes 0.0 - 0.5 % 0.3   Gran # (ANC) 1.8 - 7.7 K/uL 3.7   Lymph # 1.0 - 4.8 K/uL 2.1   Mono # 0.3 - 1.0 K/uL 0.5   Eos # 0.0 - 0.5 K/uL 0.4   Baso # 0.00 - 0.20 K/uL 0.10   Immature Grans (Abs) 0.00 - 0.04 K/uL 0.02   nRBC 0 /100 WBC 0   Differential Method  Automated   Iron 30 - 160 ug/dL 87   TIBC 250 - 450 ug/dL 525 (H)   Saturated Iron 20 - 50 % 17 (L)   Transferrin 200  - 375 mg/dL 355   Ferritin 20.0 - 300.0 ng/mL 140   Folate 4.0 - 24.0 ng/mL 10.3   Vitamin B12 210 - 950 pg/mL 227   Sed Rate 0 - 36 mm/Hr <2   Sodium 136 - 145 mmol/L 139   Potassium 3.5 - 5.1 mmol/L 4.5   Chloride 95 - 110 mmol/L 105   CO2 23 - 29 mmol/L 25   Anion Gap 8 - 16 mmol/L 9   BUN 6 - 20 mg/dL 15   Creatinine 0.5 - 1.4 mg/dL 0.9   eGFR >60 mL/min/1.73 m^2 >60   Glucose 70 - 110 mg/dL 105   Calcium 8.7 - 10.5 mg/dL 9.2   Magnesium  1.6 - 2.6 mg/dL 1.7   ALP 40 - 150 U/L 27 (L)   PROTEIN TOTAL 6.0 - 8.4 g/dL 7.0   Albumin 3.5 - 5.2 g/dL 3.7   BILIRUBIN TOTAL 0.1 - 1.0 mg/dL 0.3   AST 10 - 40 U/L 20   ALT 10 - 44 U/L 15   CRP 0.0 - 8.2 mg/L 2.3   Cholesterol Total 120 - 199 mg/dL 177   HDL 40 - 75 mg/dL 32 (L)   HDL/Cholesterol Ratio 20.0 - 50.0 % 18.1 (L)   Non-HDL Cholesterol mg/dL 145   Total Cholesterol/HDL Ratio 2.0 - 5.0  5.5 (H)   Triglycerides 30 - 150 mg/dL 190 (H)   LDL Cholesterol 63.0 - 159.0 mg/dL 107.0   Vitamin D 30 - 96 ng/mL 35   Hemoglobin A1C External 4.0 - 5.6 % 4.8   Estimated Avg Glucose 68 - 131 mg/dL 91   TSH 0.400 - 4.000 uIU/mL 1.775   Free T4 0.71 - 1.51 ng/dL 1.14   (L): Data is abnormally low  (H): Data is abnormally high  Assessment and Plan:     1. Lymphadenopathy (Primary)  Allergic vs inflammatory, no signs of bacterial infection or acute distress  Check labs and consider ultrasound if fails to improve or worsens   - CBC Auto Differential; Future  - Comprehensive Metabolic Panel; Future  - C-REACTIVE PROTEIN; Future  - Sedimentation rate; Future    2. Severe obesity (BMI 35.0-39.9) with comorbidity  General weight loss/lifestyle modification strategies discussed (elicit support from others; identify saboteurs; non-food rewards, etc).  Behavioral treatment: stress management.  Diet interventions: qualitative changes (increase low-fat,  high-fiber foods).  Informal exercise measures discussed, e.g. taking stairs instead of elevator.  Regular aerobic exercise program  discussed.  - Ferritin; Future  - Folate; Future  - Hemoglobin A1C; Future  - Magnesium; Future  - Iron and TIBC; Future  - Vitamin D; Future  - Vitamin B12; Future    3. Bipolar 1 disorder  Mood stable on current regimen, continue psychiatry care  Instructed patient to contact office or on-call physician promptly should condition worsen or any new symptoms appear and provided on-call telephone numbers. IF THE PATIENT HAS ANY SUICIDAL OR HOMICIDAL IDEATIONS, CALL THE OFFICE, DISCUSS WITH A SUPPORT MEMBER, OR GO TO THE ER IMMEDIATELY. Patient was agreeable with this plan.  - Vitamin B12; Future    4. Mixed hyperlipidemia  Continue dietary measures.  Continue regular exercise.  Lipid-lowering medications: None indicated unless lipids or risk profile worsen in the future..  - Hemoglobin A1C; Future  - Lipid Panel; Future  - Vitamin D; Future  - TSH; Future  - T4, Free; Future    5. Essential hypertension  Continue current treatment regimen.  Dietary sodium restriction.  Regular aerobic exercise.  Weight loss.  Patient Education: Reviewed risks of hypertension and principles of treatment.  - Ferritin; Future  - Folate; Future  - Hemoglobin A1C; Future  - Magnesium; Future  - Iron and TIBC; Future  - Vitamin D; Future  - Vitamin B12; Future           VANNA Bhatia, FNP-C  Family/Internal Medicine  [facility]

## 2025-02-11 DIAGNOSIS — R09.81 NASAL CONGESTION: ICD-10-CM

## 2025-02-11 DIAGNOSIS — G47.33 OSA (OBSTRUCTIVE SLEEP APNEA): Primary | ICD-10-CM

## 2025-02-21 DIAGNOSIS — Z00.00 ENCOUNTER FOR MEDICARE ANNUAL WELLNESS EXAM: ICD-10-CM

## 2025-03-21 ENCOUNTER — TELEPHONE (OUTPATIENT)
Dept: HEPATOLOGY | Facility: CLINIC | Age: 55
End: 2025-03-21
Payer: MEDICARE

## 2025-03-21 DIAGNOSIS — I10 ESSENTIAL HYPERTENSION: ICD-10-CM

## 2025-03-21 DIAGNOSIS — K76.0 HEPATIC STEATOSIS: Primary | ICD-10-CM

## 2025-03-21 DIAGNOSIS — E66.01 SEVERE OBESITY (BMI 35.0-39.9) WITH COMORBIDITY: ICD-10-CM

## 2025-03-21 DIAGNOSIS — E78.2 MIXED HYPERLIPIDEMIA: ICD-10-CM

## 2025-03-21 NOTE — TELEPHONE ENCOUNTER
----- Message from Nayely sent at 3/21/2025  8:33 AM CDT -----  Regarding: Appt  Contact: [phone]  Current Appt date: 03/21/25 Type of Appt: US, F/U Physician: Rosalinda Jordan, NPReason for rescheduling: Care trouble  Caller:  Rosa Contact Preference: [phone]

## 2025-04-07 ENCOUNTER — PATIENT MESSAGE (OUTPATIENT)
Dept: SLEEP MEDICINE | Facility: CLINIC | Age: 55
End: 2025-04-07
Payer: MEDICARE

## 2025-04-08 ENCOUNTER — OFFICE VISIT (OUTPATIENT)
Dept: FAMILY MEDICINE | Facility: CLINIC | Age: 55
End: 2025-04-08
Payer: MEDICARE

## 2025-04-08 ENCOUNTER — TELEPHONE (OUTPATIENT)
Dept: SLEEP MEDICINE | Facility: CLINIC | Age: 55
End: 2025-04-08
Payer: MEDICARE

## 2025-04-08 ENCOUNTER — LAB VISIT (OUTPATIENT)
Dept: LAB | Facility: HOSPITAL | Age: 55
End: 2025-04-08
Attending: NURSE PRACTITIONER
Payer: MEDICARE

## 2025-04-08 VITALS
DIASTOLIC BLOOD PRESSURE: 83 MMHG | OXYGEN SATURATION: 97 % | BODY MASS INDEX: 39.9 KG/M2 | HEIGHT: 58 IN | TEMPERATURE: 98 F | SYSTOLIC BLOOD PRESSURE: 134 MMHG

## 2025-04-08 DIAGNOSIS — B96.89 BACTERIAL SINUSITIS: Primary | ICD-10-CM

## 2025-04-08 DIAGNOSIS — J32.9 BACTERIAL SINUSITIS: Primary | ICD-10-CM

## 2025-04-08 DIAGNOSIS — R53.83 FATIGUE, UNSPECIFIED TYPE: ICD-10-CM

## 2025-04-08 LAB
ABSOLUTE EOSINOPHIL (OHS): 0.2 K/UL
ABSOLUTE MONOCYTE (OHS): 0.56 K/UL (ref 0.3–1)
ABSOLUTE NEUTROPHIL COUNT (OHS): 6.31 K/UL (ref 1.8–7.7)
BASOPHILS # BLD AUTO: 0.14 K/UL
BASOPHILS NFR BLD AUTO: 1.4 %
ERYTHROCYTE [DISTWIDTH] IN BLOOD BY AUTOMATED COUNT: 12.6 % (ref 11.5–14.5)
HCT VFR BLD AUTO: 45.7 % (ref 37–48.5)
HGB BLD-MCNC: 15.2 GM/DL (ref 12–16)
IMM GRANULOCYTES # BLD AUTO: 0.03 K/UL (ref 0–0.04)
IMM GRANULOCYTES NFR BLD AUTO: 0.3 % (ref 0–0.5)
LYMPHOCYTES # BLD AUTO: 2.85 K/UL (ref 1–4.8)
MCH RBC QN AUTO: 30.5 PG (ref 27–31)
MCHC RBC AUTO-ENTMCNC: 33.3 G/DL (ref 32–36)
MCV RBC AUTO: 92 FL (ref 82–98)
NUCLEATED RBC (/100WBC) (OHS): 0 /100 WBC
PLATELET # BLD AUTO: 345 K/UL (ref 150–450)
PMV BLD AUTO: 9.3 FL (ref 9.2–12.9)
RBC # BLD AUTO: 4.98 M/UL (ref 4–5.4)
RELATIVE EOSINOPHIL (OHS): 2 %
RELATIVE LYMPHOCYTE (OHS): 28.2 % (ref 18–48)
RELATIVE MONOCYTE (OHS): 5.6 % (ref 4–15)
RELATIVE NEUTROPHIL (OHS): 62.5 % (ref 38–73)
WBC # BLD AUTO: 10.09 K/UL (ref 3.9–12.7)

## 2025-04-08 PROCEDURE — 36415 COLL VENOUS BLD VENIPUNCTURE: CPT | Mod: HCNC,PO

## 2025-04-08 PROCEDURE — 3044F HG A1C LEVEL LT 7.0%: CPT | Mod: HCNC,CPTII,S$GLB, | Performed by: NURSE PRACTITIONER

## 2025-04-08 PROCEDURE — 3079F DIAST BP 80-89 MM HG: CPT | Mod: HCNC,CPTII,S$GLB, | Performed by: NURSE PRACTITIONER

## 2025-04-08 PROCEDURE — 1160F RVW MEDS BY RX/DR IN RCRD: CPT | Mod: HCNC,CPTII,S$GLB, | Performed by: NURSE PRACTITIONER

## 2025-04-08 PROCEDURE — 99999 PR PBB SHADOW E&M-EST. PATIENT-LVL IV: CPT | Mod: PBBFAC,HCNC,, | Performed by: NURSE PRACTITIONER

## 2025-04-08 PROCEDURE — 3008F BODY MASS INDEX DOCD: CPT | Mod: HCNC,CPTII,S$GLB, | Performed by: NURSE PRACTITIONER

## 2025-04-08 PROCEDURE — 1159F MED LIST DOCD IN RCRD: CPT | Mod: HCNC,CPTII,S$GLB, | Performed by: NURSE PRACTITIONER

## 2025-04-08 PROCEDURE — 85025 COMPLETE CBC W/AUTO DIFF WBC: CPT | Mod: HCNC

## 2025-04-08 PROCEDURE — 3075F SYST BP GE 130 - 139MM HG: CPT | Mod: HCNC,CPTII,S$GLB, | Performed by: NURSE PRACTITIONER

## 2025-04-08 PROCEDURE — 99213 OFFICE O/P EST LOW 20 MIN: CPT | Mod: HCNC,S$GLB,, | Performed by: NURSE PRACTITIONER

## 2025-04-08 RX ORDER — QUETIAPINE FUMARATE 100 MG/1
100 TABLET, FILM COATED ORAL NIGHTLY
COMMUNITY
Start: 2025-03-11

## 2025-04-08 RX ORDER — SUVOREXANT 15 MG/1
TABLET, FILM COATED ORAL
COMMUNITY
Start: 2025-03-11

## 2025-04-08 RX ORDER — AMOXICILLIN AND CLAVULANATE POTASSIUM 875; 125 MG/1; MG/1
1 TABLET, FILM COATED ORAL EVERY 12 HOURS
Qty: 14 TABLET | Refills: 0 | Status: SHIPPED | OUTPATIENT
Start: 2025-04-08 | End: 2025-04-15

## 2025-04-08 NOTE — PROGRESS NOTES
"Subjective:       Patient ID: Rosa Lau is a 55 y.o. female.    Chief Complaint: Fatigue    Fatigue  This is a recurrent problem. The current episode started more than 1 month ago. The problem occurs daily. The problem has been waxing and waning. Associated symptoms include arthralgias, congestion, fatigue, headaches, joint swelling, neck pain and weakness. Pertinent negatives include no chest pain or vomiting. Nothing aggravates the symptoms. She has tried rest, sleep and relaxation for the symptoms. The treatment provided mild relief.     Problem List[1]    Current Medications[2]    The following portions of the patient's history were reviewed and updated as appropriate: allergies, past family history, past medical history, past social history and past surgical history.    Review of Systems   Constitutional:  Positive for activity change and fatigue. Negative for unexpected weight change.   HENT:  Positive for congestion. Negative for hearing loss, rhinorrhea and trouble swallowing.    Eyes:  Negative for discharge and visual disturbance.   Respiratory:  Positive for chest tightness and wheezing.    Cardiovascular:  Positive for palpitations. Negative for chest pain.   Gastrointestinal:  Positive for constipation. Negative for blood in stool, diarrhea and vomiting.   Endocrine: Negative for polydipsia and polyuria.   Genitourinary:  Negative for difficulty urinating, dysuria, hematuria and menstrual problem.   Musculoskeletal:  Positive for arthralgias, joint swelling and neck pain.   Neurological:  Positive for weakness and headaches.   Psychiatric/Behavioral:  Positive for dysphoric mood. Negative for confusion.        Objective:      /83   Temp 98 °F (36.7 °C)   Ht 4' 10" (1.473 m)   LMP 10/31/2020 (Exact Date)   SpO2 97%   BMI 39.90 kg/m²     Physical Exam  Constitutional:       General: She is not in acute distress.     Appearance: Normal appearance.   HENT:      Nose:      Right Sinus: " "Maxillary sinus tenderness present.      Left Sinus: Maxillary sinus tenderness present.   Cardiovascular:      Rate and Rhythm: Normal rate and regular rhythm.      Pulses: Normal pulses.      Heart sounds: Normal heart sounds.   Pulmonary:      Effort: Pulmonary effort is normal.      Breath sounds: Normal breath sounds.   Musculoskeletal:         General: Normal range of motion.   Skin:     General: Skin is warm and dry.   Neurological:      Mental Status: She is alert and oriented to person, place, and time.   Psychiatric:         Mood and Affect: Mood normal.         Behavior: Behavior normal.         Assessment:       1. Bacterial sinusitis    2. Fatigue, unspecified type        Plan:   Rosa Britt" was seen today for fatigue.    Diagnoses and all orders for this visit:    Bacterial sinusitis  -     amoxicillin-clavulanate 875-125mg (AUGMENTIN) 875-125 mg per tablet; Take 1 tablet by mouth every 12 (twelve) hours. for 7 days    Fatigue, unspecified type  -     CBC Auto Differential; Future      F/U if symptoms do not improve and/or worsen.          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    Retinoschisis, bilateral    Polycythemia    ELY (dyspnea on exertion)    HEATHER (obstructive sleep apnea)    Severe obesity (BMI 35.0-39.9) with comorbidity    Tension type headache    Allergic rhinitis    Increased risk of breast cancer    Memory loss    DDD (degenerative disc disease), lumbar    Chronic gout involving toe of right foot without tophus    Decreased ROM of trunk and back    Decreased strength of trunk and back    " Hepatic steatosis    Plantar fasciitis    Myopic macular degeneration   [2]   Current Outpatient Medications:     BELSOMRA 15 mg Tab, Take by mouth., Disp: , Rfl:     cariprazine (VRAYLAR) 1.5 mg Cap, once daily., Disp: , Rfl:     clonazePAM (KLONOPIN) 1 MG tablet, TAKE 1 AND 1/2 TABLETS BY MOUTH EVERY NIGHT, Disp: , Rfl:     ferrous fumarate-iron polysaccharide complex (TANDEM) 162-115.2 (106) mg Cap, Take 1 capsule by mouth daily with breakfast., Disp: 90 capsule, Rfl: 3    fluticasone propionate (FLONASE) 50 mcg/actuation nasal spray, 2 sprays (100 mcg total) by Each Nostril route once daily., Disp: 11.1 mL, Rfl: 0    hydroCHLOROthiazide (MICROZIDE) 12.5 mg capsule, Take 1 capsule (12.5 mg total) by mouth once daily., Disp: 90 capsule, Rfl: 3    L norgest/e.estradioL-e.estrad (SEASONIQUE) 0.15 mg-30 mcg (84)/10 mcg (7) 3MPk, Take 1 tablet by mouth once daily., Disp: 84 each, Rfl: 3    methylphenidate HCl (RITALIN) 20 MG tablet, Take 1 tablet by mouth twice a day and 0.25 tablet once a day, Disp: 68 tablet, Rfl: 0    methylphenidate HCl (RITALIN) 20 MG tablet, Take 1 tablet by mouth twice a day and 0.25 tablet once a day, Disp: 68 tablet, Rfl: 0    metroNIDAZOLE (METROGEL) 0.75 % gel, Apply topically 2 (two) times daily. To face PRN rosacea, Disp: 45 g, Rfl: 4    omega-3 acid ethyl esters (LOVAZA) 1 gram capsule, Take 2 capsules (2 g total) by mouth once daily., Disp: 180 capsule, Rfl: 3    omega-3 fatty acids 1,000 mg Cap, 1 po qd otc, Disp: 90 capsule, Rfl: 0    ondansetron (ZOFRAN-ODT) 4 MG TbDL, Take 1 tablet (4 mg total) by mouth every 6 (six) hours as needed., Disp: 12 tablet, Rfl: 0    propranoloL (INDERAL) 20 MG tablet, Take by mouth., Disp: , Rfl:     QUEtiapine (SEROQUEL) 100 MG Tab, Take 100 mg by mouth every evening., Disp: , Rfl:     VENTOLIN HFA 90 mcg/actuation inhaler, Inhale 1-2 puffs into the lungs every 6 (six) hours as needed for Wheezing or Shortness of Breath., Disp: 18 g, Rfl: 5     amoxicillin-clavulanate 875-125mg (AUGMENTIN) 875-125 mg per tablet, Take 1 tablet by mouth every 12 (twelve) hours. for 7 days, Disp: 14 tablet, Rfl: 0    fenofibrate 160 MG Tab, Take 1 tablet (160 mg total) by mouth once daily., Disp: 90 tablet, Rfl: 0    pantoprazole (PROTONIX) 40 MG tablet, Take 1 tablet (40 mg total) by mouth once daily., Disp: 90 tablet, Rfl: 0

## 2025-04-08 NOTE — TELEPHONE ENCOUNTER
Staff contact on behalf of message         ----- Message from Hali sent at 4/8/2025 10:54 AM CDT -----   Name of Who is Calling: What is the request in detail:  patient request call back in reference to reschedule canceled appointment  / sooner date (patient advised next available is 06/2025 ) Please contact to further discuss and advise  Can the clinic reply by [facility]: What Number to Call Back if not in [facility]:  [phone]

## 2025-04-10 ENCOUNTER — RESULTS FOLLOW-UP (OUTPATIENT)
Dept: FAMILY MEDICINE | Facility: CLINIC | Age: 55
End: 2025-04-10

## 2025-04-15 DIAGNOSIS — K21.9 GASTROESOPHAGEAL REFLUX DISEASE: ICD-10-CM

## 2025-04-15 DIAGNOSIS — I10 ESSENTIAL HYPERTENSION: ICD-10-CM

## 2025-04-15 RX ORDER — PANTOPRAZOLE SODIUM 40 MG/1
40 TABLET, DELAYED RELEASE ORAL DAILY
Qty: 90 TABLET | Refills: 0 | Status: SHIPPED | OUTPATIENT
Start: 2025-04-15

## 2025-04-15 RX ORDER — HYDROCHLOROTHIAZIDE 12.5 MG/1
12.5 CAPSULE ORAL DAILY
Qty: 90 CAPSULE | Refills: 3 | Status: SHIPPED | OUTPATIENT
Start: 2025-04-15

## 2025-04-23 ENCOUNTER — OFFICE VISIT (OUTPATIENT)
Dept: SLEEP MEDICINE | Facility: CLINIC | Age: 55
End: 2025-04-23
Payer: MEDICARE

## 2025-04-23 VITALS
WEIGHT: 189.06 LBS | BODY MASS INDEX: 39.69 KG/M2 | DIASTOLIC BLOOD PRESSURE: 71 MMHG | HEIGHT: 58 IN | SYSTOLIC BLOOD PRESSURE: 106 MMHG | HEART RATE: 97 BPM

## 2025-04-23 DIAGNOSIS — F51.01 PRIMARY INSOMNIA: ICD-10-CM

## 2025-04-23 DIAGNOSIS — I10 ESSENTIAL HYPERTENSION: ICD-10-CM

## 2025-04-23 DIAGNOSIS — G47.19 OTHER HYPERSOMNIA: ICD-10-CM

## 2025-04-23 DIAGNOSIS — Z13.31 POSITIVE DEPRESSION SCREENING: ICD-10-CM

## 2025-04-23 DIAGNOSIS — Z78.9 INTOLERANCE OF CONTINUOUS POSITIVE AIRWAY PRESSURE (CPAP) VENTILATION: ICD-10-CM

## 2025-04-23 DIAGNOSIS — G47.33 OBSTRUCTIVE SLEEP APNEA: Primary | ICD-10-CM

## 2025-04-23 PROCEDURE — 99999 PR PBB SHADOW E&M-EST. PATIENT-LVL IV: CPT | Mod: PBBFAC,HCNC,, | Performed by: NURSE PRACTITIONER

## 2025-04-23 PROCEDURE — 3044F HG A1C LEVEL LT 7.0%: CPT | Mod: HCNC,CPTII,S$GLB, | Performed by: NURSE PRACTITIONER

## 2025-04-23 PROCEDURE — 3078F DIAST BP <80 MM HG: CPT | Mod: HCNC,CPTII,S$GLB, | Performed by: NURSE PRACTITIONER

## 2025-04-23 PROCEDURE — 1160F RVW MEDS BY RX/DR IN RCRD: CPT | Mod: HCNC,CPTII,S$GLB, | Performed by: NURSE PRACTITIONER

## 2025-04-23 PROCEDURE — 3074F SYST BP LT 130 MM HG: CPT | Mod: HCNC,CPTII,S$GLB, | Performed by: NURSE PRACTITIONER

## 2025-04-23 PROCEDURE — 1159F MED LIST DOCD IN RCRD: CPT | Mod: HCNC,CPTII,S$GLB, | Performed by: NURSE PRACTITIONER

## 2025-04-23 PROCEDURE — 99214 OFFICE O/P EST MOD 30 MIN: CPT | Mod: HCNC,S$GLB,, | Performed by: NURSE PRACTITIONER

## 2025-04-23 PROCEDURE — 3008F BODY MASS INDEX DOCD: CPT | Mod: HCNC,CPTII,S$GLB, | Performed by: NURSE PRACTITIONER

## 2025-04-23 NOTE — PROGRESS NOTES
"ESTABLISHED PATIENT VISIT    Rosa Lau  is a pleasant 55 y.o. female established with the [facility], here to follow up on HEATHER.    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    Hyperlipidemia     Hypertension     Macular degeneration of both eyes      Problem List[1]  Current Medications[2]       Vitals:    04/23/25 1030   BP: 106/71   BP Location: Left arm   Pulse: 97   Weight: 85.7 kg (189 lb 0.7 oz)   Height: 4' 10" (1.473 m)     Physical Exam:    GEN:   Well-appearing  Psych:  Appropriate affect, demonstrates insight  SKIN:  No rash on the face or bridge of the nose      LABS:   Lab Results   Component Value Date    HGB 15.2 04/08/2025    CO2 25 02/05/2025         RECORDS REVIEWED:  PSG 9.28.20 - AHI 22.3, JAZ 57.3, ngozi 87%    Sig PMH: ADHD, HLD, Bipolar d/o, HTN, GERD, asthma, anxiety, HEATHER, gout, polycythemia  PROBLEM DESCRIPTION/ Sx on Presentation Interval Hx STATUS PLAN     HEATHER   Presentation:     Diagnosed in 2020.  Previously seen by , LOV 1.18.22 for pap intolerance.    PAP history   Dx Study PSG 9.28.20 - AHI 22.3, JAZ 57.3, ngozi 87%   Machine age CO, DS2, setup 10.26.20   Mask Nasal cushions   DME HME   My Air    PAP altn    Benefits    PROBS Mouth is sometimes very dry.        Since last visit:     Had stopped using CPAP for years, until recently restarted.     Struggled with cpap previously d/t nasal congestion. Is waiting to be seen by ENT, but she's been using nasal rinses, flonase, and sleeps on her side with improved tolerance of cpap.      Has been using her cpap for the past 2 weeks, and it's been going better.    Able to wear CPAP for short periods, but her hip pain wakes her up and then it's very hard to fall back to sleep. She usually " discontinues CPAP after that.  She is working on wearing it for longer durations.    Reports some nights with severe mouth dryness; does recall mouth venting occasionally. Currently humidity lvl is at 5, the maximum. Discussed switching to hybrid mask, chin strap, mouth tape, xylimelts, climate line tubing.        Interrogation 04/23/2025:  Pap 5-11 (90% pressure 7), mask fit 100%, AHI 1.5 hrs, avg usage time 26 min     controlled   PAP PLAN   E min 5 cwp (cont)   I max 11 cwp (cont)   PS/epr    RAMP 5 x 30 min   Changes        Residual predicted AHI within optimal range using limited data from therapy.    Discussed therapeutic goals for PAP therapy. Sultan is usage 100% of nights for at least 6 hours per night. Minimum usage is 70% of nights for at least 4 hours per night used.    Pt is using CPAP; retrialing. To continue CPAP with an effort to meet minimum usage in the next 1-2 months.    Plan for patient to check in within 1-2 months; will consider titration study if continued pap intolerance.    Rx supplies           Daytime Sx          Feels exhausted. Dozing unintentionally during the day.    ESS 17/24 on intake today     new   -will reassess sleepiness after HEATHER is adequately controlled     Insomnia       Struggled with insomnia since she was a teenager.    SLEEP SCHEDULE   Wind- down Has set bedtime routine   Envmnt    CBTi no   Meds prior Belsomra (vivid nightmares)  Seroquel (caused resp depression)   Meds now Klonopin 1 mg nightly   Bed Time 1015-1045PM (work)  1115-1145PM (off)   Lights out    Latency Varies, 30 min- 2hrs   Arousals 2x   Back to sleep 1-2hrs, sometimes never falls asleep   Stim. ctrl    Wake time 7-830AM   Caffeine Limits to before 230PM   Naps I was taking around two because I am tired and tend to just crash, making it virtually impossible to stay awake. This last week, I have been trying VERY hard not to nap. I am finding that difficult.   Nocturia 0-1   Work                 persists    -we discussed CHRONIC INSOMNIA  RECOMMENDATIONS:  WIND-DOWN:     Pt has created her own bedtime routine in an effort to have better hygiene.  She is limiting caffeine, avoiding naps, setting a time to get to bed.    -consider formal Cognitive Behavior Therapy for insomnia (CBTi). Referral placed.       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    Retinoschisis, bilateral    Polycythemia    ELY (dyspnea on exertion)    HEATHER (obstructive sleep apnea)    Severe obesity (BMI 35.0-39.9) with comorbidity    Tension type headache    Allergic rhinitis    Increased risk of breast cancer    Memory loss    DDD (degenerative disc disease), lumbar    Chronic gout involving toe of right foot without tophus    Decreased ROM of trunk and back    Decreased strength of trunk and back    Hepatic steatosis    Plantar fasciitis    Myopic macular degeneration   [2]   Current Outpatient Medications:     BELSOMRA 15 mg Tab, Take by mouth., Disp: , Rfl:     cariprazine (VRAYLAR) 1.5 mg Cap, once daily., Disp: , Rfl:     clonazePAM (KLONOPIN) 1 MG tablet, TAKE 1 AND 1/2 TABLETS BY MOUTH EVERY NIGHT, Disp: , Rfl:     fenofibrate 160 MG Tab, Take 1 tablet (160 mg total) by mouth once daily., Disp: 90 tablet, Rfl: 0    ferrous fumarate-iron polysaccharide complex (TANDEM) 162-115.2 (106) mg Cap, Take 1 capsule by mouth daily with breakfast., Disp: 90 capsule, Rfl: 3    fluticasone propionate (FLONASE) 50 mcg/actuation nasal spray, 2 sprays (100 mcg total) by Each Nostril  route once daily., Disp: 11.1 mL, Rfl: 0    hydroCHLOROthiazide (MICROZIDE) 12.5 mg capsule, Take 1 capsule (12.5 mg total) by mouth once daily., Disp: 90 capsule, Rfl: 3    L norgest/e.estradioL-e.estrad (SEASONIQUE) 0.15 mg-30 mcg (84)/10 mcg (7) 3MPk, Take 1 tablet by mouth once daily., Disp: 84 each, Rfl: 3    methylphenidate HCl (RITALIN) 20 MG tablet, Take 1 tablet by mouth twice a day and 0.25 tablet once a day, Disp: 68 tablet, Rfl: 0    methylphenidate HCl (RITALIN) 20 MG tablet, Take 1 tablet by mouth twice a day and 0.25 tablet once a day, Disp: 68 tablet, Rfl: 0    metroNIDAZOLE (METROGEL) 0.75 % gel, Apply topically 2 (two) times daily. To face PRN rosacea, Disp: 45 g, Rfl: 4    omega-3 acid ethyl esters (LOVAZA) 1 gram capsule, Take 2 capsules (2 g total) by mouth once daily., Disp: 180 capsule, Rfl: 3    omega-3 fatty acids 1,000 mg Cap, 1 po qd otc, Disp: 90 capsule, Rfl: 0    ondansetron (ZOFRAN-ODT) 4 MG TbDL, Take 1 tablet (4 mg total) by mouth every 6 (six) hours as needed., Disp: 12 tablet, Rfl: 0    pantoprazole (PROTONIX) 40 MG tablet, Take 1 tablet (40 mg total) by mouth once daily., Disp: 90 tablet, Rfl: 0    propranoloL (INDERAL) 20 MG tablet, Take by mouth., Disp: , Rfl:     QUEtiapine (SEROQUEL) 100 MG Tab, Take 100 mg by mouth every evening., Disp: , Rfl:     VENTOLIN HFA 90 mcg/actuation inhaler, Inhale 1-2 puffs into the lungs every 6 (six) hours as needed for Wheezing or Shortness of Breath., Disp: 18 g, Rfl: 5

## 2025-04-28 ENCOUNTER — PATIENT MESSAGE (OUTPATIENT)
Dept: OBSTETRICS AND GYNECOLOGY | Facility: CLINIC | Age: 55
End: 2025-04-28
Payer: MEDICARE

## 2025-04-28 DIAGNOSIS — Z78.0 MENOPAUSE: Primary | ICD-10-CM

## 2025-04-28 NOTE — TELEPHONE ENCOUNTER
Schedule virtual and have her check FSH, Estradiol dx- menopause, while on the OCP prior to her visit. Yes to OCP can affect her labs but I want to see what her level is while on the pill.

## 2025-04-29 ENCOUNTER — LAB VISIT (OUTPATIENT)
Dept: LAB | Facility: OTHER | Age: 55
End: 2025-04-29
Attending: FAMILY MEDICINE
Payer: MEDICARE

## 2025-04-29 DIAGNOSIS — Z78.0 MENOPAUSE: ICD-10-CM

## 2025-04-29 LAB
ESTRADIOL SERPL HS-MCNC: <10 PG/ML
FSH SERPL-ACNC: 10.86 MIU/ML

## 2025-04-29 PROCEDURE — 36415 COLL VENOUS BLD VENIPUNCTURE: CPT | Mod: HCNC

## 2025-04-29 PROCEDURE — 82670 ASSAY OF TOTAL ESTRADIOL: CPT | Mod: HCNC

## 2025-04-29 PROCEDURE — 83001 ASSAY OF GONADOTROPIN (FSH): CPT | Mod: HCNC

## 2025-04-29 NOTE — TELEPHONE ENCOUNTER
Called pt( pt made a mychart follow up appt ) I also scheduled virtual visit.       Bebe HERNANDEZ

## 2025-05-02 ENCOUNTER — RESULTS FOLLOW-UP (OUTPATIENT)
Dept: OBSTETRICS AND GYNECOLOGY | Facility: CLINIC | Age: 55
End: 2025-05-02

## 2025-05-02 DIAGNOSIS — Z78.0 MENOPAUSE: Primary | ICD-10-CM

## 2025-06-06 ENCOUNTER — LAB VISIT (OUTPATIENT)
Dept: LAB | Facility: OTHER | Age: 55
End: 2025-06-06
Attending: FAMILY MEDICINE
Payer: MEDICARE

## 2025-06-06 DIAGNOSIS — Z78.0 MENOPAUSE: ICD-10-CM

## 2025-06-06 LAB
ESTRADIOL SERPL HS-MCNC: 11 PG/ML
FSH SERPL-ACNC: 73.41 MIU/ML

## 2025-06-06 PROCEDURE — 82670 ASSAY OF TOTAL ESTRADIOL: CPT | Mod: HCNC

## 2025-06-06 PROCEDURE — 83001 ASSAY OF GONADOTROPIN (FSH): CPT | Mod: HCNC

## 2025-06-06 PROCEDURE — 36415 COLL VENOUS BLD VENIPUNCTURE: CPT | Mod: HCNC

## 2025-06-12 ENCOUNTER — OFFICE VISIT (OUTPATIENT)
Dept: OBSTETRICS AND GYNECOLOGY | Facility: CLINIC | Age: 55
End: 2025-06-12
Attending: OBSTETRICS & GYNECOLOGY
Payer: MEDICARE

## 2025-06-12 VITALS
SYSTOLIC BLOOD PRESSURE: 116 MMHG | HEIGHT: 58 IN | DIASTOLIC BLOOD PRESSURE: 74 MMHG | WEIGHT: 178.81 LBS | BODY MASS INDEX: 37.54 KG/M2

## 2025-06-12 DIAGNOSIS — Z78.0 MENOPAUSE: Primary | ICD-10-CM

## 2025-06-12 PROCEDURE — 3008F BODY MASS INDEX DOCD: CPT | Mod: CPTII,HCNC,S$GLB, | Performed by: OBSTETRICS & GYNECOLOGY

## 2025-06-12 PROCEDURE — 99213 OFFICE O/P EST LOW 20 MIN: CPT | Mod: HCNC,S$GLB,, | Performed by: OBSTETRICS & GYNECOLOGY

## 2025-06-12 PROCEDURE — 3078F DIAST BP <80 MM HG: CPT | Mod: CPTII,HCNC,S$GLB, | Performed by: OBSTETRICS & GYNECOLOGY

## 2025-06-12 PROCEDURE — 3044F HG A1C LEVEL LT 7.0%: CPT | Mod: CPTII,HCNC,S$GLB, | Performed by: OBSTETRICS & GYNECOLOGY

## 2025-06-12 PROCEDURE — 1159F MED LIST DOCD IN RCRD: CPT | Mod: CPTII,HCNC,S$GLB, | Performed by: OBSTETRICS & GYNECOLOGY

## 2025-06-12 PROCEDURE — 99999 PR PBB SHADOW E&M-EST. PATIENT-LVL III: CPT | Mod: PBBFAC,HCNC,, | Performed by: OBSTETRICS & GYNECOLOGY

## 2025-06-12 PROCEDURE — 3074F SYST BP LT 130 MM HG: CPT | Mod: CPTII,HCNC,S$GLB, | Performed by: OBSTETRICS & GYNECOLOGY

## 2025-06-12 RX ORDER — ESTRADIOL 1 MG/1
1 TABLET ORAL DAILY
Qty: 90 TABLET | Refills: 3 | Status: SHIPPED | OUTPATIENT
Start: 2025-06-12 | End: 2026-06-12

## 2025-06-12 RX ORDER — PROGESTERONE 100 MG/1
100 CAPSULE ORAL DAILY
Qty: 90 CAPSULE | Refills: 3 | Status: SHIPPED | OUTPATIENT
Start: 2025-06-12 | End: 2026-06-12

## 2025-06-12 NOTE — PROGRESS NOTES
Subjective:       Patient ID: Rosa Lau is a 55 y.o. female.    Chief Complaint:  Menopause (Discuss hormones)        History of Present Illness  Rosa Lau is a 55 y.o. female  who presents for hormone consultation. Over the last few years patient has waxed and waned in and out of menopause and we kept alternating OCP and HRT. She was doing well on OCP recently and then all of her menopausal symptoms started to come back. Insomnia, back pain, night sweats. I had her do labs on OCP which showed estrogen was very low and then repeat after she stopped OCP for a month and those labs were more consistent with typical menopause. We discussed options. I recommend start HRT. Patient has taken before. We discussed some fatty liver but she sweats so much that she is afraid the patch will not stick. Will start with oral and if hepatologist has concerns we can try the patch. All questions answered.     Patient's last menstrual period was 10/31/2020 (exact date).   Date of Last Pap: 2022    Review of Systems  Review of Systems   Constitutional:  Negative for chills and fever.        Objective:   Physical Exam:   Constitutional: She appears well-developed and well-nourished.                                  Assessment/ Plan:     1. Menopause  progesterone (PROMETRIUM) 100 MG capsule    estradioL (ESTRACE) 1 MG tablet          No follow-ups on file.    As of 2021, the Cures Act has been passed nationally. This new law requires that all doctors progress notes, lab results, pathology reports and radiology reports be released IMMEDIATELY to the patient in the patient portal. That means that the results are released to you at the EXACT same time they are released to me. Therefore, with all of the patients that I have I am not able to reply to each patient exactly when the results come in. So there will be a delay from when you see the results to when I see them and have time to come up with a  response to send you. Also I only see these results when I am on the computer at work. So if the results come in over the weekend or after 5 pm of a work day, I will not see them until the next business day. As you can tell, this is a challenge as a physician to give every patient the quick response they hope for and deserve. So please be patient! Thanks for understanding, Dr. Dumont
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
## 2025-07-01 DIAGNOSIS — M51.369 DEGENERATION OF INTERVERTEBRAL DISC OF LUMBAR REGION, UNSPECIFIED WHETHER PAIN PRESENT: Primary | ICD-10-CM

## 2025-07-08 ENCOUNTER — OFFICE VISIT (OUTPATIENT)
Dept: ORTHOPEDICS | Facility: CLINIC | Age: 55
End: 2025-07-08
Payer: MEDICARE

## 2025-07-08 ENCOUNTER — HOSPITAL ENCOUNTER (OUTPATIENT)
Dept: RADIOLOGY | Facility: HOSPITAL | Age: 55
Discharge: HOME | End: 2025-07-08
Attending: ORTHOPAEDIC SURGERY
Payer: MEDICARE

## 2025-07-08 VITALS — WEIGHT: 178.81 LBS | BODY MASS INDEX: 37.54 KG/M2 | HEIGHT: 58 IN

## 2025-07-08 DIAGNOSIS — M51.369 DEGENERATION OF INTERVERTEBRAL DISC OF LUMBAR REGION, UNSPECIFIED WHETHER PAIN PRESENT: ICD-10-CM

## 2025-07-08 DIAGNOSIS — M51.369 DEGENERATION OF INTERVERTEBRAL DISC OF LUMBAR REGION, UNSPECIFIED WHETHER PAIN PRESENT: Primary | ICD-10-CM

## 2025-07-08 PROCEDURE — 3008F BODY MASS INDEX DOCD: CPT | Mod: CPTII,HCNC,S$GLB, | Performed by: ORTHOPAEDIC SURGERY

## 2025-07-08 PROCEDURE — 72110 X-RAY EXAM L-2 SPINE 4/>VWS: CPT | Mod: 26,,, | Performed by: STUDENT IN AN ORGANIZED HEALTH CARE EDUCATION/TRAINING PROGRAM

## 2025-07-08 PROCEDURE — 3044F HG A1C LEVEL LT 7.0%: CPT | Mod: CPTII,HCNC,S$GLB, | Performed by: ORTHOPAEDIC SURGERY

## 2025-07-08 PROCEDURE — 99214 OFFICE O/P EST MOD 30 MIN: CPT | Mod: HCNC,S$GLB,, | Performed by: ORTHOPAEDIC SURGERY

## 2025-07-08 PROCEDURE — 1159F MED LIST DOCD IN RCRD: CPT | Mod: CPTII,HCNC,S$GLB, | Performed by: ORTHOPAEDIC SURGERY

## 2025-07-08 PROCEDURE — 72110 X-RAY EXAM L-2 SPINE 4/>VWS: CPT | Mod: TC

## 2025-07-08 PROCEDURE — 99999 PR PBB SHADOW E&M-EST. PATIENT-LVL IV: CPT | Mod: PBBFAC,,, | Performed by: ORTHOPAEDIC SURGERY

## 2025-07-08 RX ORDER — METHOCARBAMOL 750 MG/1
750 TABLET, FILM COATED ORAL NIGHTLY PRN
Qty: 30 TABLET | Refills: 0 | Status: SHIPPED | OUTPATIENT
Start: 2025-07-08 | End: 2025-08-09

## 2025-07-08 RX ORDER — MELOXICAM 15 MG/1
15 TABLET ORAL DAILY
Qty: 30 TABLET | Refills: 0 | Status: SHIPPED | OUTPATIENT
Start: 2025-07-08

## 2025-08-17 DIAGNOSIS — E78.5 HYPERLIPIDEMIA, UNSPECIFIED HYPERLIPIDEMIA TYPE: ICD-10-CM

## 2025-08-20 RX ORDER — FENOFIBRATE 160 MG/1
160 TABLET ORAL DAILY
Qty: 30 TABLET | Refills: 0 | Status: SHIPPED | OUTPATIENT
Start: 2025-08-20